# Patient Record
Sex: MALE | Employment: FULL TIME | URBAN - NONMETROPOLITAN AREA
[De-identification: names, ages, dates, MRNs, and addresses within clinical notes are randomized per-mention and may not be internally consistent; named-entity substitution may affect disease eponyms.]

---

## 2017-01-19 ENCOUNTER — TELEPHONE (OUTPATIENT)
Dept: FAMILY MEDICINE CLINIC | Age: 32
End: 2017-01-19

## 2017-01-19 DIAGNOSIS — Z79.4 TYPE 2 DIABETES MELLITUS WITH DIABETIC NEUROPATHY, WITH LONG-TERM CURRENT USE OF INSULIN (HCC): Primary | ICD-10-CM

## 2017-01-19 DIAGNOSIS — R63.4 WEIGHT LOSS: ICD-10-CM

## 2017-01-19 DIAGNOSIS — E11.40 TYPE 2 DIABETES MELLITUS WITH DIABETIC NEUROPATHY, WITH LONG-TERM CURRENT USE OF INSULIN (HCC): Primary | ICD-10-CM

## 2017-01-30 ENCOUNTER — OFFICE VISIT (OUTPATIENT)
Dept: FAMILY MEDICINE CLINIC | Age: 32
End: 2017-01-30

## 2017-01-30 VITALS
BODY MASS INDEX: 22.81 KG/M2 | HEIGHT: 69 IN | DIASTOLIC BLOOD PRESSURE: 60 MMHG | WEIGHT: 154 LBS | OXYGEN SATURATION: 98 % | SYSTOLIC BLOOD PRESSURE: 92 MMHG | HEART RATE: 110 BPM

## 2017-01-30 DIAGNOSIS — R63.4 UNINTENDED WEIGHT LOSS: Primary | ICD-10-CM

## 2017-01-30 DIAGNOSIS — E10.42 DIABETIC POLYNEUROPATHY ASSOCIATED WITH TYPE 1 DIABETES MELLITUS (HCC): ICD-10-CM

## 2017-01-30 DIAGNOSIS — G43.909 MIGRAINE WITHOUT STATUS MIGRAINOSUS, NOT INTRACTABLE, UNSPECIFIED MIGRAINE TYPE: ICD-10-CM

## 2017-01-30 DIAGNOSIS — E10.8 TYPE 1 DIABETES MELLITUS WITH COMPLICATIONS (HCC): ICD-10-CM

## 2017-01-30 PROCEDURE — 99214 OFFICE O/P EST MOD 30 MIN: CPT | Performed by: FAMILY MEDICINE

## 2017-01-30 RX ORDER — PEN NEEDLE, DIABETIC 30 GX5/16"
NEEDLE, DISPOSABLE MISCELLANEOUS
Qty: 100 EACH | Refills: 5 | Status: SHIPPED | OUTPATIENT
Start: 2017-01-30 | End: 2018-09-13 | Stop reason: SDUPTHER

## 2017-01-30 RX ORDER — SUMATRIPTAN 5 MG/1
1 SPRAY NASAL DAILY PRN
Qty: 1 EACH | Refills: 5 | Status: SHIPPED | OUTPATIENT
Start: 2017-01-30 | End: 2017-12-18 | Stop reason: SDUPTHER

## 2017-01-30 RX ORDER — LISINOPRIL 2.5 MG/1
2.5 TABLET ORAL DAILY
Qty: 30 TABLET | Refills: 11 | Status: SHIPPED | OUTPATIENT
Start: 2017-01-30 | End: 2017-05-02 | Stop reason: DRUGHIGH

## 2017-01-30 RX ORDER — PREGABALIN 75 MG/1
75 CAPSULE ORAL 2 TIMES DAILY
Qty: 60 CAPSULE | Refills: 5 | Status: SHIPPED | OUTPATIENT
Start: 2017-01-30 | End: 2017-05-02 | Stop reason: ALTCHOICE

## 2017-01-30 ASSESSMENT — ENCOUNTER SYMPTOMS
NAUSEA: 1
SHORTNESS OF BREATH: 0
ABDOMINAL DISTENTION: 1
COUGH: 0
VOMITING: 1
DIARRHEA: 0
WHEEZING: 0

## 2017-02-15 ENCOUNTER — TELEPHONE (OUTPATIENT)
Dept: FAMILY MEDICINE CLINIC | Age: 32
End: 2017-02-15

## 2017-02-15 DIAGNOSIS — E11.49 OTHER DIABETIC NEUROLOGICAL COMPLICATION ASSOCIATED WITH TYPE 2 DIABETES MELLITUS (HCC): Primary | ICD-10-CM

## 2017-02-15 DIAGNOSIS — Z79.4 ENCOUNTER FOR LONG-TERM (CURRENT) USE OF INSULIN (HCC): ICD-10-CM

## 2017-02-15 DIAGNOSIS — E78.2 MIXED HYPERLIPIDEMIA: ICD-10-CM

## 2017-02-15 DIAGNOSIS — E10.42 DIABETIC POLYNEUROPATHY ASSOCIATED WITH TYPE 1 DIABETES MELLITUS (HCC): ICD-10-CM

## 2017-02-16 PROBLEM — E78.2 MIXED HYPERLIPIDEMIA: Status: ACTIVE | Noted: 2017-02-16

## 2017-02-16 RX ORDER — SIMVASTATIN 10 MG
10 TABLET ORAL NIGHTLY
Qty: 30 TABLET | Refills: 5 | Status: SHIPPED | OUTPATIENT
Start: 2017-02-16 | End: 2018-12-18 | Stop reason: DRUGHIGH

## 2017-02-22 RX ORDER — GABAPENTIN 100 MG/1
100 CAPSULE ORAL 3 TIMES DAILY
Qty: 90 CAPSULE | Refills: 0 | Status: SHIPPED | OUTPATIENT
Start: 2017-02-22 | End: 2017-05-02 | Stop reason: DRUGHIGH

## 2017-04-06 ENCOUNTER — HOSPITAL ENCOUNTER (EMERGENCY)
Age: 32
Discharge: HOME | End: 2017-04-06
Attending: EMERGENCY MEDICINE
Payer: MEDICARE

## 2017-04-06 VITALS
SYSTOLIC BLOOD PRESSURE: 113 MMHG | DIASTOLIC BLOOD PRESSURE: 85 MMHG | RESPIRATION RATE: 18 BRPM | BODY MASS INDEX: 22.9 KG/M2 | OXYGEN SATURATION: 99 % | HEIGHT: 70 IN | HEART RATE: 96 BPM | TEMPERATURE: 99.1 F | WEIGHT: 160 LBS

## 2017-04-06 DIAGNOSIS — G43.009 MIGRAINE WITHOUT AURA AND WITHOUT STATUS MIGRAINOSUS, NOT INTRACTABLE: Primary | ICD-10-CM

## 2017-04-06 DIAGNOSIS — L03.811 CELLULITIS OF HEAD EXCEPT FACE: ICD-10-CM

## 2017-04-06 PROCEDURE — 6360000002 HC RX W HCPCS: Performed by: EMERGENCY MEDICINE

## 2017-04-06 PROCEDURE — 99283 EMERGENCY DEPT VISIT LOW MDM: CPT

## 2017-04-06 PROCEDURE — 96372 THER/PROPH/DIAG INJ SC/IM: CPT

## 2017-04-06 RX ORDER — KETOROLAC TROMETHAMINE 30 MG/ML
60 INJECTION, SOLUTION INTRAMUSCULAR; INTRAVENOUS ONCE
Status: COMPLETED | OUTPATIENT
Start: 2017-04-06 | End: 2017-04-06

## 2017-04-06 RX ORDER — DIPHENHYDRAMINE HYDROCHLORIDE 50 MG/ML
50 INJECTION INTRAMUSCULAR; INTRAVENOUS ONCE
Status: COMPLETED | OUTPATIENT
Start: 2017-04-06 | End: 2017-04-06

## 2017-04-06 RX ORDER — SULFAMETHOXAZOLE AND TRIMETHOPRIM 800; 160 MG/1; MG/1
1 TABLET ORAL 2 TIMES DAILY
Qty: 20 TABLET | Refills: 0 | Status: SHIPPED | OUTPATIENT
Start: 2017-04-06 | End: 2017-04-16

## 2017-04-06 RX ADMIN — KETOROLAC TROMETHAMINE 60 MG: 30 INJECTION, SOLUTION INTRAMUSCULAR at 11:25

## 2017-04-06 RX ADMIN — DIPHENHYDRAMINE HYDROCHLORIDE 50 MG: 50 INJECTION, SOLUTION INTRAMUSCULAR; INTRAVENOUS at 11:28

## 2017-04-06 ASSESSMENT — PAIN DESCRIPTION - PAIN TYPE: TYPE: ACUTE PAIN

## 2017-04-06 ASSESSMENT — PAIN SCALES - WONG BAKER: WONGBAKER_NUMERICALRESPONSE: 6

## 2017-04-06 ASSESSMENT — PAIN DESCRIPTION - FREQUENCY: FREQUENCY: CONTINUOUS

## 2017-04-06 ASSESSMENT — PAIN SCALES - GENERAL
PAINLEVEL_OUTOF10: 5
PAINLEVEL_OUTOF10: 7
PAINLEVEL_OUTOF10: 7

## 2017-04-06 ASSESSMENT — PAIN DESCRIPTION - ONSET: ONSET: GRADUAL

## 2017-04-06 ASSESSMENT — PAIN DESCRIPTION - DESCRIPTORS: DESCRIPTORS: THROBBING;SHARP

## 2017-04-06 ASSESSMENT — PAIN DESCRIPTION - LOCATION: LOCATION: HEAD

## 2017-04-06 ASSESSMENT — PAIN DESCRIPTION - ORIENTATION: ORIENTATION: LEFT

## 2017-04-28 ENCOUNTER — TELEPHONE (OUTPATIENT)
Dept: FAMILY MEDICINE CLINIC | Age: 32
End: 2017-04-28

## 2017-04-28 LAB — HBA1C MFR BLD: 12.2 %

## 2017-05-02 ENCOUNTER — TELEPHONE (OUTPATIENT)
Dept: FAMILY MEDICINE CLINIC | Age: 32
End: 2017-05-02

## 2017-05-02 RX ORDER — LISINOPRIL 5 MG/1
5 TABLET ORAL DAILY
COMMUNITY
End: 2018-12-18 | Stop reason: DRUGHIGH

## 2017-05-02 RX ORDER — GABAPENTIN 300 MG/1
1 CAPSULE ORAL DAILY
Refills: 0 | COMMUNITY
Start: 2017-05-01 | End: 2017-05-03 | Stop reason: SDUPTHER

## 2017-05-03 ENCOUNTER — OFFICE VISIT (OUTPATIENT)
Dept: FAMILY MEDICINE CLINIC | Age: 32
End: 2017-05-03
Payer: MEDICARE

## 2017-05-03 VITALS
OXYGEN SATURATION: 98 % | SYSTOLIC BLOOD PRESSURE: 104 MMHG | HEART RATE: 97 BPM | DIASTOLIC BLOOD PRESSURE: 62 MMHG | WEIGHT: 163 LBS | BODY MASS INDEX: 24.14 KG/M2 | HEIGHT: 69 IN

## 2017-05-03 DIAGNOSIS — E10.42 TYPE 1 DIABETES MELLITUS WITH DIABETIC POLYNEUROPATHY (HCC): Primary | ICD-10-CM

## 2017-05-03 DIAGNOSIS — Z13.31 SCREENING FOR DEPRESSION: ICD-10-CM

## 2017-05-03 DIAGNOSIS — N50.819 TESTICULAR PAIN, UNSPECIFIED: ICD-10-CM

## 2017-05-03 PROCEDURE — G0444 DEPRESSION SCREEN ANNUAL: HCPCS | Performed by: FAMILY MEDICINE

## 2017-05-03 PROCEDURE — 99495 TRANSJ CARE MGMT MOD F2F 14D: CPT | Performed by: FAMILY MEDICINE

## 2017-05-03 RX ORDER — GABAPENTIN 300 MG/1
300 CAPSULE ORAL 3 TIMES DAILY
Qty: 90 CAPSULE | Refills: 0 | Status: SHIPPED | OUTPATIENT
Start: 2017-05-03 | End: 2017-06-13 | Stop reason: SDUPTHER

## 2017-05-03 RX ORDER — GLUCOSAMINE HCL/CHONDROITIN SU 500-400 MG
CAPSULE ORAL
Qty: 100 STRIP | Refills: 11 | Status: SHIPPED | OUTPATIENT
Start: 2017-05-03 | End: 2017-12-18 | Stop reason: SDUPTHER

## 2017-05-03 ASSESSMENT — PATIENT HEALTH QUESTIONNAIRE - PHQ9
1. LITTLE INTEREST OR PLEASURE IN DOING THINGS: 0
2. FEELING DOWN, DEPRESSED OR HOPELESS: 0
SUM OF ALL RESPONSES TO PHQ QUESTIONS 1-9: 0
SUM OF ALL RESPONSES TO PHQ9 QUESTIONS 1 & 2: 0

## 2017-05-03 ASSESSMENT — ENCOUNTER SYMPTOMS
NAUSEA: 0
VOMITING: 0

## 2017-05-04 RX ORDER — GLUCOSAMINE HCL/CHONDROITIN SU 500-400 MG
CAPSULE ORAL
Qty: 100 STRIP | Refills: 11 | Status: SHIPPED | OUTPATIENT
Start: 2017-05-04 | End: 2018-09-13 | Stop reason: SDUPTHER

## 2017-05-04 RX ORDER — LANCETS 30 GAUGE
EACH MISCELLANEOUS
Qty: 100 EACH | Refills: 11 | Status: SHIPPED | OUTPATIENT
Start: 2017-05-04 | End: 2020-04-02 | Stop reason: SDUPTHER

## 2017-05-11 ENCOUNTER — TELEPHONE (OUTPATIENT)
Dept: FAMILY MEDICINE CLINIC | Age: 32
End: 2017-05-11

## 2017-05-11 RX ORDER — GABAPENTIN 100 MG/1
100 CAPSULE ORAL 3 TIMES DAILY
Qty: 65 CAPSULE | Refills: 0 | Status: SHIPPED | OUTPATIENT
Start: 2017-05-11 | End: 2017-06-13 | Stop reason: SDUPTHER

## 2017-05-18 ENCOUNTER — TELEPHONE (OUTPATIENT)
Dept: FAMILY MEDICINE CLINIC | Age: 32
End: 2017-05-18

## 2017-06-13 DIAGNOSIS — E10.42 TYPE 1 DIABETES MELLITUS WITH DIABETIC POLYNEUROPATHY (HCC): ICD-10-CM

## 2017-06-13 RX ORDER — GABAPENTIN 100 MG/1
100 CAPSULE ORAL 3 TIMES DAILY
Qty: 90 CAPSULE | Refills: 2 | Status: SHIPPED | OUTPATIENT
Start: 2017-06-13 | End: 2017-09-25 | Stop reason: SDUPTHER

## 2017-06-13 RX ORDER — GABAPENTIN 300 MG/1
300 CAPSULE ORAL 3 TIMES DAILY
Qty: 90 CAPSULE | Refills: 2 | Status: SHIPPED | OUTPATIENT
Start: 2017-06-13 | End: 2017-09-25 | Stop reason: SDUPTHER

## 2017-07-12 ENCOUNTER — OFFICE VISIT (OUTPATIENT)
Dept: FAMILY MEDICINE CLINIC | Age: 32
End: 2017-07-12
Payer: MEDICARE

## 2017-07-12 VITALS
HEIGHT: 69 IN | BODY MASS INDEX: 24.27 KG/M2 | HEART RATE: 96 BPM | SYSTOLIC BLOOD PRESSURE: 126 MMHG | RESPIRATION RATE: 14 BRPM | DIASTOLIC BLOOD PRESSURE: 84 MMHG | WEIGHT: 163.9 LBS

## 2017-07-12 DIAGNOSIS — E10.40 TYPE 1 DIABETES MELLITUS WITH DIABETIC NEUROPATHY (HCC): Primary | ICD-10-CM

## 2017-07-12 DIAGNOSIS — E78.2 MIXED HYPERLIPIDEMIA: ICD-10-CM

## 2017-07-12 DIAGNOSIS — N50.819 TESTICULAR PAIN: ICD-10-CM

## 2017-07-12 DIAGNOSIS — Z91.030 ALLERGY TO BEE STING: ICD-10-CM

## 2017-07-12 DIAGNOSIS — R06.02 SHORTNESS OF BREATH: ICD-10-CM

## 2017-07-12 PROCEDURE — 4004F PT TOBACCO SCREEN RCVD TLK: CPT | Performed by: FAMILY MEDICINE

## 2017-07-12 PROCEDURE — 3046F HEMOGLOBIN A1C LEVEL >9.0%: CPT | Performed by: FAMILY MEDICINE

## 2017-07-12 PROCEDURE — G8420 CALC BMI NORM PARAMETERS: HCPCS | Performed by: FAMILY MEDICINE

## 2017-07-12 PROCEDURE — G8427 DOCREV CUR MEDS BY ELIG CLIN: HCPCS | Performed by: FAMILY MEDICINE

## 2017-07-12 PROCEDURE — 99214 OFFICE O/P EST MOD 30 MIN: CPT | Performed by: FAMILY MEDICINE

## 2017-07-12 RX ORDER — ALBUTEROL SULFATE 90 UG/1
1-2 AEROSOL, METERED RESPIRATORY (INHALATION) EVERY 6 HOURS PRN
Qty: 1 INHALER | Refills: 0 | Status: SHIPPED | OUTPATIENT
Start: 2017-07-12 | End: 2017-07-26 | Stop reason: ALTCHOICE

## 2017-07-12 RX ORDER — DULOXETIN HYDROCHLORIDE 30 MG/1
30 CAPSULE, DELAYED RELEASE ORAL DAILY
Qty: 30 CAPSULE | Refills: 0 | Status: SHIPPED | OUTPATIENT
Start: 2017-07-12 | End: 2017-12-18 | Stop reason: SDUPTHER

## 2017-07-12 RX ORDER — EPINEPHRINE 0.3 MG/.3ML
INJECTION SUBCUTANEOUS
Qty: 1 EACH | Refills: 0 | Status: SHIPPED | OUTPATIENT
Start: 2017-07-12 | End: 2020-04-02 | Stop reason: SDUPTHER

## 2017-07-12 ASSESSMENT — ENCOUNTER SYMPTOMS
SHORTNESS OF BREATH: 1
CHEST TIGHTNESS: 1

## 2017-07-18 ENCOUNTER — HOSPITAL ENCOUNTER (INPATIENT)
Age: 32
LOS: 2 days | Discharge: HOME | DRG: 639 | End: 2017-07-20
Attending: FAMILY MEDICINE | Admitting: FAMILY MEDICINE
Payer: MEDICARE

## 2017-07-18 DIAGNOSIS — E10.42 TYPE 1 DIABETES MELLITUS WITH DIABETIC POLYNEUROPATHY (HCC): ICD-10-CM

## 2017-07-18 DIAGNOSIS — E10.40 TYPE 1 DIABETES MELLITUS WITH DIABETIC NEUROPATHY (HCC): ICD-10-CM

## 2017-07-18 PROBLEM — E11.10 DKA (DIABETIC KETOACIDOSES): Status: ACTIVE | Noted: 2017-07-18

## 2017-07-18 LAB — GLUCOSE BLD-MCNC: 169 MG/DL (ref 75–110)

## 2017-07-18 PROCEDURE — 81001 URINALYSIS AUTO W/SCOPE: CPT

## 2017-07-18 PROCEDURE — 2060000000 HC ICU INTERMEDIATE R&B

## 2017-07-18 PROCEDURE — 2580000003 HC RX 258: Performed by: NURSE PRACTITIONER

## 2017-07-18 PROCEDURE — 6370000000 HC RX 637 (ALT 250 FOR IP): Performed by: NURSE PRACTITIONER

## 2017-07-18 PROCEDURE — 82947 ASSAY GLUCOSE BLOOD QUANT: CPT

## 2017-07-18 RX ORDER — SIMVASTATIN 10 MG
10 TABLET ORAL NIGHTLY
Status: DISCONTINUED | OUTPATIENT
Start: 2017-07-18 | End: 2017-07-20 | Stop reason: HOSPADM

## 2017-07-18 RX ORDER — SODIUM CHLORIDE 0.9 % (FLUSH) 0.9 %
10 SYRINGE (ML) INJECTION PRN
Status: DISCONTINUED | OUTPATIENT
Start: 2017-07-18 | End: 2017-07-20 | Stop reason: HOSPADM

## 2017-07-18 RX ORDER — SODIUM CHLORIDE 9 MG/ML
INJECTION, SOLUTION INTRAVENOUS CONTINUOUS
Status: DISCONTINUED | OUTPATIENT
Start: 2017-07-18 | End: 2017-07-20 | Stop reason: HOSPADM

## 2017-07-18 RX ORDER — DULOXETIN HYDROCHLORIDE 30 MG/1
30 CAPSULE, DELAYED RELEASE ORAL DAILY
Status: DISCONTINUED | OUTPATIENT
Start: 2017-07-19 | End: 2017-07-20 | Stop reason: HOSPADM

## 2017-07-18 RX ORDER — POTASSIUM CHLORIDE 7.45 MG/ML
10 INJECTION INTRAVENOUS PRN
Status: DISCONTINUED | OUTPATIENT
Start: 2017-07-18 | End: 2017-07-20

## 2017-07-18 RX ORDER — HYDROCODONE BITARTRATE AND ACETAMINOPHEN 5; 325 MG/1; MG/1
2 TABLET ORAL EVERY 4 HOURS PRN
Status: DISCONTINUED | OUTPATIENT
Start: 2017-07-18 | End: 2017-07-20 | Stop reason: HOSPADM

## 2017-07-18 RX ORDER — GABAPENTIN 100 MG/1
100 CAPSULE ORAL 3 TIMES DAILY
Status: DISCONTINUED | OUTPATIENT
Start: 2017-07-19 | End: 2017-07-18

## 2017-07-18 RX ORDER — ALBUTEROL SULFATE 90 UG/1
2 AEROSOL, METERED RESPIRATORY (INHALATION) EVERY 4 HOURS PRN
Status: DISCONTINUED | OUTPATIENT
Start: 2017-07-18 | End: 2017-07-20 | Stop reason: HOSPADM

## 2017-07-18 RX ORDER — GABAPENTIN 100 MG/1
100 CAPSULE ORAL 3 TIMES DAILY
Status: DISCONTINUED | OUTPATIENT
Start: 2017-07-18 | End: 2017-07-18

## 2017-07-18 RX ORDER — DEXTROSE AND SODIUM CHLORIDE 5; .45 G/100ML; G/100ML
INJECTION, SOLUTION INTRAVENOUS CONTINUOUS PRN
Status: DISCONTINUED | OUTPATIENT
Start: 2017-07-18 | End: 2017-07-20 | Stop reason: HOSPADM

## 2017-07-18 RX ORDER — SODIUM CHLORIDE 0.9 % (FLUSH) 0.9 %
10 SYRINGE (ML) INJECTION EVERY 12 HOURS SCHEDULED
Status: DISCONTINUED | OUTPATIENT
Start: 2017-07-18 | End: 2017-07-20 | Stop reason: HOSPADM

## 2017-07-18 RX ORDER — MAGNESIUM SULFATE 1 G/100ML
1 INJECTION INTRAVENOUS PRN
Status: DISCONTINUED | OUTPATIENT
Start: 2017-07-18 | End: 2017-07-20 | Stop reason: HOSPADM

## 2017-07-18 RX ORDER — ONDANSETRON 2 MG/ML
4 INJECTION INTRAMUSCULAR; INTRAVENOUS EVERY 6 HOURS PRN
Status: DISCONTINUED | OUTPATIENT
Start: 2017-07-18 | End: 2017-07-20 | Stop reason: HOSPADM

## 2017-07-18 RX ORDER — ACETAMINOPHEN 325 MG/1
650 TABLET ORAL EVERY 4 HOURS PRN
Status: DISCONTINUED | OUTPATIENT
Start: 2017-07-18 | End: 2017-07-20 | Stop reason: HOSPADM

## 2017-07-18 RX ORDER — GABAPENTIN 300 MG/1
300 CAPSULE ORAL 3 TIMES DAILY
Status: DISCONTINUED | OUTPATIENT
Start: 2017-07-18 | End: 2017-07-18

## 2017-07-18 RX ORDER — DEXTROSE MONOHYDRATE 25 G/50ML
12.5 INJECTION, SOLUTION INTRAVENOUS PRN
Status: DISCONTINUED | OUTPATIENT
Start: 2017-07-18 | End: 2017-07-20 | Stop reason: HOSPADM

## 2017-07-18 RX ORDER — LISINOPRIL 5 MG/1
5 TABLET ORAL DAILY
Status: DISCONTINUED | OUTPATIENT
Start: 2017-07-19 | End: 2017-07-20 | Stop reason: HOSPADM

## 2017-07-18 RX ORDER — HYDROCODONE BITARTRATE AND ACETAMINOPHEN 5; 325 MG/1; MG/1
1 TABLET ORAL EVERY 4 HOURS PRN
Status: DISCONTINUED | OUTPATIENT
Start: 2017-07-18 | End: 2017-07-20 | Stop reason: HOSPADM

## 2017-07-18 RX ADMIN — GABAPENTIN 400 MG: 300 CAPSULE ORAL at 23:50

## 2017-07-18 RX ADMIN — Medication 3.27 UNITS/HR: at 23:29

## 2017-07-18 RX ADMIN — HYDROCODONE BITARTRATE AND ACETAMINOPHEN 2 TABLET: 5; 325 TABLET ORAL at 23:50

## 2017-07-18 RX ADMIN — DEXTROSE AND SODIUM CHLORIDE: 5; 450 INJECTION, SOLUTION INTRAVENOUS at 23:28

## 2017-07-18 ASSESSMENT — PAIN DESCRIPTION - PROGRESSION: CLINICAL_PROGRESSION: GRADUALLY WORSENING

## 2017-07-18 ASSESSMENT — PAIN DESCRIPTION - ONSET: ONSET: ON-GOING

## 2017-07-18 ASSESSMENT — PAIN DESCRIPTION - DESCRIPTORS: DESCRIPTORS: THROBBING

## 2017-07-18 ASSESSMENT — PAIN DESCRIPTION - ORIENTATION: ORIENTATION: OTHER (COMMENT)

## 2017-07-18 ASSESSMENT — PAIN DESCRIPTION - LOCATION: LOCATION: BACK

## 2017-07-18 ASSESSMENT — PAIN SCALES - GENERAL
PAINLEVEL_OUTOF10: 8
PAINLEVEL_OUTOF10: 6

## 2017-07-18 ASSESSMENT — PAIN DESCRIPTION - FREQUENCY: FREQUENCY: CONTINUOUS

## 2017-07-18 ASSESSMENT — PAIN DESCRIPTION - PAIN TYPE: TYPE: ACUTE PAIN

## 2017-07-19 ENCOUNTER — APPOINTMENT (OUTPATIENT)
Dept: GENERAL RADIOLOGY | Age: 32
DRG: 639 | End: 2017-07-19
Attending: FAMILY MEDICINE
Payer: MEDICARE

## 2017-07-19 PROBLEM — E08.10 DIABETIC KETOACIDOSIS WITHOUT COMA ASSOCIATED WITH DIABETES MELLITUS DUE TO UNDERLYING CONDITION (HCC): Status: ACTIVE | Noted: 2017-07-18

## 2017-07-19 LAB
-: NORMAL
ABSOLUTE EOS #: 0 K/UL (ref 0–0.4)
ABSOLUTE LYMPH #: 1.4 K/UL (ref 1–4.8)
ABSOLUTE MONO #: 0.3 K/UL (ref 0.1–1.2)
AMORPHOUS: NORMAL
ANION GAP SERPL CALCULATED.3IONS-SCNC: 11 MMOL/L (ref 9–17)
ANION GAP SERPL CALCULATED.3IONS-SCNC: 12 MMOL/L (ref 9–17)
ANION GAP SERPL CALCULATED.3IONS-SCNC: 13 MMOL/L (ref 9–17)
ANION GAP SERPL CALCULATED.3IONS-SCNC: 17 MMOL/L (ref 9–17)
BACTERIA: NORMAL
BASOPHILS # BLD: 1 %
BASOPHILS ABSOLUTE: 0 K/UL (ref 0–0.2)
BILIRUBIN URINE: ABNORMAL
BUN BLDV-MCNC: 10 MG/DL (ref 6–20)
BUN BLDV-MCNC: 12 MG/DL (ref 6–20)
BUN BLDV-MCNC: 14 MG/DL (ref 6–20)
BUN BLDV-MCNC: 15 MG/DL (ref 6–20)
BUN BLDV-MCNC: 9 MG/DL (ref 6–20)
BUN BLDV-MCNC: 9 MG/DL (ref 6–20)
BUN/CREAT BLD: 16 (ref 9–20)
BUN/CREAT BLD: 17 (ref 9–20)
BUN/CREAT BLD: 19 (ref 9–20)
BUN/CREAT BLD: 21 (ref 9–20)
BUN/CREAT BLD: 24 (ref 9–20)
BUN/CREAT BLD: 28 (ref 9–20)
CALCIUM SERPL-MCNC: 8.7 MG/DL (ref 8.6–10.4)
CALCIUM SERPL-MCNC: 8.8 MG/DL (ref 8.6–10.4)
CALCIUM SERPL-MCNC: 9.1 MG/DL (ref 8.6–10.4)
CALCIUM SERPL-MCNC: 9.2 MG/DL (ref 8.6–10.4)
CASTS UA: NORMAL /LPF (ref 0–2)
CHLORIDE BLD-SCNC: 100 MMOL/L (ref 98–107)
CHLORIDE BLD-SCNC: 98 MMOL/L (ref 98–107)
CHLORIDE BLD-SCNC: 99 MMOL/L (ref 98–107)
CO2: 21 MMOL/L (ref 20–31)
CO2: 23 MMOL/L (ref 20–31)
CO2: 24 MMOL/L (ref 20–31)
CO2: 24 MMOL/L (ref 20–31)
CO2: 25 MMOL/L (ref 20–31)
CO2: 26 MMOL/L (ref 20–31)
COLOR: ABNORMAL
COMMENT UA: ABNORMAL
CREAT SERPL-MCNC: 0.5 MG/DL (ref 0.7–1.2)
CREAT SERPL-MCNC: 0.53 MG/DL (ref 0.7–1.2)
CREAT SERPL-MCNC: 0.54 MG/DL (ref 0.7–1.2)
CREAT SERPL-MCNC: 0.56 MG/DL (ref 0.7–1.2)
CREAT SERPL-MCNC: 0.58 MG/DL (ref 0.7–1.2)
CREAT SERPL-MCNC: 0.63 MG/DL (ref 0.7–1.2)
CRYSTALS, UA: NORMAL /HPF
DIFFERENTIAL TYPE: ABNORMAL
EOSINOPHILS RELATIVE PERCENT: 0 %
EPITHELIAL CELLS UA: NORMAL /HPF (ref 0–5)
FIO2: ABNORMAL
GFR AFRICAN AMERICAN: >60 ML/MIN
GFR NON-AFRICAN AMERICAN: >60 ML/MIN
GFR SERPL CREATININE-BSD FRML MDRD: ABNORMAL ML/MIN/{1.73_M2}
GLUCOSE BLD-MCNC: 126 MG/DL (ref 75–110)
GLUCOSE BLD-MCNC: 132 MG/DL (ref 75–110)
GLUCOSE BLD-MCNC: 141 MG/DL (ref 75–110)
GLUCOSE BLD-MCNC: 141 MG/DL (ref 75–110)
GLUCOSE BLD-MCNC: 151 MG/DL (ref 70–99)
GLUCOSE BLD-MCNC: 152 MG/DL (ref 75–110)
GLUCOSE BLD-MCNC: 155 MG/DL (ref 75–110)
GLUCOSE BLD-MCNC: 159 MG/DL (ref 70–99)
GLUCOSE BLD-MCNC: 161 MG/DL (ref 75–110)
GLUCOSE BLD-MCNC: 164 MG/DL (ref 75–110)
GLUCOSE BLD-MCNC: 165 MG/DL (ref 75–110)
GLUCOSE BLD-MCNC: 172 MG/DL (ref 70–99)
GLUCOSE BLD-MCNC: 174 MG/DL (ref 70–99)
GLUCOSE BLD-MCNC: 174 MG/DL (ref 75–110)
GLUCOSE BLD-MCNC: 174 MG/DL (ref 75–110)
GLUCOSE BLD-MCNC: 177 MG/DL (ref 75–110)
GLUCOSE BLD-MCNC: 178 MG/DL (ref 70–99)
GLUCOSE BLD-MCNC: 180 MG/DL (ref 70–99)
GLUCOSE BLD-MCNC: 185 MG/DL (ref 75–110)
GLUCOSE BLD-MCNC: 189 MG/DL (ref 75–110)
GLUCOSE BLD-MCNC: 191 MG/DL (ref 75–110)
GLUCOSE BLD-MCNC: 200 MG/DL (ref 75–110)
GLUCOSE BLD-MCNC: 222 MG/DL (ref 75–110)
GLUCOSE BLD-MCNC: 250 MG/DL (ref 75–110)
GLUCOSE BLD-MCNC: 256 MG/DL (ref 75–110)
GLUCOSE BLD-MCNC: 268 MG/DL (ref 75–110)
GLUCOSE BLD-MCNC: 287 MG/DL (ref 75–110)
GLUCOSE BLD-MCNC: 90 MG/DL (ref 75–110)
GLUCOSE URINE: ABNORMAL
HCT VFR BLD CALC: 41.8 % (ref 41–53)
HEMOGLOBIN: 14.7 G/DL (ref 13.5–17.5)
KETONES, URINE: ABNORMAL
LEUKOCYTE ESTERASE, URINE: NEGATIVE
LYMPHOCYTES # BLD: 46 %
MAGNESIUM: 1.6 MG/DL (ref 1.6–2.6)
MAGNESIUM: 1.7 MG/DL (ref 1.6–2.6)
MAGNESIUM: 1.7 MG/DL (ref 1.6–2.6)
MAGNESIUM: 1.8 MG/DL (ref 1.6–2.6)
MAGNESIUM: 1.9 MG/DL (ref 1.6–2.6)
MAGNESIUM: 2.1 MG/DL (ref 1.6–2.6)
MCH RBC QN AUTO: 33.5 PG (ref 26–34)
MCHC RBC AUTO-ENTMCNC: 35.1 G/DL (ref 31–37)
MCV RBC AUTO: 95.4 FL (ref 80–100)
MONOCYTES # BLD: 11 %
MUCUS: NORMAL
NEGATIVE BASE EXCESS, ART: 9 (ref 0–2)
NITRITE, URINE: NEGATIVE
O2 DEVICE/FLOW/%: ABNORMAL
OTHER OBSERVATIONS UA: NORMAL
PATIENT TEMP: 37
PDW BLD-RTO: 12 % (ref 11–14.5)
PH UA: 5.5 (ref 5–6)
PHOSPHORUS: 3.2 MG/DL (ref 2.5–4.5)
PHOSPHORUS: 4.2 MG/DL (ref 2.5–4.5)
PLATELET # BLD: 133 K/UL (ref 140–450)
PLATELET ESTIMATE: ABNORMAL
PMV BLD AUTO: 8.1 FL (ref 6–12)
POC HCO3: 17.7 MMOL/L (ref 22–27)
POC O2 SATURATION: 97 %
POC PCO2 TEMP: ABNORMAL MM HG
POC PCO2: 38 MM HG (ref 32–45)
POC PH TEMP: ABNORMAL
POC PH: 7.28 (ref 7.35–7.45)
POC PO2 TEMP: ABNORMAL MM HG
POC PO2: 102 MM HG (ref 75–95)
POSITIVE BASE EXCESS, ART: ABNORMAL (ref 0–2)
POTASSIUM SERPL-SCNC: 3.4 MMOL/L (ref 3.7–5.3)
POTASSIUM SERPL-SCNC: 3.6 MMOL/L (ref 3.7–5.3)
POTASSIUM SERPL-SCNC: 3.6 MMOL/L (ref 3.7–5.3)
POTASSIUM SERPL-SCNC: 3.8 MMOL/L (ref 3.7–5.3)
POTASSIUM SERPL-SCNC: 3.8 MMOL/L (ref 3.7–5.3)
POTASSIUM SERPL-SCNC: 4.1 MMOL/L (ref 3.7–5.3)
PROTEIN UA: NEGATIVE
RBC # BLD: 4.38 M/UL (ref 4.5–5.9)
RBC # BLD: ABNORMAL 10*6/UL
RBC UA: NORMAL /HPF (ref 0–4)
RENAL EPITHELIAL, UA: NORMAL /HPF
SEG NEUTROPHILS: 42 %
SEGMENTED NEUTROPHILS ABSOLUTE COUNT: 1.3 K/UL (ref 1.8–7.7)
SODIUM BLD-SCNC: 136 MMOL/L (ref 135–144)
SODIUM BLD-SCNC: 137 MMOL/L (ref 135–144)
SPECIFIC GRAVITY UA: 1.02 (ref 1.01–1.02)
TCO2 (CALC), ART: 19 MMOL/L (ref 23–28)
TRICHOMONAS: NORMAL
TURBIDITY: ABNORMAL
URINE HGB: NEGATIVE
UROBILINOGEN, URINE: NORMAL
WBC # BLD: 3 K/UL (ref 3.5–11)
WBC # BLD: ABNORMAL 10*3/UL
WBC UA: NORMAL /HPF (ref 0–4)
YEAST: NORMAL

## 2017-07-19 PROCEDURE — 6370000000 HC RX 637 (ALT 250 FOR IP)

## 2017-07-19 PROCEDURE — 36415 COLL VENOUS BLD VENIPUNCTURE: CPT

## 2017-07-19 PROCEDURE — 6370000000 HC RX 637 (ALT 250 FOR IP): Performed by: INTERNAL MEDICINE

## 2017-07-19 PROCEDURE — 6360000002 HC RX W HCPCS: Performed by: NURSE PRACTITIONER

## 2017-07-19 PROCEDURE — 85025 COMPLETE CBC W/AUTO DIFF WBC: CPT

## 2017-07-19 PROCEDURE — 84100 ASSAY OF PHOSPHORUS: CPT

## 2017-07-19 PROCEDURE — 2580000003 HC RX 258: Performed by: NURSE PRACTITIONER

## 2017-07-19 PROCEDURE — 82947 ASSAY GLUCOSE BLOOD QUANT: CPT

## 2017-07-19 PROCEDURE — 80048 BASIC METABOLIC PNL TOTAL CA: CPT

## 2017-07-19 PROCEDURE — 83735 ASSAY OF MAGNESIUM: CPT

## 2017-07-19 PROCEDURE — 2060000000 HC ICU INTERMEDIATE R&B

## 2017-07-19 PROCEDURE — 6370000000 HC RX 637 (ALT 250 FOR IP): Performed by: NURSE PRACTITIONER

## 2017-07-19 PROCEDURE — 99232 SBSQ HOSP IP/OBS MODERATE 35: CPT | Performed by: INTERNAL MEDICINE

## 2017-07-19 PROCEDURE — 82803 BLOOD GASES ANY COMBINATION: CPT

## 2017-07-19 RX ORDER — POTASSIUM CHLORIDE 20 MEQ/1
30 TABLET, EXTENDED RELEASE ORAL ONCE
Status: COMPLETED | OUTPATIENT
Start: 2017-07-19 | End: 2017-07-19

## 2017-07-19 RX ORDER — DEXTROSE MONOHYDRATE 25 G/50ML
12.5 INJECTION, SOLUTION INTRAVENOUS PRN
Status: DISCONTINUED | OUTPATIENT
Start: 2017-07-19 | End: 2017-07-20 | Stop reason: HOSPADM

## 2017-07-19 RX ORDER — POTASSIUM CHLORIDE 750 MG/1
TABLET, EXTENDED RELEASE ORAL
Status: DISCONTINUED
Start: 2017-07-19 | End: 2017-07-19 | Stop reason: WASHOUT

## 2017-07-19 RX ORDER — SIMVASTATIN 10 MG
TABLET ORAL
Status: COMPLETED
Start: 2017-07-19 | End: 2017-07-19

## 2017-07-19 RX ORDER — DEXTROSE MONOHYDRATE 50 MG/ML
100 INJECTION, SOLUTION INTRAVENOUS PRN
Status: DISCONTINUED | OUTPATIENT
Start: 2017-07-19 | End: 2017-07-20 | Stop reason: HOSPADM

## 2017-07-19 RX ORDER — INSULIN GLARGINE 100 [IU]/ML
30 INJECTION, SOLUTION SUBCUTANEOUS DAILY
Status: DISCONTINUED | OUTPATIENT
Start: 2017-07-19 | End: 2017-07-20 | Stop reason: HOSPADM

## 2017-07-19 RX ORDER — NICOTINE POLACRILEX 4 MG
15 LOZENGE BUCCAL PRN
Status: DISCONTINUED | OUTPATIENT
Start: 2017-07-19 | End: 2017-07-20 | Stop reason: HOSPADM

## 2017-07-19 RX ADMIN — INSULIN LISPRO 25 UNITS: 100 INJECTION, SOLUTION INTRAVENOUS; SUBCUTANEOUS at 12:25

## 2017-07-19 RX ADMIN — DEXTROSE AND SODIUM CHLORIDE: 5; 450 INJECTION, SOLUTION INTRAVENOUS at 19:17

## 2017-07-19 RX ADMIN — GABAPENTIN 400 MG: 300 CAPSULE ORAL at 09:39

## 2017-07-19 RX ADMIN — INSULIN GLARGINE 30 UNITS: 100 INJECTION, SOLUTION SUBCUTANEOUS at 12:25

## 2017-07-19 RX ADMIN — SIMVASTATIN 10 MG: 10 TABLET, FILM COATED ORAL at 00:08

## 2017-07-19 RX ADMIN — DEXTROSE AND SODIUM CHLORIDE: 5; 450 INJECTION, SOLUTION INTRAVENOUS at 05:12

## 2017-07-19 RX ADMIN — GABAPENTIN 400 MG: 300 CAPSULE ORAL at 16:13

## 2017-07-19 RX ADMIN — MAGNESIUM SULFATE HEPTAHYDRATE 1 G: 1 INJECTION, SOLUTION INTRAVENOUS at 05:09

## 2017-07-19 RX ADMIN — HYDROMORPHONE HYDROCHLORIDE 0.5 MG: 1 INJECTION, SOLUTION INTRAMUSCULAR; INTRAVENOUS; SUBCUTANEOUS at 19:32

## 2017-07-19 RX ADMIN — HYDROCODONE BITARTRATE AND ACETAMINOPHEN 2 TABLET: 5; 325 TABLET ORAL at 11:09

## 2017-07-19 RX ADMIN — ENOXAPARIN SODIUM 40 MG: 40 INJECTION SUBCUTANEOUS at 09:38

## 2017-07-19 RX ADMIN — POTASSIUM CHLORIDE 30 MEQ: 20 TABLET, EXTENDED RELEASE ORAL at 05:08

## 2017-07-19 RX ADMIN — HYDROCODONE BITARTRATE AND ACETAMINOPHEN 2 TABLET: 5; 325 TABLET ORAL at 17:43

## 2017-07-19 RX ADMIN — LISINOPRIL 5 MG: 5 TABLET ORAL at 09:39

## 2017-07-19 RX ADMIN — DEXTROSE AND SODIUM CHLORIDE: 5; 450 INJECTION, SOLUTION INTRAVENOUS at 12:28

## 2017-07-19 RX ADMIN — POTASSIUM CHLORIDE 30 MEQ: 20 TABLET, EXTENDED RELEASE ORAL at 02:09

## 2017-07-19 RX ADMIN — DULOXETINE 30 MG: 30 CAPSULE, DELAYED RELEASE ORAL at 09:39

## 2017-07-19 RX ADMIN — INSULIN LISPRO 25 UNITS: 100 INJECTION, SOLUTION INTRAVENOUS; SUBCUTANEOUS at 17:24

## 2017-07-19 RX ADMIN — SIMVASTATIN 10 MG: 10 TABLET, FILM COATED ORAL at 21:07

## 2017-07-19 RX ADMIN — MAGNESIUM SULFATE HEPTAHYDRATE 1 G: 1 INJECTION, SOLUTION INTRAVENOUS at 06:06

## 2017-07-19 RX ADMIN — GABAPENTIN 400 MG: 300 CAPSULE ORAL at 21:07

## 2017-07-19 RX ADMIN — Medication 0.03 UNITS/KG/HR: at 17:37

## 2017-07-19 RX ADMIN — HYDROCODONE BITARTRATE AND ACETAMINOPHEN 2 TABLET: 5; 325 TABLET ORAL at 05:12

## 2017-07-19 ASSESSMENT — PAIN DESCRIPTION - DESCRIPTORS
DESCRIPTORS: ACHING

## 2017-07-19 ASSESSMENT — PAIN SCALES - GENERAL
PAINLEVEL_OUTOF10: 7
PAINLEVEL_OUTOF10: 3
PAINLEVEL_OUTOF10: 8
PAINLEVEL_OUTOF10: 3
PAINLEVEL_OUTOF10: 0
PAINLEVEL_OUTOF10: 0
PAINLEVEL_OUTOF10: 8
PAINLEVEL_OUTOF10: 2
PAINLEVEL_OUTOF10: 0
PAINLEVEL_OUTOF10: 8
PAINLEVEL_OUTOF10: 0
PAINLEVEL_OUTOF10: 0

## 2017-07-19 ASSESSMENT — PAIN DESCRIPTION - PROGRESSION
CLINICAL_PROGRESSION: GRADUALLY WORSENING
CLINICAL_PROGRESSION: GRADUALLY WORSENING
CLINICAL_PROGRESSION: RAPIDLY WORSENING
CLINICAL_PROGRESSION: GRADUALLY WORSENING
CLINICAL_PROGRESSION: GRADUALLY IMPROVING
CLINICAL_PROGRESSION: GRADUALLY WORSENING

## 2017-07-19 ASSESSMENT — PAIN DESCRIPTION - PAIN TYPE
TYPE: ACUTE PAIN
TYPE: CHRONIC PAIN
TYPE: CHRONIC PAIN

## 2017-07-19 ASSESSMENT — PAIN DESCRIPTION - ORIENTATION
ORIENTATION: LOWER
ORIENTATION: LOWER

## 2017-07-19 ASSESSMENT — PAIN DESCRIPTION - LOCATION
LOCATION: HEAD
LOCATION: HEAD
LOCATION: BACK
LOCATION: HEAD
LOCATION: BACK

## 2017-07-19 ASSESSMENT — PAIN DESCRIPTION - FREQUENCY
FREQUENCY: CONTINUOUS
FREQUENCY: CONTINUOUS
FREQUENCY: INTERMITTENT
FREQUENCY: INTERMITTENT

## 2017-07-19 ASSESSMENT — PAIN DESCRIPTION - ONSET
ONSET: GRADUAL
ONSET: ON-GOING
ONSET: PROGRESSIVE
ONSET: GRADUAL

## 2017-07-20 VITALS
HEART RATE: 104 BPM | HEIGHT: 68 IN | RESPIRATION RATE: 16 BRPM | WEIGHT: 161.2 LBS | OXYGEN SATURATION: 99 % | BODY MASS INDEX: 24.43 KG/M2 | TEMPERATURE: 98.1 F | SYSTOLIC BLOOD PRESSURE: 115 MMHG | DIASTOLIC BLOOD PRESSURE: 71 MMHG

## 2017-07-20 LAB
ABSOLUTE EOS #: 0 K/UL (ref 0–0.4)
ABSOLUTE LYMPH #: 1.3 K/UL (ref 1–4.8)
ABSOLUTE MONO #: 0.3 K/UL (ref 0.1–1.2)
ANION GAP SERPL CALCULATED.3IONS-SCNC: 11 MMOL/L (ref 9–17)
ANION GAP SERPL CALCULATED.3IONS-SCNC: 12 MMOL/L (ref 9–17)
ANION GAP SERPL CALCULATED.3IONS-SCNC: 13 MMOL/L (ref 9–17)
BASOPHILS # BLD: 0 %
BASOPHILS ABSOLUTE: 0 K/UL (ref 0–0.2)
BUN BLDV-MCNC: 5 MG/DL (ref 6–20)
BUN BLDV-MCNC: 6 MG/DL (ref 6–20)
BUN BLDV-MCNC: 8 MG/DL (ref 6–20)
BUN/CREAT BLD: 11 (ref 9–20)
BUN/CREAT BLD: 14 (ref 9–20)
BUN/CREAT BLD: 16 (ref 9–20)
CALCIUM SERPL-MCNC: 8.5 MG/DL (ref 8.6–10.4)
CALCIUM SERPL-MCNC: 8.5 MG/DL (ref 8.6–10.4)
CALCIUM SERPL-MCNC: 8.7 MG/DL (ref 8.6–10.4)
CHLORIDE BLD-SCNC: 100 MMOL/L (ref 98–107)
CHLORIDE BLD-SCNC: 100 MMOL/L (ref 98–107)
CHLORIDE BLD-SCNC: 99 MMOL/L (ref 98–107)
CO2: 24 MMOL/L (ref 20–31)
CO2: 25 MMOL/L (ref 20–31)
CO2: 25 MMOL/L (ref 20–31)
CREAT SERPL-MCNC: 0.44 MG/DL (ref 0.7–1.2)
CREAT SERPL-MCNC: 0.44 MG/DL (ref 0.7–1.2)
CREAT SERPL-MCNC: 0.51 MG/DL (ref 0.7–1.2)
DIFFERENTIAL TYPE: ABNORMAL
EOSINOPHILS RELATIVE PERCENT: 0 %
GFR AFRICAN AMERICAN: >60 ML/MIN
GFR NON-AFRICAN AMERICAN: >60 ML/MIN
GFR SERPL CREATININE-BSD FRML MDRD: ABNORMAL ML/MIN/{1.73_M2}
GLUCOSE BLD-MCNC: 112 MG/DL (ref 75–110)
GLUCOSE BLD-MCNC: 113 MG/DL (ref 70–99)
GLUCOSE BLD-MCNC: 113 MG/DL (ref 75–110)
GLUCOSE BLD-MCNC: 114 MG/DL (ref 75–110)
GLUCOSE BLD-MCNC: 129 MG/DL (ref 75–110)
GLUCOSE BLD-MCNC: 134 MG/DL (ref 75–110)
GLUCOSE BLD-MCNC: 160 MG/DL (ref 75–110)
GLUCOSE BLD-MCNC: 180 MG/DL (ref 75–110)
GLUCOSE BLD-MCNC: 180 MG/DL (ref 75–110)
GLUCOSE BLD-MCNC: 187 MG/DL (ref 70–99)
GLUCOSE BLD-MCNC: 194 MG/DL (ref 75–110)
GLUCOSE BLD-MCNC: 202 MG/DL (ref 75–110)
GLUCOSE BLD-MCNC: 215 MG/DL (ref 75–110)
GLUCOSE BLD-MCNC: 217 MG/DL (ref 70–99)
GLUCOSE BLD-MCNC: 244 MG/DL (ref 75–110)
GLUCOSE BLD-MCNC: 318 MG/DL (ref 75–110)
HCT VFR BLD CALC: 38.7 % (ref 41–53)
HEMOGLOBIN: 13.6 G/DL (ref 13.5–17.5)
LYMPHOCYTES # BLD: 46 %
MAGNESIUM: 1.6 MG/DL (ref 1.6–2.6)
MAGNESIUM: 1.6 MG/DL (ref 1.6–2.6)
MAGNESIUM: 2.1 MG/DL (ref 1.6–2.6)
MCH RBC QN AUTO: 32.9 PG (ref 26–34)
MCHC RBC AUTO-ENTMCNC: 35 G/DL (ref 31–37)
MCV RBC AUTO: 94.1 FL (ref 80–100)
MONOCYTES # BLD: 11 %
PDW BLD-RTO: 11.8 % (ref 11–14.5)
PLATELET # BLD: 126 K/UL (ref 140–450)
PLATELET ESTIMATE: ABNORMAL
PMV BLD AUTO: 8.2 FL (ref 6–12)
POTASSIUM SERPL-SCNC: 3.5 MMOL/L (ref 3.7–5.3)
RBC # BLD: 4.11 M/UL (ref 4.5–5.9)
RBC # BLD: ABNORMAL 10*6/UL
SEG NEUTROPHILS: 43 %
SEGMENTED NEUTROPHILS ABSOLUTE COUNT: 1.2 K/UL (ref 1.8–7.7)
SODIUM BLD-SCNC: 135 MMOL/L (ref 135–144)
SODIUM BLD-SCNC: 137 MMOL/L (ref 135–144)
SODIUM BLD-SCNC: 137 MMOL/L (ref 135–144)
WBC # BLD: 2.9 K/UL (ref 3.5–11)
WBC # BLD: ABNORMAL 10*3/UL

## 2017-07-20 PROCEDURE — 2580000003 HC RX 258: Performed by: NURSE PRACTITIONER

## 2017-07-20 PROCEDURE — 85025 COMPLETE CBC W/AUTO DIFF WBC: CPT

## 2017-07-20 PROCEDURE — 83735 ASSAY OF MAGNESIUM: CPT

## 2017-07-20 PROCEDURE — 6370000000 HC RX 637 (ALT 250 FOR IP): Performed by: INTERNAL MEDICINE

## 2017-07-20 PROCEDURE — 80048 BASIC METABOLIC PNL TOTAL CA: CPT

## 2017-07-20 PROCEDURE — 6370000000 HC RX 637 (ALT 250 FOR IP): Performed by: NURSE PRACTITIONER

## 2017-07-20 PROCEDURE — 82947 ASSAY GLUCOSE BLOOD QUANT: CPT

## 2017-07-20 PROCEDURE — 99238 HOSP IP/OBS DSCHRG MGMT 30/<: CPT | Performed by: INTERNAL MEDICINE

## 2017-07-20 PROCEDURE — 6360000002 HC RX W HCPCS: Performed by: NURSE PRACTITIONER

## 2017-07-20 PROCEDURE — 36415 COLL VENOUS BLD VENIPUNCTURE: CPT

## 2017-07-20 RX ADMIN — DEXTROSE AND SODIUM CHLORIDE: 5; 450 INJECTION, SOLUTION INTRAVENOUS at 01:20

## 2017-07-20 RX ADMIN — HYDROMORPHONE HYDROCHLORIDE 0.5 MG: 1 INJECTION, SOLUTION INTRAMUSCULAR; INTRAVENOUS; SUBCUTANEOUS at 03:10

## 2017-07-20 RX ADMIN — INSULIN LISPRO 25 UNITS: 100 INJECTION, SOLUTION INTRAVENOUS; SUBCUTANEOUS at 11:51

## 2017-07-20 RX ADMIN — LISINOPRIL 5 MG: 5 TABLET ORAL at 08:26

## 2017-07-20 RX ADMIN — ENOXAPARIN SODIUM 40 MG: 40 INJECTION SUBCUTANEOUS at 08:26

## 2017-07-20 RX ADMIN — GABAPENTIN 400 MG: 300 CAPSULE ORAL at 08:26

## 2017-07-20 RX ADMIN — INSULIN GLARGINE 30 UNITS: 100 INJECTION, SOLUTION SUBCUTANEOUS at 08:25

## 2017-07-20 RX ADMIN — DEXTROSE AND SODIUM CHLORIDE: 5; 450 INJECTION, SOLUTION INTRAVENOUS at 08:34

## 2017-07-20 RX ADMIN — DULOXETINE 30 MG: 30 CAPSULE, DELAYED RELEASE ORAL at 08:26

## 2017-07-20 RX ADMIN — INSULIN LISPRO 25 UNITS: 100 INJECTION, SOLUTION INTRAVENOUS; SUBCUTANEOUS at 08:25

## 2017-07-20 RX ADMIN — MAGNESIUM SULFATE HEPTAHYDRATE 1 G: 1 INJECTION, SOLUTION INTRAVENOUS at 06:03

## 2017-07-20 RX ADMIN — HYDROCODONE BITARTRATE AND ACETAMINOPHEN 2 TABLET: 5; 325 TABLET ORAL at 01:19

## 2017-07-20 RX ADMIN — MAGNESIUM SULFATE HEPTAHYDRATE 1 G: 1 INJECTION, SOLUTION INTRAVENOUS at 07:01

## 2017-07-20 RX ADMIN — POTASSIUM CHLORIDE 30 MEQ: 20 TABLET, EXTENDED RELEASE ORAL at 12:40

## 2017-07-20 ASSESSMENT — PAIN DESCRIPTION - DESCRIPTORS
DESCRIPTORS: ACHING
DESCRIPTORS: ACHING

## 2017-07-20 ASSESSMENT — PAIN DESCRIPTION - LOCATION
LOCATION: HEAD
LOCATION: HEAD

## 2017-07-20 ASSESSMENT — PAIN SCALES - GENERAL
PAINLEVEL_OUTOF10: 0
PAINLEVEL_OUTOF10: 0
PAINLEVEL_OUTOF10: 2
PAINLEVEL_OUTOF10: 8
PAINLEVEL_OUTOF10: 8
PAINLEVEL_OUTOF10: 0
PAINLEVEL_OUTOF10: 0

## 2017-07-20 ASSESSMENT — PAIN DESCRIPTION - PAIN TYPE
TYPE: ACUTE PAIN
TYPE: ACUTE PAIN

## 2017-07-21 ENCOUNTER — TELEPHONE (OUTPATIENT)
Dept: FAMILY MEDICINE CLINIC | Age: 32
End: 2017-07-21

## 2017-07-26 ENCOUNTER — OFFICE VISIT (OUTPATIENT)
Dept: FAMILY MEDICINE CLINIC | Age: 32
End: 2017-07-26
Payer: MEDICARE

## 2017-07-26 VITALS
SYSTOLIC BLOOD PRESSURE: 136 MMHG | OXYGEN SATURATION: 98 % | WEIGHT: 167 LBS | HEART RATE: 97 BPM | BODY MASS INDEX: 25.39 KG/M2 | DIASTOLIC BLOOD PRESSURE: 80 MMHG

## 2017-07-26 DIAGNOSIS — E83.42 HYPOMAGNESEMIA: ICD-10-CM

## 2017-07-26 DIAGNOSIS — E87.6 HYPOKALEMIA: ICD-10-CM

## 2017-07-26 DIAGNOSIS — E08.10 DIABETIC KETOACIDOSIS WITHOUT COMA ASSOCIATED WITH DIABETES MELLITUS DUE TO UNDERLYING CONDITION (HCC): Primary | ICD-10-CM

## 2017-07-26 DIAGNOSIS — E10.42 TYPE 1 DIABETES MELLITUS WITH DIABETIC POLYNEUROPATHY (HCC): ICD-10-CM

## 2017-07-26 PROCEDURE — 99495 TRANSJ CARE MGMT MOD F2F 14D: CPT | Performed by: FAMILY MEDICINE

## 2017-07-26 RX ORDER — ALBUTEROL SULFATE 90 UG/1
1-2 AEROSOL, METERED RESPIRATORY (INHALATION) EVERY 6 HOURS PRN
Qty: 1 INHALER | Refills: 5 | Status: SHIPPED | OUTPATIENT
Start: 2017-07-26 | End: 2020-03-27 | Stop reason: ALTCHOICE

## 2017-07-26 ASSESSMENT — ENCOUNTER SYMPTOMS: BACK PAIN: 1

## 2017-09-25 DIAGNOSIS — E10.42 TYPE 1 DIABETES MELLITUS WITH DIABETIC POLYNEUROPATHY (HCC): ICD-10-CM

## 2017-09-25 RX ORDER — GABAPENTIN 300 MG/1
CAPSULE ORAL
Qty: 90 CAPSULE | Refills: 2 | Status: SHIPPED | OUTPATIENT
Start: 2017-09-27 | End: 2018-01-10 | Stop reason: SDUPTHER

## 2017-09-25 RX ORDER — GABAPENTIN 100 MG/1
CAPSULE ORAL
Qty: 90 CAPSULE | Refills: 2 | Status: SHIPPED | OUTPATIENT
Start: 2017-09-27 | End: 2018-01-10 | Stop reason: SDUPTHER

## 2017-11-08 ENCOUNTER — OFFICE VISIT (OUTPATIENT)
Dept: FAMILY MEDICINE CLINIC | Age: 32
End: 2017-11-08
Payer: MEDICARE

## 2017-11-08 VITALS
DIASTOLIC BLOOD PRESSURE: 88 MMHG | SYSTOLIC BLOOD PRESSURE: 130 MMHG | HEART RATE: 130 BPM | BODY MASS INDEX: 24.45 KG/M2 | WEIGHT: 160.8 LBS

## 2017-11-08 DIAGNOSIS — L02.11 NECK ABSCESS: Primary | ICD-10-CM

## 2017-11-08 PROCEDURE — G8484 FLU IMMUNIZE NO ADMIN: HCPCS | Performed by: NURSE PRACTITIONER

## 2017-11-08 PROCEDURE — 99213 OFFICE O/P EST LOW 20 MIN: CPT | Performed by: NURSE PRACTITIONER

## 2017-11-08 PROCEDURE — G8427 DOCREV CUR MEDS BY ELIG CLIN: HCPCS | Performed by: NURSE PRACTITIONER

## 2017-11-08 PROCEDURE — 4004F PT TOBACCO SCREEN RCVD TLK: CPT | Performed by: NURSE PRACTITIONER

## 2017-11-08 PROCEDURE — G8420 CALC BMI NORM PARAMETERS: HCPCS | Performed by: NURSE PRACTITIONER

## 2017-11-08 ASSESSMENT — ENCOUNTER SYMPTOMS
WHEEZING: 0
COUGH: 0
SHORTNESS OF BREATH: 0

## 2017-11-08 NOTE — PROGRESS NOTES
5 MM MISC Use with insulin pens 4x's daily as directed 100 each 5    SUMAtriptan (IMITREX) 5 MG/ACT nasal spray 1 spray by Nasal route daily as needed for Migraine May repeat x 1 dose in 2 hours, if headache persists. 1 each 5     No current facility-administered medications for this visit. Objective:   Physical Exam   Constitutional: He is oriented to person, place, and time. He appears well-developed and well-nourished. No distress. HENT:   Head: Normocephalic and atraumatic. Eyes: Pupils are equal, round, and reactive to light. Neck: Neck supple. Cardiovascular: Normal rate, regular rhythm and normal heart sounds. Pulmonary/Chest: Effort normal and breath sounds normal. No respiratory distress. He has no wheezes. He has no rales. Neurological: He is alert and oriented to person, place, and time. Nursing note and vitals reviewed. Assessment:      1. Neck abscess             Plan:      Discussed with patient that I would be happy to open the abscess today in the office however patient states that the last time it was open it was very painful for several days.    Will refer patient to wound care for evaluation due to patient's medical history and the fact there has been no improvement on 9 days of bactrim  Pt to return PRN

## 2017-11-09 ENCOUNTER — OFFICE VISIT (OUTPATIENT)
Dept: WOUND CARE | Age: 32
End: 2017-11-09
Payer: MEDICARE

## 2017-11-09 ENCOUNTER — HOSPITAL ENCOUNTER (OUTPATIENT)
Age: 32
Setting detail: SPECIMEN
Discharge: HOME | End: 2017-11-09
Payer: MEDICARE

## 2017-11-09 VITALS
DIASTOLIC BLOOD PRESSURE: 50 MMHG | TEMPERATURE: 97.4 F | SYSTOLIC BLOOD PRESSURE: 92 MMHG | BODY MASS INDEX: 22.62 KG/M2 | HEART RATE: 92 BPM | WEIGHT: 158 LBS | HEIGHT: 70 IN

## 2017-11-09 DIAGNOSIS — L02.11 ABSCESS OF NECK: ICD-10-CM

## 2017-11-09 DIAGNOSIS — L02.11 ABSCESS OF NECK: Primary | ICD-10-CM

## 2017-11-09 PROCEDURE — 87186 SC STD MICRODIL/AGAR DIL: CPT

## 2017-11-09 PROCEDURE — 86403 PARTICLE AGGLUT ANTBDY SCRN: CPT

## 2017-11-09 PROCEDURE — 87205 SMEAR GRAM STAIN: CPT

## 2017-11-09 PROCEDURE — 87075 CULTR BACTERIA EXCEPT BLOOD: CPT

## 2017-11-09 PROCEDURE — 10061 I&D ABSCESS COMP/MULTIPLE: CPT | Performed by: SURGERY

## 2017-11-09 PROCEDURE — 87070 CULTURE OTHR SPECIMN AEROBIC: CPT

## 2017-11-09 RX ORDER — SULFAMETHOXAZOLE AND TRIMETHOPRIM 800; 160 MG/1; MG/1
1 TABLET ORAL 2 TIMES DAILY
COMMUNITY
Start: 2017-10-31 | End: 2017-12-18 | Stop reason: ALTCHOICE

## 2017-11-09 RX ORDER — SULFAMETHOXAZOLE AND TRIMETHOPRIM 800; 160 MG/1; MG/1
1 TABLET ORAL 2 TIMES DAILY
Qty: 10 TABLET | Refills: 0 | Status: SHIPPED | OUTPATIENT
Start: 2017-11-09 | End: 2017-11-14

## 2017-11-09 RX ORDER — HYDROCODONE BITARTRATE AND ACETAMINOPHEN 5; 325 MG/1; MG/1
1 TABLET ORAL EVERY 6 HOURS PRN
Qty: 30 TABLET | Refills: 0 | Status: SHIPPED | OUTPATIENT
Start: 2017-11-09 | End: 2017-12-18 | Stop reason: ALTCHOICE

## 2017-11-09 RX ORDER — NAPROXEN 500 MG/1
500 TABLET ORAL 2 TIMES DAILY WITH MEALS
COMMUNITY
End: 2017-12-18 | Stop reason: ALTCHOICE

## 2017-11-09 NOTE — LETTER
[facility]  [place]  Phone: [phone]  Fax: [phone]    Katja Bravo MD        November 9, 2017     Patient: Gilberto Benitez   YOB: 1985   Date of Visit: 11/9/2017       To Whom It May Concern: It is my medical opinion that Gilberto Benitez be excused from work today. He may return to work on 11/10/17. .    If you have any questions or concerns, please don't hesitate to call.     Sincerely,        Katja Bravo MD

## 2017-11-09 NOTE — PATIENT INSTRUCTIONS
Incision and drainage of neck abcess today. Your wound was packed with iodoform ribbon gauze today, and covered with a dry dressing. Remove packing tomorrow. Repack open wound lightly using iodoform ribbon and a cotton tip applicator (Qtip). Cover with dry dressing or bandaid-type dressing. Change dressing daily. OK to shower with dressing off, rinse well and pat dry. Follow up in 1 week with Dr. Adriana Hutchins. Call clinic for any questions or concerns. SIGNS OF INFECTION  - Redness, swelling, skin hot  - Wound bed turns black or stringy yellow  - Foul odor  - Increased drainage or pus  - Increased pain  - Fever greater than 100F    CALL YOUR DOCTOR OR SEEK MEDICAL ATTENTION IF SIGNS OF INFECTION.   DO NOT WAIT UNTIL YOUR NEXT APPOINTMENT    Call the Wound Care Nurse with any other questions or concerns- [phone]

## 2017-11-09 NOTE — PROGRESS NOTES
Josie Chen is a 28 y.o. male          CC:    . Other (abscess posterior neck 2 1/2 weeks  I and D on 10/31/17 at [facility])      HISTORY OF PRESENT ILLNESS:    Patient is a 54-year-old male who presents one week after going to the ER for an abscess in the posterior neck. It was drained at [address] He was placed on Bactrim about one week ago. Today, he presents and its larger and more swollen and still very painful. Review of Systems:    General:  Fever: Negative  Weight Change:Negative  Night Sweats: Negative    Eye:  Blurry Vision:Negative  Double Vision: Negative    Ent:  Headaches: Positive  Sore throat: Negative    Allergy/Immunology:  Hives: Negative  Latex allergy: Negative    Hematology/Lymphatic:  Bleeding Problems: Negative  Blood Clots: Negative  Swollen Lymph Nodes: Negative    Lungs:  Cough: Negative  SOB: Negative  Wheezing:Negative    Cardiovascular:  Chest Pain: Negative  Palpitations:Negative    GI:   Decreased Appetite: Negative  Heartburn: Negative  Dysphagia: Negative  Nausea/Vomiting: Negative  Abdominal Pain: Negative  Change in Bowels:Negative  Constipation: Negative  Diarrhea: Negative  Rectal Bleeding: Negative    :   Dysuria: Negative  Increase Urinary Frequency/Urgency: Negative    Neuro:  Seizures: Negative  Confusion: Negative        PAST MEDICAL HISTORY:        Family History   Problem Relation Age of Onset    Diabetes Mother      type 1    Heart Disease Mother     Kidney Disease Mother     High Cholesterol Mother     High Blood Pressure Mother     High Blood Pressure Father     Diabetes Father      type 2     Social History     Social History    Marital status:      Spouse name: N/A    Number of children: N/A    Years of education: N/A     Occupational History    Not on file.      Social History Main Topics    Smoking status: Current Some Day Smoker     Packs/day: 0.20     Types: Cigarettes    Smokeless tobacco: Never Used      Comment: ECLAMB RRT 7/19/17  Alcohol use 10.8 oz/week     18 Cans of beer per week      Comment: has 18 beers in a weekend    Drug use:      Types: Marijuana    Sexual activity: Yes     Other Topics Concern    Not on file     Social History Narrative    ** Merged History Encounter **          Past Surgical History:   Procedure Laterality Date    APPENDECTOMY       Past Medical History:   Diagnosis Date    Asthma     Diabetes mellitus ([facility]     type 1    Diabetic ketoacidosis associated with type 1 diabetes mellitus ([facility] 04/28/2017    Head injury     Hyperlipidemia     Hypertension     Migraine      Current Outpatient Prescriptions on File Prior to Visit   Medication Sig Dispense Refill    gabapentin (NEURONTIN) 300 MG capsule take 1 capsule by mouth three times a day 90 capsule 2    gabapentin (NEURONTIN) 100 MG capsule take 1 capsule by mouth three times a day with 300 milligram for TOTAL DOSE OF 400MG three times a day 90 capsule 2    albuterol sulfate HFA (VENTOLIN HFA) 108 (90 Base) MCG/ACT inhaler Inhale 1-2 puffs into the lungs every 6 hours as needed for Wheezing or Shortness of Breath 1 Inhaler 5    insulin aspart (NOVOLOG FLEXPEN) 100 UNIT/ML injection pen Inject 27 Units into the skin 3 times daily (before meals) 8 Pen 5    insulin detemir (LEVEMIR FLEXTOUCH) 100 UNIT/ML injection pen Inject 30 Units into the skin daily At noon 5 Pen 3    EPINEPHrine (EPIPEN 2-KARY) 0.3 MG/0.3ML SOAJ injection Use as directed for allergic reaction 1 each 0    Glucose Blood (BLOOD GLUCOSE TEST STRIPS) STRP Indications: Diabetes Patient is testing 4 times daily due to fluctuating blood sugars and sliding scale use. 100 strip 11    Blood Glucose Monitoring Suppl JARED 1 kit by Does not apply route daily Indications: Diabetes, Type 1 1 Device 0    Lancets MISC Indications: Diabetes Patient is testing 4 times daily due to fluctuating blood sugars and sliding scale use.  100 each 11    Glucose Blood (BLOOD GLUCOSE TEST STRIPS) STRP Use to check blood glucose 4x's daily. Pt has OneTouch glucometer - please dispense strips for this meter. 100 strip 11    lisinopril (PRINIVIL;ZESTRIL) 5 MG tablet Take 5 mg by mouth daily      simvastatin (ZOCOR) 10 MG tablet Take 1 tablet by mouth nightly 30 tablet 5    Insulin Pen Needle (PEN NEEDLES 3/16\") 31G X 5 MM MISC Use with insulin pens 4x's daily as directed 100 each 5    SUMAtriptan (IMITREX) 5 MG/ACT nasal spray 1 spray by Nasal route daily as needed for Migraine May repeat x 1 dose in 2 hours, if headache persists. 1 each 5    DULoxetine (CYMBALTA) 30 MG extended release capsule Take 1 capsule by mouth daily 30 capsule 0     No current facility-administered medications on file prior to visit. Allergies as of 11/09/2017 - Review Complete 11/09/2017   Allergen Reaction Noted    Bee venom Hives 07/12/2017    Lipitor [atorvastatin] Hives 09/04/2014     PHYSICAL EXAM:    Blood pressure (!) 92/50, pulse 92, temperature 97.4 °F (36.3 °C), temperature source Tympanic, height 5' 10\" (1.778 m), weight 158 lb (71.7 kg). Gen:  A and O x 3, NAD, well nourished  Eyes:  Sclera non icterus, PERRL  Lungs:  CTA, symmetrical  Chest:  RRR, no murmurs  Abd:  Soft, NT, ND, no HSM, no bruits    Procedure:  Patient was prepped and draped in the usual sterile fashion. 1% lidocaine with epinephrine was used for local anesthetic. We then used a 15 blade scalpel and made a 1 cm x 0.5 cm incision and drainage. We got out about 10 mL of seropurulent drainage. We cultured it. We irrigated it with normal saline. We then packed the wound with iodoform gauze. ASSESS MENT:    Abscess posterior neck      PLAN:    1.  I and D today  2. Pack with iodoform guaze and change once a day  3. May shower  4.   RTC in 1 week  5.  5 more days of bactrim  6.  norco for pain

## 2017-11-14 LAB
CULTURE: ABNORMAL
DIRECT EXAM: ABNORMAL
DIRECT EXAM: ABNORMAL
Lab: ABNORMAL
ORGANISM: ABNORMAL
SPECIMEN DESCRIPTION: ABNORMAL
STATUS: ABNORMAL

## 2017-11-16 ENCOUNTER — OFFICE VISIT (OUTPATIENT)
Dept: WOUND CARE | Age: 32
End: 2017-11-16

## 2017-11-16 VITALS
DIASTOLIC BLOOD PRESSURE: 80 MMHG | SYSTOLIC BLOOD PRESSURE: 120 MMHG | HEART RATE: 76 BPM | TEMPERATURE: 98.6 F | BODY MASS INDEX: 23.19 KG/M2 | WEIGHT: 162 LBS | HEIGHT: 70 IN

## 2017-11-16 DIAGNOSIS — L02.11 ABSCESS OF NECK: Primary | ICD-10-CM

## 2017-11-16 PROCEDURE — 4004F PT TOBACCO SCREEN RCVD TLK: CPT | Performed by: SURGERY

## 2017-11-16 PROCEDURE — G8484 FLU IMMUNIZE NO ADMIN: HCPCS | Performed by: SURGERY

## 2017-11-16 PROCEDURE — 99024 POSTOP FOLLOW-UP VISIT: CPT | Performed by: SURGERY

## 2017-11-16 PROCEDURE — G8420 CALC BMI NORM PARAMETERS: HCPCS | Performed by: SURGERY

## 2017-11-16 PROCEDURE — G8427 DOCREV CUR MEDS BY ELIG CLIN: HCPCS | Performed by: SURGERY

## 2017-11-16 NOTE — PATIENT INSTRUCTIONS
Continue iodoform ribbon packing to neck wound daily. Cover with dry absorbent dressing, change daily and as needed for soiling or dislodged. OK to remove dressing prior to shower, rinse well & pat dry, using clean towel each shower. When no longer able to pack wound, may discontinue iodoform dressing, but continue using dry absorbent dressing to keep wound covered. May discontinue dressing when wound no longer draining and appears healed. Follow up with Dr. Melina King in 2-3 weeks. Call clinic with any questions or concerns. SIGNS OF INFECTION  - Redness, swelling, skin hot  - Wound bed turns black or stringy yellow  - Foul odor  - Increased drainage or pus  - Increased pain  - Fever greater than 100F    CALL YOUR DOCTOR OR SEEK MEDICAL ATTENTION IF SIGNS OF INFECTION.   DO NOT WAIT UNTIL YOUR NEXT APPOINTMENT    Call the Wound Care Nurse with any other questions or concerns- [phone]

## 2017-11-17 ENCOUNTER — TELEPHONE (OUTPATIENT)
Dept: PRIMARY CARE CLINIC | Age: 32
End: 2017-11-17

## 2017-11-17 NOTE — LETTER
[facility]  [address] [address]  Phone: [phone]  Fax: 119 Sharron Garcia, DO        November 17, 2017     Padmini Signs  [facility] [facility] [address]      Dear Ligia Levin: This letter is a reminder that your Cholesterol test is due. Please call our office to schedule an appointment. If you've already underwent or scheduled this procedure, please disregard this notice.     Sincerely,        Sapphire Francis DO

## 2017-12-07 ENCOUNTER — TELEPHONE (OUTPATIENT)
Dept: WOUND CARE | Age: 32
End: 2017-12-07

## 2017-12-18 ENCOUNTER — OFFICE VISIT (OUTPATIENT)
Dept: FAMILY MEDICINE CLINIC | Age: 32
End: 2017-12-18
Payer: MEDICARE

## 2017-12-18 ENCOUNTER — HOSPITAL ENCOUNTER (OUTPATIENT)
Dept: LAB | Age: 32
Setting detail: SPECIMEN
Discharge: HOME | End: 2017-12-18
Payer: MEDICARE

## 2017-12-18 VITALS
BODY MASS INDEX: 22.81 KG/M2 | WEIGHT: 159 LBS | DIASTOLIC BLOOD PRESSURE: 80 MMHG | OXYGEN SATURATION: 98 % | SYSTOLIC BLOOD PRESSURE: 130 MMHG | HEART RATE: 117 BPM

## 2017-12-18 DIAGNOSIS — G43.909 MIGRAINE WITHOUT STATUS MIGRAINOSUS, NOT INTRACTABLE, UNSPECIFIED MIGRAINE TYPE: ICD-10-CM

## 2017-12-18 DIAGNOSIS — E87.6 HYPOKALEMIA: ICD-10-CM

## 2017-12-18 DIAGNOSIS — E10.42 TYPE 1 DIABETES MELLITUS WITH DIABETIC POLYNEUROPATHY (HCC): ICD-10-CM

## 2017-12-18 DIAGNOSIS — E83.42 HYPOMAGNESEMIA: ICD-10-CM

## 2017-12-18 DIAGNOSIS — Z23 NEED FOR IMMUNIZATION AGAINST INFLUENZA: ICD-10-CM

## 2017-12-18 DIAGNOSIS — E10.40 TYPE 1 DIABETES MELLITUS WITH DIABETIC NEUROPATHY (HCC): Primary | ICD-10-CM

## 2017-12-18 LAB
ANION GAP SERPL CALCULATED.3IONS-SCNC: 18 MMOL/L (ref 9–17)
BUN BLDV-MCNC: 18 MG/DL (ref 6–20)
BUN/CREAT BLD: 31 (ref 9–20)
CALCIUM SERPL-MCNC: 10.1 MG/DL (ref 8.6–10.4)
CHLORIDE BLD-SCNC: 99 MMOL/L (ref 98–107)
CO2: 23 MMOL/L (ref 20–31)
CREAT SERPL-MCNC: 0.58 MG/DL (ref 0.7–1.2)
CREATININE URINE: 46.4 MG/DL (ref 39–259)
ESTIMATED AVERAGE GLUCOSE: 306 MG/DL
GFR AFRICAN AMERICAN: >60 ML/MIN
GFR NON-AFRICAN AMERICAN: >60 ML/MIN
GFR SERPL CREATININE-BSD FRML MDRD: ABNORMAL ML/MIN/{1.73_M2}
GFR SERPL CREATININE-BSD FRML MDRD: ABNORMAL ML/MIN/{1.73_M2}
GLUCOSE BLD-MCNC: 441 MG/DL (ref 70–99)
HBA1C MFR BLD: 12.3 % (ref 4.8–5.9)
MAGNESIUM: 1.8 MG/DL (ref 1.6–2.6)
MICROALBUMIN/CREAT 24H UR: 18 MG/L
MICROALBUMIN/CREAT UR-RTO: 39 MCG/MG CREAT
POTASSIUM SERPL-SCNC: 4 MMOL/L (ref 3.7–5.3)
SODIUM BLD-SCNC: 140 MMOL/L (ref 135–144)

## 2017-12-18 PROCEDURE — G8427 DOCREV CUR MEDS BY ELIG CLIN: HCPCS | Performed by: FAMILY MEDICINE

## 2017-12-18 PROCEDURE — 3046F HEMOGLOBIN A1C LEVEL >9.0%: CPT | Performed by: FAMILY MEDICINE

## 2017-12-18 PROCEDURE — 82570 ASSAY OF URINE CREATININE: CPT

## 2017-12-18 PROCEDURE — 99214 OFFICE O/P EST MOD 30 MIN: CPT | Performed by: FAMILY MEDICINE

## 2017-12-18 PROCEDURE — G8420 CALC BMI NORM PARAMETERS: HCPCS | Performed by: FAMILY MEDICINE

## 2017-12-18 PROCEDURE — 80048 BASIC METABOLIC PNL TOTAL CA: CPT

## 2017-12-18 PROCEDURE — 82043 UR ALBUMIN QUANTITATIVE: CPT

## 2017-12-18 PROCEDURE — 83036 HEMOGLOBIN GLYCOSYLATED A1C: CPT

## 2017-12-18 PROCEDURE — 4004F PT TOBACCO SCREEN RCVD TLK: CPT | Performed by: FAMILY MEDICINE

## 2017-12-18 PROCEDURE — 36415 COLL VENOUS BLD VENIPUNCTURE: CPT

## 2017-12-18 PROCEDURE — G8484 FLU IMMUNIZE NO ADMIN: HCPCS | Performed by: FAMILY MEDICINE

## 2017-12-18 PROCEDURE — 83735 ASSAY OF MAGNESIUM: CPT

## 2017-12-18 RX ORDER — DULOXETIN HYDROCHLORIDE 30 MG/1
30 CAPSULE, DELAYED RELEASE ORAL DAILY
Qty: 30 CAPSULE | Refills: 2 | Status: SHIPPED | OUTPATIENT
Start: 2017-12-18 | End: 2018-04-08 | Stop reason: SDUPTHER

## 2017-12-18 RX ORDER — SUMATRIPTAN 5 MG/1
1 SPRAY NASAL DAILY PRN
Qty: 1 EACH | Refills: 5 | Status: SHIPPED | OUTPATIENT
Start: 2017-12-18 | End: 2020-03-27 | Stop reason: ALTCHOICE

## 2017-12-18 NOTE — PROGRESS NOTES
Blood Pressure Father     Diabetes Father      type 2     Social History   Substance Use Topics    Smoking status: Current Some Day Smoker     Packs/day: 0.20     Types: Cigarettes    Smokeless tobacco: Never Used      Comment: ECLAMB RRT 7/19/17    Alcohol use 10.8 oz/week     18 Cans of beer per week      Comment: has 18 beers in a weekend      Current Outpatient Prescriptions   Medication Sig Dispense Refill    DULoxetine (CYMBALTA) 30 MG extended release capsule Take 1 capsule by mouth daily 30 capsule 2    SUMAtriptan (IMITREX) 5 MG/ACT nasal spray 1 spray by Nasal route daily as needed for Migraine May repeat x 1 dose in 2 hours, if headache persists. 1 each 5    gabapentin (NEURONTIN) 300 MG capsule take 1 capsule by mouth three times a day 90 capsule 2    gabapentin (NEURONTIN) 100 MG capsule take 1 capsule by mouth three times a day with 300 milligram for TOTAL DOSE OF 400MG three times a day 90 capsule 2    albuterol sulfate HFA (VENTOLIN HFA) 108 (90 Base) MCG/ACT inhaler Inhale 1-2 puffs into the lungs every 6 hours as needed for Wheezing or Shortness of Breath 1 Inhaler 5    insulin aspart (NOVOLOG FLEXPEN) 100 UNIT/ML injection pen Inject 27 Units into the skin 3 times daily (before meals) 8 Pen 5    insulin detemir (LEVEMIR FLEXTOUCH) 100 UNIT/ML injection pen Inject 30 Units into the skin daily At noon 5 Pen 3    EPINEPHrine (EPIPEN 2-KARY) 0.3 MG/0.3ML SOAJ injection Use as directed for allergic reaction 1 each 0    Glucose Blood (BLOOD GLUCOSE TEST STRIPS) STRP Indications: Diabetes Patient is testing 4 times daily due to fluctuating blood sugars and sliding scale use. 100 strip 11    Blood Glucose Monitoring Suppl JARED 1 kit by Does not apply route daily Indications: Diabetes, Type 1 1 Device 0    Lancets MISC Indications: Diabetes Patient is testing 4 times daily due to fluctuating blood sugars and sliding scale use.  100 each 11    lisinopril (PRINIVIL;ZESTRIL) 5 MG tablet Take 5 mg by mouth daily      simvastatin (ZOCOR) 10 MG tablet Take 1 tablet by mouth nightly 30 tablet 5    Insulin Pen Needle (PEN NEEDLES 3/16\") 31G X 5 MM MISC Use with insulin pens 4x's daily as directed 100 each 5     No current facility-administered medications for this visit. Allergies   Allergen Reactions    Bee Venom Hives     With swelling    Lipitor [Atorvastatin] Hives       Health Maintenance   Topic Date Due    Pneumococcal med risk (1 of 1 - PPSV23) 03/03/2004    Flu vaccine (1) 09/01/2017    Lipid screen  02/10/2018    Diabetic hemoglobin A1C test  03/18/2018    Diabetic foot exam  07/12/2018    Diabetic retinal exam  11/06/2018    Diabetic microalbuminuria test  12/18/2018    DTaP/Tdap/Td vaccine (6 - Td) 10/21/2025    HIV screen  Completed       Subjective:      Review of Systems   Constitutional: Negative for unexpected weight change. Respiratory: Negative for shortness of breath. Neurological: Positive for numbness (b/l feet with significant pain). Objective:     Vitals:    12/18/17 1621   BP: 130/80   Site: Right Arm   Position: Sitting   Cuff Size: Large Adult   Pulse: 117   SpO2: 98%   Weight: 159 lb (72.1 kg)     Physical Exam   Constitutional: He is oriented to person, place, and time. He appears well-developed and well-nourished. No distress. HENT:   Head: Normocephalic and atraumatic. Right Ear: External ear normal.   Left Ear: External ear normal.   Eyes: Conjunctivae are normal.   Cardiovascular: Normal rate, regular rhythm and normal heart sounds. Pulmonary/Chest: Effort normal and breath sounds normal. No respiratory distress. Abdominal: Soft. Bowel sounds are normal. He exhibits no distension. There is no tenderness. Musculoskeletal: He exhibits no edema. Neurological: He is alert and oriented to person, place, and time. Skin: Skin is warm and dry. Psychiatric: He has a normal mood and affect. Assessment:      1.  Type 1 diabetes mellitus

## 2017-12-24 ASSESSMENT — ENCOUNTER SYMPTOMS: SHORTNESS OF BREATH: 0

## 2018-01-10 DIAGNOSIS — E10.42 TYPE 1 DIABETES MELLITUS WITH DIABETIC POLYNEUROPATHY (HCC): ICD-10-CM

## 2018-01-10 RX ORDER — GABAPENTIN 100 MG/1
CAPSULE ORAL
Qty: 90 CAPSULE | Refills: 2 | Status: SHIPPED | OUTPATIENT
Start: 2018-01-10 | End: 2018-05-08 | Stop reason: SDUPTHER

## 2018-01-10 RX ORDER — GABAPENTIN 300 MG/1
CAPSULE ORAL
Qty: 90 CAPSULE | Refills: 2 | Status: SHIPPED | OUTPATIENT
Start: 2018-01-10 | End: 2018-05-08 | Stop reason: SDUPTHER

## 2018-01-11 NOTE — TELEPHONE ENCOUNTER
Controlled Substances Monitoring:     Attestation: The Prescription Monitoring Report for this patient was reviewed today.  (Sri Anglin, DO)  Documentation: No signs of potential drug abuse or diversion identified. (Sri Anglin, DO)

## 2018-04-08 DIAGNOSIS — E10.40 TYPE 1 DIABETES MELLITUS WITH DIABETIC NEUROPATHY (HCC): ICD-10-CM

## 2018-04-09 RX ORDER — DULOXETIN HYDROCHLORIDE 30 MG/1
CAPSULE, DELAYED RELEASE ORAL
Qty: 30 CAPSULE | Refills: 5 | Status: SHIPPED | OUTPATIENT
Start: 2018-04-09 | End: 2018-10-23 | Stop reason: SDUPTHER

## 2018-05-08 DIAGNOSIS — E10.42 TYPE 1 DIABETES MELLITUS WITH DIABETIC POLYNEUROPATHY (HCC): ICD-10-CM

## 2018-05-09 RX ORDER — GABAPENTIN 100 MG/1
CAPSULE ORAL
Qty: 90 CAPSULE | Refills: 0 | Status: SHIPPED | OUTPATIENT
Start: 2018-05-09 | End: 2018-06-17 | Stop reason: SDUPTHER

## 2018-05-09 RX ORDER — GABAPENTIN 300 MG/1
CAPSULE ORAL
Qty: 90 CAPSULE | Refills: 0 | Status: SHIPPED | OUTPATIENT
Start: 2018-05-09 | End: 2018-06-17 | Stop reason: SDUPTHER

## 2018-05-22 ENCOUNTER — OFFICE VISIT (OUTPATIENT)
Dept: FAMILY MEDICINE CLINIC | Age: 33
End: 2018-05-22
Payer: COMMERCIAL

## 2018-05-22 VITALS
HEIGHT: 70 IN | OXYGEN SATURATION: 99 % | SYSTOLIC BLOOD PRESSURE: 130 MMHG | DIASTOLIC BLOOD PRESSURE: 72 MMHG | BODY MASS INDEX: 21.62 KG/M2 | HEART RATE: 112 BPM | WEIGHT: 151 LBS

## 2018-05-22 DIAGNOSIS — E10.42 DIABETIC POLYNEUROPATHY ASSOCIATED WITH TYPE 1 DIABETES MELLITUS (HCC): Primary | ICD-10-CM

## 2018-05-22 DIAGNOSIS — E10.40 TYPE 1 DIABETES MELLITUS WITH DIABETIC NEUROPATHY (HCC): ICD-10-CM

## 2018-05-22 PROCEDURE — G8427 DOCREV CUR MEDS BY ELIG CLIN: HCPCS | Performed by: NURSE PRACTITIONER

## 2018-05-22 PROCEDURE — 99214 OFFICE O/P EST MOD 30 MIN: CPT | Performed by: NURSE PRACTITIONER

## 2018-05-22 PROCEDURE — G8420 CALC BMI NORM PARAMETERS: HCPCS | Performed by: NURSE PRACTITIONER

## 2018-05-22 PROCEDURE — 4004F PT TOBACCO SCREEN RCVD TLK: CPT | Performed by: NURSE PRACTITIONER

## 2018-05-22 PROCEDURE — 2022F DILAT RTA XM EVC RTNOPTHY: CPT | Performed by: NURSE PRACTITIONER

## 2018-05-22 PROCEDURE — 3046F HEMOGLOBIN A1C LEVEL >9.0%: CPT | Performed by: NURSE PRACTITIONER

## 2018-05-22 ASSESSMENT — ENCOUNTER SYMPTOMS
TROUBLE SWALLOWING: 0
VOMITING: 0
SHORTNESS OF BREATH: 0
ALLERGIC/IMMUNOLOGIC NEGATIVE: 1
SINUS PRESSURE: 0
NAUSEA: 0
DIARRHEA: 0
CONSTIPATION: 0
COUGH: 0
ABDOMINAL PAIN: 0
RESPIRATORY NEGATIVE: 1
CHEST TIGHTNESS: 0
EYES NEGATIVE: 1

## 2018-06-17 DIAGNOSIS — E10.42 TYPE 1 DIABETES MELLITUS WITH DIABETIC POLYNEUROPATHY (HCC): ICD-10-CM

## 2018-06-19 RX ORDER — GABAPENTIN 100 MG/1
CAPSULE ORAL
Qty: 90 CAPSULE | Refills: 0 | Status: SHIPPED | OUTPATIENT
Start: 2018-06-19 | End: 2018-08-09 | Stop reason: SDUPTHER

## 2018-06-19 RX ORDER — GABAPENTIN 300 MG/1
CAPSULE ORAL
Qty: 90 CAPSULE | Refills: 0 | Status: SHIPPED | OUTPATIENT
Start: 2018-06-19 | End: 2018-08-09 | Stop reason: SDUPTHER

## 2018-08-08 ENCOUNTER — TELEPHONE (OUTPATIENT)
Dept: FAMILY MEDICINE CLINIC | Age: 33
End: 2018-08-08

## 2018-08-09 DIAGNOSIS — E10.42 TYPE 1 DIABETES MELLITUS WITH DIABETIC POLYNEUROPATHY (HCC): ICD-10-CM

## 2018-08-10 DIAGNOSIS — E10.42 TYPE 1 DIABETES MELLITUS WITH DIABETIC POLYNEUROPATHY (HCC): ICD-10-CM

## 2018-08-10 RX ORDER — GABAPENTIN 300 MG/1
CAPSULE ORAL
Qty: 90 CAPSULE | Refills: 2 | Status: SHIPPED | OUTPATIENT
Start: 2018-08-10 | End: 2018-12-06 | Stop reason: SDUPTHER

## 2018-08-10 RX ORDER — GABAPENTIN 100 MG/1
CAPSULE ORAL
Qty: 90 CAPSULE | Refills: 2 | Status: SHIPPED | OUTPATIENT
Start: 2018-08-10 | End: 2018-12-06 | Stop reason: SDUPTHER

## 2018-08-10 RX ORDER — GABAPENTIN 300 MG/1
CAPSULE ORAL
Qty: 90 CAPSULE | Refills: 0 | OUTPATIENT
Start: 2018-08-10

## 2018-08-30 DIAGNOSIS — E08.10 DIABETIC KETOACIDOSIS WITHOUT COMA ASSOCIATED WITH DIABETES MELLITUS DUE TO UNDERLYING CONDITION (HCC): Primary | ICD-10-CM

## 2018-09-13 DIAGNOSIS — E10.42 TYPE 1 DIABETES MELLITUS WITH DIABETIC POLYNEUROPATHY (HCC): ICD-10-CM

## 2018-09-13 RX ORDER — PEN NEEDLE, DIABETIC 30 GX5/16"
NEEDLE, DISPOSABLE MISCELLANEOUS
Qty: 100 EACH | Refills: 11 | Status: SHIPPED | OUTPATIENT
Start: 2018-09-13 | End: 2019-05-10

## 2018-09-13 RX ORDER — GLUCOSAMINE HCL/CHONDROITIN SU 500-400 MG
CAPSULE ORAL
Qty: 100 STRIP | Refills: 11 | Status: SHIPPED | OUTPATIENT
Start: 2018-09-13 | End: 2020-04-02 | Stop reason: SDUPTHER

## 2018-09-13 NOTE — TELEPHONE ENCOUNTER
Called and spoke with the patient's wife. Asked how he has been taking his medication since he should have been out 01/2018. [facility] reported that he has not been because he could not afford them. She stated they found a box in their outside fridge and she has since starting making him take them. Asked if she knew how many units of each he was using. She stated she did not. That he takes the long acting once a day and uses the other one with a sliding scale.

## 2018-09-14 NOTE — TELEPHONE ENCOUNTER
Can switch to Humalog Kwikpen, but will need to know exactly how pt is taking this before I send in new Rx. Also, pt had been referred to Dr. Micheal Sanchez in 7/2017 - is he seeing him?

## 2018-09-28 DIAGNOSIS — E10.42 TYPE 1 DIABETES MELLITUS WITH DIABETIC POLYNEUROPATHY (HCC): Primary | ICD-10-CM

## 2018-09-28 NOTE — TELEPHONE ENCOUNTER
Order form for DM test strips completed & faxed back. Raymundo Short pended. Are the units comparable?

## 2018-10-02 ENCOUNTER — TELEPHONE (OUTPATIENT)
Dept: PRIMARY CARE CLINIC | Age: 33
End: 2018-10-02

## 2018-10-23 DIAGNOSIS — E10.40 TYPE 1 DIABETES MELLITUS WITH DIABETIC NEUROPATHY (HCC): ICD-10-CM

## 2018-10-23 RX ORDER — DULOXETIN HYDROCHLORIDE 30 MG/1
CAPSULE, DELAYED RELEASE ORAL
Qty: 30 CAPSULE | Refills: 1 | Status: SHIPPED | OUTPATIENT
Start: 2018-10-23 | End: 2019-02-11 | Stop reason: SDUPTHER

## 2018-10-31 ENCOUNTER — TELEPHONE (OUTPATIENT)
Dept: FAMILY MEDICINE CLINIC | Age: 33
End: 2018-10-31

## 2018-10-31 NOTE — TELEPHONE ENCOUNTER
Called and left message informing the patient that he is overdue for his Hga1c and to call the office back to discuss.

## 2018-12-06 DIAGNOSIS — E78.2 MIXED HYPERLIPIDEMIA: ICD-10-CM

## 2018-12-06 DIAGNOSIS — E10.42 TYPE 1 DIABETES MELLITUS WITH DIABETIC POLYNEUROPATHY (HCC): Primary | ICD-10-CM

## 2018-12-10 RX ORDER — GABAPENTIN 300 MG/1
CAPSULE ORAL
Qty: 90 CAPSULE | Refills: 0 | Status: SHIPPED | OUTPATIENT
Start: 2018-12-11 | End: 2019-01-17 | Stop reason: SDUPTHER

## 2018-12-10 RX ORDER — GABAPENTIN 100 MG/1
CAPSULE ORAL
Qty: 90 CAPSULE | Refills: 0 | Status: SHIPPED | OUTPATIENT
Start: 2018-12-11 | End: 2019-01-17 | Stop reason: SDUPTHER

## 2018-12-14 ENCOUNTER — HOSPITAL ENCOUNTER (OUTPATIENT)
Dept: LAB | Age: 33
Discharge: HOME | End: 2018-12-14
Payer: COMMERCIAL

## 2018-12-14 DIAGNOSIS — E10.42 TYPE 1 DIABETES MELLITUS WITH DIABETIC POLYNEUROPATHY (HCC): ICD-10-CM

## 2018-12-14 DIAGNOSIS — E78.2 MIXED HYPERLIPIDEMIA: ICD-10-CM

## 2018-12-14 LAB
ALBUMIN SERPL-MCNC: 4.8 G/DL (ref 3.5–5.2)
ALBUMIN/GLOBULIN RATIO: 1.8 (ref 1–2.5)
ALP BLD-CCNC: 120 U/L (ref 40–129)
ALT SERPL-CCNC: 17 U/L (ref 5–41)
ANION GAP SERPL CALCULATED.3IONS-SCNC: 14 MMOL/L (ref 9–17)
AST SERPL-CCNC: 13 U/L
BILIRUB SERPL-MCNC: 0.26 MG/DL (ref 0.3–1.2)
BUN BLDV-MCNC: 17 MG/DL (ref 6–20)
BUN/CREAT BLD: 29 (ref 9–20)
CALCIUM SERPL-MCNC: 9.9 MG/DL (ref 8.6–10.4)
CHLORIDE BLD-SCNC: 100 MMOL/L (ref 98–107)
CHOLESTEROL/HDL RATIO: 3.3
CHOLESTEROL: 235 MG/DL
CO2: 26 MMOL/L (ref 20–31)
CREAT SERPL-MCNC: 0.59 MG/DL (ref 0.7–1.2)
CREATININE URINE: 45 MG/DL (ref 39–259)
ESTIMATED AVERAGE GLUCOSE: ABNORMAL MG/DL
GFR AFRICAN AMERICAN: >60 ML/MIN
GFR NON-AFRICAN AMERICAN: >60 ML/MIN
GFR SERPL CREATININE-BSD FRML MDRD: ABNORMAL ML/MIN/{1.73_M2}
GFR SERPL CREATININE-BSD FRML MDRD: ABNORMAL ML/MIN/{1.73_M2}
GLUCOSE BLD-MCNC: 234 MG/DL (ref 70–99)
HBA1C MFR BLD: >14 % (ref 4.8–5.9)
HDLC SERPL-MCNC: 71 MG/DL
LDL CHOLESTEROL: 132 MG/DL (ref 0–130)
MICROALBUMIN/CREAT 24H UR: <12 MG/L
MICROALBUMIN/CREAT UR-RTO: NORMAL MCG/MG CREAT
POTASSIUM SERPL-SCNC: 3.6 MMOL/L (ref 3.7–5.3)
SODIUM BLD-SCNC: 140 MMOL/L (ref 135–144)
TOTAL PROTEIN: 7.4 G/DL (ref 6.4–8.3)
TRIGL SERPL-MCNC: 159 MG/DL
VLDLC SERPL CALC-MCNC: ABNORMAL MG/DL (ref 1–30)

## 2018-12-14 PROCEDURE — 82043 UR ALBUMIN QUANTITATIVE: CPT

## 2018-12-14 PROCEDURE — 80053 COMPREHEN METABOLIC PANEL: CPT

## 2018-12-14 PROCEDURE — 36415 COLL VENOUS BLD VENIPUNCTURE: CPT

## 2018-12-14 PROCEDURE — 80061 LIPID PANEL: CPT

## 2018-12-14 PROCEDURE — 82570 ASSAY OF URINE CREATININE: CPT

## 2018-12-14 PROCEDURE — 83036 HEMOGLOBIN GLYCOSYLATED A1C: CPT

## 2018-12-18 ENCOUNTER — OFFICE VISIT (OUTPATIENT)
Dept: FAMILY MEDICINE CLINIC | Age: 33
End: 2018-12-18
Payer: COMMERCIAL

## 2018-12-18 VITALS
HEART RATE: 107 BPM | BODY MASS INDEX: 22.33 KG/M2 | HEIGHT: 70 IN | OXYGEN SATURATION: 97 % | DIASTOLIC BLOOD PRESSURE: 70 MMHG | WEIGHT: 156 LBS | SYSTOLIC BLOOD PRESSURE: 114 MMHG

## 2018-12-18 DIAGNOSIS — H61.23 BILATERAL IMPACTED CERUMEN: ICD-10-CM

## 2018-12-18 DIAGNOSIS — E78.2 MIXED HYPERLIPIDEMIA: ICD-10-CM

## 2018-12-18 DIAGNOSIS — E10.40 TYPE 1 DIABETES MELLITUS WITH DIABETIC NEUROPATHY (HCC): Primary | ICD-10-CM

## 2018-12-18 PROBLEM — E08.10 DIABETIC KETOACIDOSIS WITHOUT COMA ASSOCIATED WITH DIABETES MELLITUS DUE TO UNDERLYING CONDITION (HCC): Status: RESOLVED | Noted: 2017-07-18 | Resolved: 2018-12-18

## 2018-12-18 PROCEDURE — G8427 DOCREV CUR MEDS BY ELIG CLIN: HCPCS | Performed by: FAMILY MEDICINE

## 2018-12-18 PROCEDURE — 4004F PT TOBACCO SCREEN RCVD TLK: CPT | Performed by: FAMILY MEDICINE

## 2018-12-18 PROCEDURE — 99214 OFFICE O/P EST MOD 30 MIN: CPT | Performed by: FAMILY MEDICINE

## 2018-12-18 PROCEDURE — 2022F DILAT RTA XM EVC RTNOPTHY: CPT | Performed by: FAMILY MEDICINE

## 2018-12-18 PROCEDURE — G8484 FLU IMMUNIZE NO ADMIN: HCPCS | Performed by: FAMILY MEDICINE

## 2018-12-18 PROCEDURE — 3046F HEMOGLOBIN A1C LEVEL >9.0%: CPT | Performed by: FAMILY MEDICINE

## 2018-12-18 PROCEDURE — G8420 CALC BMI NORM PARAMETERS: HCPCS | Performed by: FAMILY MEDICINE

## 2018-12-18 RX ORDER — LISINOPRIL 2.5 MG/1
2.5 TABLET ORAL DAILY
Qty: 30 TABLET | Refills: 11 | Status: SHIPPED | OUTPATIENT
Start: 2018-12-18 | End: 2020-12-30 | Stop reason: DRUGHIGH

## 2018-12-18 RX ORDER — SIMVASTATIN 20 MG
20 TABLET ORAL EVERY EVENING
Qty: 30 TABLET | Refills: 5 | Status: SHIPPED | OUTPATIENT
Start: 2018-12-18 | End: 2020-04-02 | Stop reason: SDUPTHER

## 2018-12-18 ASSESSMENT — ENCOUNTER SYMPTOMS
ABDOMINAL DISTENTION: 1
DIARRHEA: 0
NAUSEA: 0
ABDOMINAL PAIN: 1
SHORTNESS OF BREATH: 0
CONSTIPATION: 0
VOMITING: 0

## 2018-12-18 ASSESSMENT — PATIENT HEALTH QUESTIONNAIRE - PHQ9
2. FEELING DOWN, DEPRESSED OR HOPELESS: 0
SUM OF ALL RESPONSES TO PHQ QUESTIONS 1-9: 0
SUM OF ALL RESPONSES TO PHQ QUESTIONS 1-9: 0
1. LITTLE INTEREST OR PLEASURE IN DOING THINGS: 0
SUM OF ALL RESPONSES TO PHQ9 QUESTIONS 1 & 2: 0

## 2019-01-04 ENCOUNTER — TELEPHONE (OUTPATIENT)
Dept: FAMILY MEDICINE CLINIC | Age: 34
End: 2019-01-04

## 2019-01-17 DIAGNOSIS — E10.42 TYPE 1 DIABETES MELLITUS WITH DIABETIC POLYNEUROPATHY (HCC): ICD-10-CM

## 2019-01-19 RX ORDER — GABAPENTIN 300 MG/1
CAPSULE ORAL
Qty: 150 CAPSULE | Refills: 0 | Status: SHIPPED | OUTPATIENT
Start: 2019-01-19 | End: 2019-05-10

## 2019-01-19 RX ORDER — GABAPENTIN 100 MG/1
CAPSULE ORAL
Qty: 30 CAPSULE | Refills: 0 | Status: SHIPPED | OUTPATIENT
Start: 2019-01-19 | End: 2019-04-29 | Stop reason: ALTCHOICE

## 2019-01-21 ENCOUNTER — OFFICE VISIT (OUTPATIENT)
Dept: PRIMARY CARE CLINIC | Age: 34
End: 2019-01-21
Payer: COMMERCIAL

## 2019-01-21 ENCOUNTER — HOSPITAL ENCOUNTER (OUTPATIENT)
Age: 34
Setting detail: SPECIMEN
Discharge: HOME | End: 2019-01-21
Payer: COMMERCIAL

## 2019-01-21 VITALS
TEMPERATURE: 97.9 F | SYSTOLIC BLOOD PRESSURE: 102 MMHG | DIASTOLIC BLOOD PRESSURE: 70 MMHG | RESPIRATION RATE: 16 BRPM | BODY MASS INDEX: 23.31 KG/M2 | HEART RATE: 104 BPM | WEIGHT: 157.4 LBS | OXYGEN SATURATION: 99 % | HEIGHT: 69 IN

## 2019-01-21 DIAGNOSIS — R11.0 NAUSEA: Primary | ICD-10-CM

## 2019-01-21 DIAGNOSIS — R10.9 FLANK PAIN: ICD-10-CM

## 2019-01-21 DIAGNOSIS — R10.13 EPIGASTRIC PAIN: ICD-10-CM

## 2019-01-21 DIAGNOSIS — H00.012 HORDEOLUM OF RIGHT LOWER EYELID, UNSPECIFIED HORDEOLUM TYPE: ICD-10-CM

## 2019-01-21 LAB
-: ABNORMAL
AMORPHOUS: ABNORMAL
BACTERIA: ABNORMAL
BILIRUBIN URINE: NEGATIVE
CASTS UA: ABNORMAL /LPF (ref 0–2)
COLOR: ABNORMAL
COMMENT UA: ABNORMAL
CRYSTALS, UA: ABNORMAL /HPF
EPITHELIAL CELLS UA: ABNORMAL /HPF (ref 0–5)
GLUCOSE URINE: ABNORMAL
KETONES, URINE: ABNORMAL
LEUKOCYTE ESTERASE, URINE: NEGATIVE
MUCUS: ABNORMAL
NITRITE, URINE: NEGATIVE
OTHER OBSERVATIONS UA: ABNORMAL
PH UA: 6 (ref 5–6)
PROTEIN UA: NEGATIVE
RBC UA: ABNORMAL /HPF (ref 0–4)
RENAL EPITHELIAL, UA: ABNORMAL /HPF
SPECIFIC GRAVITY UA: 1.02 (ref 1.01–1.02)
TRICHOMONAS: ABNORMAL
TURBIDITY: ABNORMAL
URINE HGB: NEGATIVE
UROBILINOGEN, URINE: NORMAL
WBC UA: ABNORMAL /HPF (ref 0–4)
YEAST: ABNORMAL

## 2019-01-21 PROCEDURE — 4004F PT TOBACCO SCREEN RCVD TLK: CPT | Performed by: NURSE PRACTITIONER

## 2019-01-21 PROCEDURE — 81001 URINALYSIS AUTO W/SCOPE: CPT

## 2019-01-21 PROCEDURE — G8484 FLU IMMUNIZE NO ADMIN: HCPCS | Performed by: NURSE PRACTITIONER

## 2019-01-21 PROCEDURE — 99214 OFFICE O/P EST MOD 30 MIN: CPT | Performed by: NURSE PRACTITIONER

## 2019-01-21 PROCEDURE — G8420 CALC BMI NORM PARAMETERS: HCPCS | Performed by: NURSE PRACTITIONER

## 2019-01-21 PROCEDURE — G8427 DOCREV CUR MEDS BY ELIG CLIN: HCPCS | Performed by: NURSE PRACTITIONER

## 2019-01-21 RX ORDER — ONDANSETRON 4 MG/1
4 TABLET, ORALLY DISINTEGRATING ORAL EVERY 8 HOURS PRN
Qty: 20 TABLET | Refills: 0 | Status: SHIPPED | OUTPATIENT
Start: 2019-01-21 | End: 2019-04-29 | Stop reason: ALTCHOICE

## 2019-01-21 ASSESSMENT — ENCOUNTER SYMPTOMS
DIARRHEA: 0
NAUSEA: 1
BLOOD IN STOOL: 0
BOWEL INCONTINENCE: 0
ABDOMINAL PAIN: 1
EYE REDNESS: 0
EYE ITCHING: 0
SINUS PRESSURE: 0
RHINORRHEA: 1
BLURRED VISION: 0
RESPIRATORY NEGATIVE: 1
DOUBLE VISION: 0
EYE DISCHARGE: 0
VOMITING: 0
SORE THROAT: 0
PHOTOPHOBIA: 1
FOREIGN BODY SENSATION: 0

## 2019-02-01 ENCOUNTER — HOSPITAL ENCOUNTER (OUTPATIENT)
Dept: LAB | Age: 34
Discharge: HOME | End: 2019-02-01
Payer: COMMERCIAL

## 2019-02-01 LAB
C-PEPTIDE: 1 NG/ML (ref 1.1–4.4)
COMMENT: ABNORMAL
FOLLICLE STIMULATING HORMONE: 3.7 U/L (ref 1.5–12.4)
GLUCOSE BLD-MCNC: 323 MG/DL (ref 70–99)
HCT VFR BLD CALC: 45.4 % (ref 41–53)
HEMOGLOBIN: 15.5 G/DL (ref 13.5–17.5)
LH: 4.9 U/L (ref 1.7–8.6)
MCH RBC QN AUTO: 34 PG (ref 26–34)
MCHC RBC AUTO-ENTMCNC: 34 G/DL (ref 31–37)
MCV RBC AUTO: 99.9 FL (ref 80–100)
NRBC AUTOMATED: NORMAL PER 100 WBC
PDW BLD-RTO: 12.2 % (ref 11–14.5)
PLATELET # BLD: 275 K/UL (ref 140–450)
PMV BLD AUTO: 8.2 FL (ref 6–12)
POTASSIUM SERPL-SCNC: 4 MMOL/L (ref 3.7–5.3)
PROLACTIN: 3.2 UG/L (ref 4.04–15.2)
PROSTATE SPECIFIC ANTIGEN: 0.67 UG/L
RBC # BLD: 4.55 M/UL (ref 4.5–5.9)
TESTOSTERONE TOTAL: 454 NG/DL (ref 220–1000)
WBC # BLD: 6.8 K/UL (ref 3.5–11)

## 2019-02-01 PROCEDURE — 83001 ASSAY OF GONADOTROPIN (FSH): CPT

## 2019-02-01 PROCEDURE — 84153 ASSAY OF PSA TOTAL: CPT

## 2019-02-01 PROCEDURE — 84403 ASSAY OF TOTAL TESTOSTERONE: CPT

## 2019-02-01 PROCEDURE — 85027 COMPLETE CBC AUTOMATED: CPT

## 2019-02-01 PROCEDURE — 83002 ASSAY OF GONADOTROPIN (LH): CPT

## 2019-02-01 PROCEDURE — 84146 ASSAY OF PROLACTIN: CPT

## 2019-02-01 PROCEDURE — 84681 ASSAY OF C-PEPTIDE: CPT

## 2019-02-01 PROCEDURE — 84244 ASSAY OF RENIN: CPT

## 2019-02-01 PROCEDURE — 84132 ASSAY OF SERUM POTASSIUM: CPT

## 2019-02-01 PROCEDURE — 82088 ASSAY OF ALDOSTERONE: CPT

## 2019-02-01 PROCEDURE — 82947 ASSAY GLUCOSE BLOOD QUANT: CPT

## 2019-02-01 PROCEDURE — 86341 ISLET CELL ANTIBODY: CPT

## 2019-02-01 PROCEDURE — 36415 COLL VENOUS BLD VENIPUNCTURE: CPT

## 2019-02-04 LAB
ALDOSTERONE COMMENT: NORMAL
ALDOSTERONE: 9.3 NG/DL
ISLET CELL ANTIBODY: NORMAL

## 2019-02-05 LAB
RENIN ACTIVITY: 16.8 NG/ML/HR
RENIN COMMENT: NORMAL

## 2019-02-06 LAB — GLUTAMIC ACID DECARB AB: <5 IU/ML (ref 0–5)

## 2019-02-11 DIAGNOSIS — E10.40 TYPE 1 DIABETES MELLITUS WITH DIABETIC NEUROPATHY (HCC): ICD-10-CM

## 2019-02-12 RX ORDER — DULOXETIN HYDROCHLORIDE 30 MG/1
CAPSULE, DELAYED RELEASE ORAL
Qty: 30 CAPSULE | Refills: 5 | Status: SHIPPED | OUTPATIENT
Start: 2019-02-12 | End: 2019-04-29 | Stop reason: DRUGHIGH

## 2019-04-11 ENCOUNTER — OFFICE VISIT (OUTPATIENT)
Dept: FAMILY MEDICINE CLINIC | Age: 34
End: 2019-04-11
Payer: COMMERCIAL

## 2019-04-11 VITALS
HEART RATE: 110 BPM | WEIGHT: 180 LBS | OXYGEN SATURATION: 99 % | DIASTOLIC BLOOD PRESSURE: 84 MMHG | SYSTOLIC BLOOD PRESSURE: 130 MMHG | BODY MASS INDEX: 25.77 KG/M2 | HEIGHT: 70 IN

## 2019-04-11 DIAGNOSIS — E10.40 TYPE 1 DIABETES MELLITUS WITH DIABETIC NEUROPATHY (HCC): Primary | ICD-10-CM

## 2019-04-11 PROCEDURE — 3046F HEMOGLOBIN A1C LEVEL >9.0%: CPT | Performed by: NURSE PRACTITIONER

## 2019-04-11 PROCEDURE — 99213 OFFICE O/P EST LOW 20 MIN: CPT | Performed by: NURSE PRACTITIONER

## 2019-04-11 PROCEDURE — G8419 CALC BMI OUT NRM PARAM NOF/U: HCPCS | Performed by: NURSE PRACTITIONER

## 2019-04-11 PROCEDURE — G8427 DOCREV CUR MEDS BY ELIG CLIN: HCPCS | Performed by: NURSE PRACTITIONER

## 2019-04-11 PROCEDURE — 4004F PT TOBACCO SCREEN RCVD TLK: CPT | Performed by: NURSE PRACTITIONER

## 2019-04-11 PROCEDURE — 2022F DILAT RTA XM EVC RTNOPTHY: CPT | Performed by: NURSE PRACTITIONER

## 2019-04-11 ASSESSMENT — PATIENT HEALTH QUESTIONNAIRE - PHQ9
SUM OF ALL RESPONSES TO PHQ QUESTIONS 1-9: 2
SUM OF ALL RESPONSES TO PHQ QUESTIONS 1-9: 2
2. FEELING DOWN, DEPRESSED OR HOPELESS: 1
1. LITTLE INTEREST OR PLEASURE IN DOING THINGS: 1
SUM OF ALL RESPONSES TO PHQ9 QUESTIONS 1 & 2: 2

## 2019-04-11 ASSESSMENT — ENCOUNTER SYMPTOMS
SHORTNESS OF BREATH: 0
COUGH: 0
WHEEZING: 0

## 2019-04-11 NOTE — LETTER
[facility] [address] [place] [address]  Phone: [phone]  Fax: [phone]    BEATRIS Soto CNP        April 11, 2019     Patient: Salvador Trinh   YOB: 1985   Date of Visit: 4/11/2019       To Whom it May Concern: Salvador Trinh was seen in my clinic on 4/11/2019. He may return to work on 4/11/2019 with no restrictions. If you have any questions or concerns, please don't hesitate to call.     Sincerely,         BEATRIS Soto CNP

## 2019-04-11 NOTE — PROGRESS NOTES
2019     Angelo Parada (:  1985) is a 29 y.o. male, here for evaluation of the following medical concerns:    Pt presents to the clinic to discuss his peripheral neuropathy. He is a type 1 diabetic who is uncontrolled with a last hemoglobin A1C of >14 in 2018. Pt is seeing Dr. Andreea Rivera and is scheduled to see him on . He has an insulin pump now and feels that his blood sugars are in better control. He states that since having the pump he has not had any blood sugars over 200. Pt is complaining of intense foot pain and burning that is keeping him up at night. He was previously on 400mg TID of Neurontin. When he went Dr. Andreea Rivera they increased his Neurontin to 800mg TID. He thinks that helped for a few weeks but now the pain seems to be worsening. He is also cymbalta for pain. He went to the ER in [place] last week and got \"a shot for pain. \" Pt does not know what kind of shot he got but states that it didn't last.  He needs a note stating that he can return to work with no restrictions.        Past Medical History:   Diagnosis Date    Asthma     Diabetes mellitus ([facility]     type 1    Diabetic ketoacidosis associated with type 1 diabetes mellitus ([facility] [facility] 2017    Head injury     Hyperlipidemia     Hypertension     Migraine        Past Surgical History:   Procedure Laterality Date    APPENDECTOMY         Social History     Socioeconomic History    Marital status:      Spouse name: None    Number of children: None    Years of education: None    Highest education level: None   Occupational History    None   Social Needs    Financial resource strain: None    Food insecurity:     Worry: None     Inability: None    Transportation needs:     Medical: None     Non-medical: None   Tobacco Use    Smoking status: Current Some Day Smoker     Packs/day: 0.20     Types: Cigarettes    Smokeless tobacco: Never Used    Tobacco comment: ECLAMB RRT 17   Substance and Sexual Activity    Alcohol use: Yes     Alcohol/week: 10.8 oz     Types: 18 Cans of beer per week     Comment: has 18 beers in a weekend    Drug use: Yes     Types: Marijuana    Sexual activity: Yes   Lifestyle    Physical activity:     Days per week: None     Minutes per session: None    Stress: None   Relationships    Social connections:     Talks on phone: None     Gets together: None     Attends Catholic service: None     Active member of club or organization: None     Attends meetings of clubs or organizations: None     Relationship status: None    Intimate partner violence:     Fear of current or ex partner: None     Emotionally abused: None     Physically abused: None     Forced sexual activity: None   Other Topics Concern    None   Social History Narrative    ** Merged History Encounter **            Family History   Problem Relation Age of Onset    Diabetes Mother         type 1    Heart Disease Mother     Kidney Disease Mother     High Cholesterol Mother     High Blood Pressure Mother     High Blood Pressure Father     Diabetes Father         type 2       Allergies   Allergen Reactions    Bee Venom Hives     With swelling    Lipitor [Atorvastatin] Hives       Current Outpatient Medications   Medication Sig Dispense Refill    DULoxetine (CYMBALTA) 30 MG extended release capsule take 1 capsule by mouth once daily 30 capsule 5    ondansetron (ZOFRAN-ODT) 4 MG disintegrating tablet Take 1 tablet by mouth every 8 hours as needed for Nausea or Vomiting 20 tablet 0    gabapentin (NEURONTIN) 300 MG capsule Take 2 capsules by mouth two times a day in AM & PM.  Take 1 capsule in afternoon WITH THE 100MG DOSE. Penelope Never 150 capsule 0    gabapentin (NEURONTIN) 100 MG capsule Take 1 capsule by mouth in afternoon WITH  MG CAPSULE. . 30 capsule 0    simvastatin (ZOCOR) 20 MG tablet Take 1 tablet by mouth every evening 30 tablet 5    lisinopril (PRINIVIL;ZESTRIL) 2.5 MG tablet Take 1 tablet by mouth daily 30 tablet 11    insulin lispro (HUMALOG KWIKPEN) 100 UNIT/ML pen Inject 27 Units into the skin 3 times daily (before meals) Adjust and increase dose based on sliding scale. 10 pen 0    insulin detemir (LEVEMIR FLEXTOUCH) 100 UNIT/ML injection pen Inject 30 Units into the skin daily At noon 5 pen 1    Insulin Pen Needle (PEN NEEDLES 3/16\") 31G X 5 MM MISC Use with insulin pens 4x's daily as directed 100 each 11    blood glucose monitor strips Patient is testing 4 times daily due to fluctuating blood sugars and sliding scale use. 100 strip 11    SUMAtriptan (IMITREX) 5 MG/ACT nasal spray 1 spray by Nasal route daily as needed for Migraine May repeat x 1 dose in 2 hours, if headache persists. 1 each 5    albuterol sulfate HFA (VENTOLIN HFA) 108 (90 Base) MCG/ACT inhaler Inhale 1-2 puffs into the lungs every 6 hours as needed for Wheezing or Shortness of Breath 1 Inhaler 5    EPINEPHrine (EPIPEN 2-KARY) 0.3 MG/0.3ML SOAJ injection Use as directed for allergic reaction 1 each 0    Blood Glucose Monitoring Suppl JARED 1 kit by Does not apply route daily Indications: Diabetes, Type 1 1 Device 0    Lancets MISC Indications: Diabetes Patient is testing 4 times daily due to fluctuating blood sugars and sliding scale use. 100 each 11     No current facility-administered medications for this visit. Review of Systems   Constitutional: Positive for activity change. Negative for appetite change and fever. Respiratory: Negative for cough, shortness of breath and wheezing. Cardiovascular: Negative for chest pain and palpitations. Neurological:        Numbness and burning in his feet. Bilateral feet pain       Prior to Visit Medications    Medication Sig Taking?  Authorizing Provider   DULoxetine (CYMBALTA) 30 MG extended release capsule take 1 capsule by mouth once daily  Laquita Martines,    ondansetron (ZOFRAN-ODT) 4 MG disintegrating tablet Take 1 tablet by mouth every 8 hours as needed for Nausea or Vomiting Tomasa Mejia, APRN - CNP   gabapentin (NEURONTIN) 300 MG capsule Take 2 capsules by mouth two times a day in AM & PM.  Take 1 capsule in afternoon WITH THE 100MG DOSE. Penelope Never Cain Combe Black, DO   gabapentin (NEURONTIN) 100 MG capsule Take 1 capsule by mouth in afternoon WITH  MG CAPSULE. Penelope Never Cain Combe Black, DO   simvastatin (ZOCOR) 20 MG tablet Take 1 tablet by mouth every evening  Cain Nicole Martines, DO   lisinopril (PRINIVIL;ZESTRIL) 2.5 MG tablet Take 1 tablet by mouth daily  Cain Comblucho Martines, DO   insulin lispro (HUMALOG KWIKPEN) 100 UNIT/ML pen Inject 27 Units into the skin 3 times daily (before meals) Adjust and increase dose based on sliding scale. Cain Nicole Martines, DO   insulin detemir (LEVEMIR FLEXTOUCH) 100 UNIT/ML injection pen Inject 30 Units into the skin daily At noon  Oneida Guevara DO   Insulin Pen Needle (PEN NEEDLES 3/16\") 31G X 5 MM MISC Use with insulin pens 4x's daily as directed  Oneida Guevara DO   blood glucose monitor strips Patient is testing 4 times daily due to fluctuating blood sugars and sliding scale use. Cain Comblucho Martines, DO   SUMAtriptan (IMITREX) 5 MG/ACT nasal spray 1 spray by Nasal route daily as needed for Migraine May repeat x 1 dose in 2 hours, if headache persists. Cain Comblucho Martines, DO   albuterol sulfate HFA (VENTOLIN HFA) 108 (90 Base) MCG/ACT inhaler Inhale 1-2 puffs into the lungs every 6 hours as needed for Wheezing or Shortness of Breath  Cain Comblucho Martines, DO   EPINEPHrine (EPIPEN 2-KARY) 0.3 MG/0.3ML SOAJ injection Use as directed for allergic reaction  Cain Comblucho Martines DO   Blood Glucose Monitoring Suppl JARED 1 kit by Does not apply route daily Indications: Diabetes, Type 1  Cain Comblucho Martines, DO   Lancets MISC Indications: Diabetes Patient is testing 4 times daily due to fluctuating blood sugars and sliding scale use.   Oneida Guevara DO        Social History     Tobacco Use    Smoking status: Current Some Day Smoker     Packs/day: 0.20     Types: Cigarettes    Smokeless tobacco: Never Used    Tobacco comment: ECLAMB RRT 7/19/17   Substance Use Topics    Alcohol use: Yes     Alcohol/week: 10.8 oz     Types: 18 Cans of beer per week     Comment: has 18 beers in a weekend        Vitals:    04/11/19 1029   BP: 130/84   Site: Right Upper Arm   Position: Sitting   Cuff Size: Large Adult   Pulse: 110   SpO2: 99%   Weight: 180 lb (81.6 kg)   Height: 5' 10\" (1.778 m)     Estimated body mass index is 25.83 kg/m² as calculated from the following:    Height as of this encounter: 5' 10\" (1.778 m). Weight as of this encounter: 180 lb (81.6 kg). Physical Exam   Constitutional: He is oriented to person, place, and time. He appears well-developed and well-nourished. No distress. HENT:   Head: Normocephalic and atraumatic. Cardiovascular: Normal rate, regular rhythm and normal heart sounds. Pulmonary/Chest: Effort normal and breath sounds normal.   Neurological: He is alert and oriented to person, place, and time. Nursing note and vitals reviewed. ASSESSMENT/PLAN:  1. Type 1 diabetes mellitus with diabetic neuropathy [facility])  Discussed with patient that the best way to help the pain is to keep blood sugars under control. He is to continue to follow up with endocrinology. He is to discuss with Dr. Joce Mercado further titration of gabapentin. Offered him a referral to neurology or to pain management pt would like to wait on the referrals   Note provided to return to work with no restrictions. Pt to return as scheduled sooner if needed. No follow-ups on file. An electronic signature was used to authenticate this note.     --BEATRIS Shipman - CNP on 4/11/2019 at 1:56 PM

## 2019-04-19 DIAGNOSIS — N52.9 ERECTILE DYSFUNCTION, UNSPECIFIED ERECTILE DYSFUNCTION TYPE: ICD-10-CM

## 2019-04-19 DIAGNOSIS — G43.909 MIGRAINE WITHOUT STATUS MIGRAINOSUS, NOT INTRACTABLE, UNSPECIFIED MIGRAINE TYPE: ICD-10-CM

## 2019-04-19 DIAGNOSIS — E87.6 HYPOKALEMIA: ICD-10-CM

## 2019-04-19 LAB
AVERAGE GLUCOSE: NORMAL
HBA1C MFR BLD: 9 %

## 2019-04-19 RX ORDER — POTASSIUM CHLORIDE 750 MG/1
10 CAPSULE, EXTENDED RELEASE ORAL 2 TIMES DAILY
COMMUNITY
End: 2020-03-27 | Stop reason: ALTCHOICE

## 2019-04-29 ENCOUNTER — OFFICE VISIT (OUTPATIENT)
Dept: FAMILY MEDICINE CLINIC | Age: 34
End: 2019-04-29
Payer: COMMERCIAL

## 2019-04-29 VITALS
TEMPERATURE: 97.4 F | HEART RATE: 104 BPM | BODY MASS INDEX: 25.05 KG/M2 | WEIGHT: 175 LBS | RESPIRATION RATE: 16 BRPM | HEIGHT: 70 IN | SYSTOLIC BLOOD PRESSURE: 108 MMHG | DIASTOLIC BLOOD PRESSURE: 74 MMHG | OXYGEN SATURATION: 99 %

## 2019-04-29 DIAGNOSIS — E10.42 DIABETIC POLYNEUROPATHY ASSOCIATED WITH TYPE 1 DIABETES MELLITUS (HCC): Primary | ICD-10-CM

## 2019-04-29 DIAGNOSIS — L02.91 ABSCESS: ICD-10-CM

## 2019-04-29 DIAGNOSIS — E10.40 TYPE 1 DIABETES MELLITUS WITH DIABETIC NEUROPATHY (HCC): ICD-10-CM

## 2019-04-29 PROCEDURE — 99213 OFFICE O/P EST LOW 20 MIN: CPT | Performed by: NURSE PRACTITIONER

## 2019-04-29 PROCEDURE — 4004F PT TOBACCO SCREEN RCVD TLK: CPT | Performed by: NURSE PRACTITIONER

## 2019-04-29 PROCEDURE — G8419 CALC BMI OUT NRM PARAM NOF/U: HCPCS | Performed by: NURSE PRACTITIONER

## 2019-04-29 PROCEDURE — G8427 DOCREV CUR MEDS BY ELIG CLIN: HCPCS | Performed by: NURSE PRACTITIONER

## 2019-04-29 PROCEDURE — 2022F DILAT RTA XM EVC RTNOPTHY: CPT | Performed by: NURSE PRACTITIONER

## 2019-04-29 PROCEDURE — 3046F HEMOGLOBIN A1C LEVEL >9.0%: CPT | Performed by: NURSE PRACTITIONER

## 2019-04-29 RX ORDER — DOXYCYCLINE 100 MG/1
100 TABLET ORAL 2 TIMES DAILY
Qty: 20 TABLET | Refills: 0 | Status: SHIPPED | OUTPATIENT
Start: 2019-04-29 | End: 2019-05-09

## 2019-04-29 RX ORDER — DULOXETIN HYDROCHLORIDE 60 MG/1
60 CAPSULE, DELAYED RELEASE ORAL DAILY
Qty: 90 CAPSULE | Refills: 1 | Status: SHIPPED | OUTPATIENT
Start: 2019-04-29 | End: 2019-12-18

## 2019-04-29 ASSESSMENT — ENCOUNTER SYMPTOMS
DIARRHEA: 0
NAUSEA: 1
ABDOMINAL PAIN: 1
CONSTIPATION: 0
BACK PAIN: 1

## 2019-04-29 NOTE — PROGRESS NOTES
release capsule Take 10 mEq by mouth 2 times daily      insulin lispro (HUMALOG KWIKPEN) 100 UNIT/ML pen Inject 27 Units into the skin 3 times daily (before meals) Adjust and increase dose based on sliding scale. 10 pen 0    insulin detemir (LEVEMIR FLEXTOUCH) 100 UNIT/ML injection pen Inject 30 Units into the skin daily At noon 5 pen 1    Lancets MISC Indications: Diabetes Patient is testing 4 times daily due to fluctuating blood sugars and sliding scale use. 100 each 11     No current facility-administered medications for this visit. Review of Systems   Constitutional: Negative. Negative for fatigue. Gastrointestinal: Positive for abdominal pain (right side port site) and nausea. Negative for constipation and diarrhea. Genitourinary: Negative. Musculoskeletal: Positive for back pain and myalgias (bilat legs from thighs down). Skin: Negative. Objective:       /74 (Site: Right Upper Arm, Position: Sitting, Cuff Size: Medium Adult)   Pulse 104   Temp 97.4 °F (36.3 °C) (Tympanic)   Resp 16   Ht 5' 10\" (1.778 m)   Wt 175 lb (79.4 kg)   SpO2 99%   BMI 25.11 kg/m²     Physical Exam   Constitutional: He is oriented to person, place, and time. Vital signs are normal. He appears well-developed and well-nourished. He is active and cooperative. HENT:   Head: Normocephalic and atraumatic. Nose: Nose normal.   Eyes: Pupils are equal, round, and reactive to light. EOM are normal.   Neck: Normal range of motion. Cardiovascular: Normal rate and regular rhythm. Pulmonary/Chest: Effort normal and breath sounds normal.   Abdominal: Soft. Bowel sounds are normal.       Tender firm area noted. No redness or drainage or warmth noted. Musculoskeletal: Normal range of motion. Neurological: He is alert and oriented to person, place, and time. Skin: Skin is warm and dry. Psychiatric: He has a normal mood and affect.  His behavior is normal. Judgment and thought content normal. Nursing note and vitals reviewed. Assessment & Plan:      1. Diabetic polyneuropathy associated with type 1 diabetes mellitus (HCC)-chronic    - DULoxetine (CYMBALTA) 60 MG extended release capsule; Take 1 capsule by mouth daily  Dispense: 90 capsule; Refill: 1    2. Type 1 diabetes mellitus with diabetic neuropathy (HCC)-chronic    - DULoxetine (CYMBALTA) 60 MG extended release capsule; Take 1 capsule by mouth daily  Dispense: 90 capsule; Refill: 1    3. Abscess-new  If the tenderness does not go away may need to come in for further testing.   - doxycycline monohydrate (ADOXA KARY 2/100) 100 MG tablet; Take 1 tablet by mouth 2 times daily for 10 days  Dispense: 20 tablet;  Refill: 0        BEATRIS Ludwig CNP   4/29/2019 2:14 PM

## 2019-04-29 NOTE — PATIENT INSTRUCTIONS
Patient Education        Learning About Benefits From Quitting Smoking  How does quitting smoking make you healthier? If you're thinking about quitting smoking, you may have a few reasons to be smoke-free. Your health may be one of them. · When you quit smoking, you lower your risks for cancer, lung disease, heart attack, stroke, blood vessel disease, and blindness from macular degeneration. · When you're smoke-free, you get sick less often, and you heal faster. You are less likely to get colds, flu, bronchitis, and pneumonia. · As a nonsmoker, you may find that your mood is better and you are less stressed. When and how will you feel healthier? Quitting has real health benefits that start from day 1 of being smoke-free. And the longer you stay smoke-free, the healthier you get and the better you feel. The first hours  · After just 20 minutes, your blood pressure and heart rate go down. That means there's less stress on your heart and blood vessels. · Within 12 hours, the level of carbon monoxide in your blood drops back to normal. That makes room for more oxygen. With more oxygen in your body, you may notice that you have more energy than when you smoked. After 2 weeks  · Your lungs start to work better. · Your risk of heart attack starts to drop. After 1 month  · When your lungs are clear, you cough less and breathe deeper, so it's easier to be active. · Your sense of taste and smell return. That means you can enjoy food more than you have since you started smoking. Over the years  · After 1 year, your risk of heart disease is half what it would be if you kept smoking. · After 5 years, your risk of stroke starts to shrink. Within a few years after that, it's about the same as if you'd never smoked. · After 10 years, your risk of dying from lung cancer is cut by about half. And your risk for many other types of cancer is lower too. How would quitting help others in your life?   When you quit smoking, you improve the health of everyone who now breathes in your smoke. · Their heart, lung, and cancer risks drop, much like yours. · They are sick less. For babies and small children, living smoke-free means they're less likely to have ear infections, pneumonia, and bronchitis. · If you're a woman who is or will be pregnant someday, quitting smoking means a healthier . · Children who are close to you are less likely to become adult smokers. Where can you learn more? Go to https://[employer]. org and sign in to your [employer] account. Enter [phone] in the [facility] box to learn more about \"Learning About Benefits From Quitting Smoking. \"     If you do not have an account, please click on the \"Sign Up Now\" link. Current as of: 2018  Content Version: 11.9  © 8508-8104 [employer]. Care instructions adapted under license by [facility] ([facility]). If you have questions about a medical condition or this instruction, always ask your healthcare professional. [address] any warranty or liability for your use of this information.

## 2019-05-10 ENCOUNTER — TELEPHONE (OUTPATIENT)
Dept: PAIN MANAGEMENT | Age: 34
End: 2019-05-10

## 2019-05-10 ENCOUNTER — OFFICE VISIT (OUTPATIENT)
Dept: PAIN MANAGEMENT | Age: 34
End: 2019-05-10
Payer: COMMERCIAL

## 2019-05-10 VITALS
WEIGHT: 179 LBS | HEIGHT: 70 IN | DIASTOLIC BLOOD PRESSURE: 64 MMHG | RESPIRATION RATE: 14 BRPM | BODY MASS INDEX: 25.62 KG/M2 | SYSTOLIC BLOOD PRESSURE: 110 MMHG

## 2019-05-10 DIAGNOSIS — E10.42 DIABETIC POLYNEUROPATHY ASSOCIATED WITH TYPE 1 DIABETES MELLITUS (HCC): Primary | ICD-10-CM

## 2019-05-10 DIAGNOSIS — E11.40 CHRONIC PAINFUL DIABETIC NEUROPATHY (HCC): ICD-10-CM

## 2019-05-10 PROCEDURE — 4004F PT TOBACCO SCREEN RCVD TLK: CPT | Performed by: NURSE PRACTITIONER

## 2019-05-10 PROCEDURE — 99213 OFFICE O/P EST LOW 20 MIN: CPT | Performed by: NURSE PRACTITIONER

## 2019-05-10 PROCEDURE — 3046F HEMOGLOBIN A1C LEVEL >9.0%: CPT | Performed by: NURSE PRACTITIONER

## 2019-05-10 PROCEDURE — G8428 CUR MEDS NOT DOCUMENT: HCPCS | Performed by: NURSE PRACTITIONER

## 2019-05-10 PROCEDURE — 2022F DILAT RTA XM EVC RTNOPTHY: CPT | Performed by: NURSE PRACTITIONER

## 2019-05-10 PROCEDURE — G8419 CALC BMI OUT NRM PARAM NOF/U: HCPCS | Performed by: NURSE PRACTITIONER

## 2019-05-10 RX ORDER — L-METHYLFOLATE-ALGAE-VIT B12-B6 CAP 3-90.314-2-35 MG 3-90.314-2-35 MG
1 CAP ORAL DAILY
Qty: 30 CAPSULE | Refills: 1 | Status: SHIPPED | OUTPATIENT
Start: 2019-05-10 | End: 2019-10-04

## 2019-05-10 RX ORDER — PREGABALIN 75 MG/1
CAPSULE ORAL
Qty: 60 CAPSULE | Refills: 3 | Status: SHIPPED | OUTPATIENT
Start: 2019-05-10 | End: 2019-06-28 | Stop reason: DRUGHIGH

## 2019-05-10 RX ORDER — LIDOCAINE 50 MG/G
OINTMENT TOPICAL
Qty: 3 TUBE | Refills: 2 | Status: SHIPPED | OUTPATIENT
Start: 2019-05-10 | End: 2019-05-10 | Stop reason: SDUPTHER

## 2019-05-10 RX ORDER — GABAPENTIN 800 MG/1
800 TABLET ORAL 3 TIMES DAILY PRN
Refills: 0 | COMMUNITY
Start: 2019-04-30 | End: 2019-06-28 | Stop reason: DRUGHIGH

## 2019-05-10 RX ORDER — LIDOCAINE 50 MG/G
OINTMENT TOPICAL
Qty: 3 TUBE | Refills: 2 | Status: SHIPPED | OUTPATIENT
Start: 2019-05-10 | End: 2020-12-18

## 2019-05-10 ASSESSMENT — ENCOUNTER SYMPTOMS: SHORTNESS OF BREATH: 0

## 2019-05-10 NOTE — PROGRESS NOTES
Subjective:      Patient ID: Dola Goodpasture is a 29 y.o. male. Chief Complaint   Patient presents with    Peripheral Neuropathy     bilateral    Foot Pain    New Patient     ref by Dr Nancy Bond - endocrinologist     HPI Initial new patient consult, neuropathic pain BLE from diabetic neuropathy. Gabapentin 2400mg per day cymbalta recently increased one week ago 60mg daily. trialed TCA. Motrin/ibuprofen prn. ER visit, Norco with short lasting relief    Pain Assessment  Location of Pain: Foot  Location Modifiers: Left, Right  Severity of Pain: 6  Quality of Pain: (sharp stabbing)  Duration of Pain: Persistent  Frequency of Pain: Constant  Aggravating Factors: Bending, Stretching, Straightening, Exercise, Standing, Squatting, Kneeling, Walking, Stairs  Limiting Behavior: Yes  Relieving Factors: Rest  Result of Injury: No  Work-Related Injury: No  Are there other pain locations you wish to document?: No    Allergies   Allergen Reactions    Bee Venom Hives     With swelling    Lipitor [Atorvastatin] Hives       Outpatient Medications Marked as Taking for the 5/10/19 encounter (Office Visit) with BEATRIS Read - CNP   Medication Sig Dispense Refill    gabapentin (NEURONTIN) 800 MG tablet Take 800 mg by mouth 3 times daily as needed. 0    Insulin Pump - Insulin regular Inject into the skin continuous Running with humalog in the pump - works with calorie count      pregabalin (LYRICA) 75 MG capsule Bid for one week, if tolerates well then increase tid 60 capsule 3    Buprenorphine HCl (BELBUCA) 75 MCG FILM Place 1 Film inside cheek every 12 hours for 30 days. 60 each 0    L-methylfolate-B6-B12 (METANX) 7-03.920-8-65 MG CAPS capsule Take 1 capsule by mouth daily 30 capsule 1    lidocaine (XYLOCAINE) 5 % ointment Apply topically as needed tid.  3 Tube 2    DULoxetine (CYMBALTA) 60 MG extended release capsule Take 1 capsule by mouth daily 90 capsule 1    potassium chloride (MICRO-K) 10 MEQ extended release capsule Take 10 mEq by mouth 2 times daily      simvastatin (ZOCOR) 20 MG tablet Take 1 tablet by mouth every evening 30 tablet 5    blood glucose monitor strips Patient is testing 4 times daily due to fluctuating blood sugars and sliding scale use. 100 strip 11    SUMAtriptan (IMITREX) 5 MG/ACT nasal spray 1 spray by Nasal route daily as needed for Migraine May repeat x 1 dose in 2 hours, if headache persists. 1 each 5    albuterol sulfate HFA (VENTOLIN HFA) 108 (90 Base) MCG/ACT inhaler Inhale 1-2 puffs into the lungs every 6 hours as needed for Wheezing or Shortness of Breath 1 Inhaler 5    EPINEPHrine (EPIPEN 2-KARY) 0.3 MG/0.3ML SOAJ injection Use as directed for allergic reaction 1 each 0    Blood Glucose Monitoring Suppl JARED 1 kit by Does not apply route daily Indications: Diabetes, Type 1 1 Device 0    Lancets MISC Indications: Diabetes Patient is testing 4 times daily due to fluctuating blood sugars and sliding scale use.  100 each 11       Past Medical History:   Diagnosis Date    Asthma     Diabetes mellitus ([facility] [facility]     type 1    Diabetic ketoacidosis associated with type 1 diabetes mellitus ([facility] [facility] 04/28/2017    Head injury     Hyperlipidemia     Hypertension     Hypokalemia     Male erectile dysfunction     Migraine     Migraine     Neuropathy in diabetes [facility])        Past Surgical History:   Procedure Laterality Date    APPENDECTOMY      TYMPANOSTOMY TUBE PLACEMENT         Family History   Problem Relation Age of Onset    Diabetes Mother         type 1    Heart Disease Mother     Kidney Disease Mother     High Cholesterol Mother     High Blood Pressure Mother     High Blood Pressure Father     Diabetes Father         type 2    Heart Disease Father        Social History     Socioeconomic History    Marital status:      Spouse name: None    Number of children: None    Years of education: None    Highest education level: None   Occupational History    None Social Needs    Financial resource strain: None    Food insecurity:     Worry: None     Inability: None    Transportation needs:     Medical: None     Non-medical: None   Tobacco Use    Smoking status: Current Some Day Smoker     Packs/day: 0.20     Types: Cigarettes    Smokeless tobacco: Never Used    Tobacco comment: ECLAMB RRT 7/19/17   Substance and Sexual Activity    Alcohol use: Yes     Alcohol/week: 10.8 oz     Types: 18 Cans of beer per week     Comment: has 18 beers in a weekend    Drug use: Never    Sexual activity: Yes   Lifestyle    Physical activity:     Days per week: None     Minutes per session: None    Stress: None   Relationships    Social connections:     Talks on phone: None     Gets together: None     Attends Pent[employer]al service: None     Active member of club or organization: None     Attends meetings of clubs or organizations: None     Relationship status: None    Intimate partner violence:     Fear of current or ex partner: None     Emotionally abused: None     Physically abused: None     Forced sexual activity: None   Other Topics Concern    None   Social History Narrative    ** Merged History Encounter **          Review of Systems   Constitutional: Negative for activity change and fatigue. Respiratory: Negative for shortness of breath. Cardiovascular: Negative for chest pain. Endocrine:        Temperature changes BLE   Genitourinary: Negative for difficulty urinating. Neurological:        Neuropathic pain   Psychiatric/Behavioral: Positive for sleep disturbance. Objective:   Physical Exam   Constitutional: He is oriented to person, place, and time. Vital signs are normal. He appears well-developed and well-nourished. He is active and cooperative. Non-toxic appearance. He does not have a sickly appearance. He does not appear ill. No distress. He is not intubated. HENT:   Head: Normocephalic and atraumatic.    Pulmonary/Chest: Effort normal. No accessory muscle usage. No apnea, no tachypnea and no bradypnea. He is not intubated. No respiratory distress. Neurological: He is alert and oriented to person, place, and time. No cranial nerve deficit. Skin: Skin is warm, dry and intact. Capillary refill takes less than 2 seconds. He is not diaphoretic. No cyanosis. No pallor. Nails show no clubbing. Psychiatric: He has a normal mood and affect. His speech is normal and behavior is normal. Judgment normal. He is not agitated, not aggressive, not withdrawn, not actively hallucinating and not combative. He does not express impulsivity or inappropriate judgment. He is attentive. Vitals reviewed. Assessment:      1. Diabetic polyneuropathy associated with type 1 diabetes mellitus ([address]    2. Chronic painful diabetic neuropathy (HCC)          Plan:      Start Lyrica 75mg bid, will increase gradually and wean gabapentin. Add lidocaine prn   Metanx daily  Start belbuca 75mcq bid  Follow up one month      Controlled Substances Monitoring:     RX Monitoring 5/10/2019   Attestation The Prescription Monitoring Report for this patient was reviewed today.    Chronic Pain Routine Monitoring No signs of potential drug abuse or diversion identified: otherwise, see note documentation       Yumiko Vázquez, BEATRIS - CNP

## 2019-05-10 NOTE — TELEPHONE ENCOUNTER
Received a order clarification from pharmacy for patient's Lidocaine 5%. They need to know how many times per day ointment can be used per day.   Please send in new script to pharmacy with new directions

## 2019-05-13 NOTE — TELEPHONE ENCOUNTER
Cover  [employer] requesting more information, this has been submitted and we will await a response from insurance company

## 2019-05-13 NOTE — TELEPHONE ENCOUNTER
Pt called ara about PA. Writer informed pt it has been processed and is pending with insurance right now and he will get a call when it is completed.

## 2019-05-14 NOTE — TELEPHONE ENCOUNTER
Kelvin Gonzalez LPN called and notified the patient. The patient will contact the pharmacy to fill the medication.

## 2019-05-15 RX ORDER — LIDOCAINE 50 MG/G
OINTMENT TOPICAL
Qty: 5 TUBE | Refills: 5 | Status: SHIPPED | OUTPATIENT
Start: 2019-05-15 | End: 2019-06-28 | Stop reason: SDUPTHER

## 2019-05-21 NOTE — TELEPHONE ENCOUNTER
Can you please clarify instructions for pharmacy.   They need to know how many times to use each day

## 2019-06-28 ENCOUNTER — OFFICE VISIT (OUTPATIENT)
Dept: PAIN MANAGEMENT | Age: 34
End: 2019-06-28
Payer: COMMERCIAL

## 2019-06-28 VITALS
DIASTOLIC BLOOD PRESSURE: 86 MMHG | HEIGHT: 70 IN | WEIGHT: 161 LBS | HEART RATE: 88 BPM | BODY MASS INDEX: 23.05 KG/M2 | SYSTOLIC BLOOD PRESSURE: 130 MMHG

## 2019-06-28 DIAGNOSIS — E11.40 CHRONIC PAINFUL DIABETIC NEUROPATHY (HCC): ICD-10-CM

## 2019-06-28 DIAGNOSIS — E10.42 DIABETIC POLYNEUROPATHY ASSOCIATED WITH TYPE 1 DIABETES MELLITUS (HCC): Primary | ICD-10-CM

## 2019-06-28 PROCEDURE — 4004F PT TOBACCO SCREEN RCVD TLK: CPT | Performed by: NURSE PRACTITIONER

## 2019-06-28 PROCEDURE — 3046F HEMOGLOBIN A1C LEVEL >9.0%: CPT | Performed by: NURSE PRACTITIONER

## 2019-06-28 PROCEDURE — G8420 CALC BMI NORM PARAMETERS: HCPCS | Performed by: NURSE PRACTITIONER

## 2019-06-28 PROCEDURE — G8427 DOCREV CUR MEDS BY ELIG CLIN: HCPCS | Performed by: NURSE PRACTITIONER

## 2019-06-28 PROCEDURE — 2022F DILAT RTA XM EVC RTNOPTHY: CPT | Performed by: NURSE PRACTITIONER

## 2019-06-28 PROCEDURE — 99214 OFFICE O/P EST MOD 30 MIN: CPT | Performed by: NURSE PRACTITIONER

## 2019-06-28 RX ORDER — GABAPENTIN 300 MG/1
CAPSULE ORAL
Qty: 50 CAPSULE | Refills: 0 | Status: SHIPPED | OUTPATIENT
Start: 2019-06-28 | End: 2019-10-04

## 2019-06-28 RX ORDER — PREGABALIN 150 MG/1
150 CAPSULE ORAL 2 TIMES DAILY
Qty: 60 CAPSULE | Refills: 3 | Status: SHIPPED | OUTPATIENT
Start: 2019-06-28 | End: 2019-11-15

## 2019-06-28 ASSESSMENT — ENCOUNTER SYMPTOMS: SHORTNESS OF BREATH: 0

## 2019-06-28 NOTE — PROGRESS NOTES
Subjective:      Patient ID: Mckenzie Baron is a 29 y.o. male. Chief Complaint   Patient presents with    Leg Pain     neuropathy     Leg Pain      Returns after initial new patient consult. Started on Belbuca and Lyrica for neuropathic pain. He does noted additional pain relief, Belbuca not lasting 12 hours, wears off in 5 hours. No side effects from Lyrica    Pain Assessment  Location of Pain: Leg  Location Modifiers: Left, Right  Severity of Pain: 7  Quality of Pain: Throbbing  Duration of Pain: Persistent  Frequency of Pain: Constant  Aggravating Factors: Standing  Limiting Behavior: Some  Relieving Factors: Rest  Are there other pain locations you wish to document?: No    Allergies   Allergen Reactions    Bee Venom Hives     With swelling    Lipitor [Atorvastatin] Hives       Outpatient Medications Marked as Taking for the 6/28/19 encounter (Office Visit) with BEATRIS Thomas CNP   Medication Sig Dispense Refill    pregabalin (LYRICA) 150 MG capsule Take 1 capsule by mouth 2 times daily for 30 days. 60 capsule 3    Buprenorphine HCl (BELBUCA) 150 MCG FILM Place 1 Film inside cheek every 12 hours for 30 days. 60 each 1    gabapentin (NEURONTIN) 300 MG capsule 4x's daily for 5 days, then 3x's daily for 5 days, twice daily for 5 days, then once daily for 5 days until gone 50 capsule 0    Insulin Pump - Insulin regular Inject into the skin continuous Running with humalog in the pump - works with calorie count      L-methylfolate-B6-B12 (METANX) 6-05.654-8-87 MG CAPS capsule Take 1 capsule by mouth daily 30 capsule 1    lidocaine (XYLOCAINE) 5 % ointment Apply topically as needed tid.  3 Tube 2    DULoxetine (CYMBALTA) 60 MG extended release capsule Take 1 capsule by mouth daily 90 capsule 1    potassium chloride (MICRO-K) 10 MEQ extended release capsule Take 10 mEq by mouth 2 times daily      simvastatin (ZOCOR) 20 MG tablet Take 1 tablet by mouth every evening 30 tablet 5    lisinopril (PRINIVIL;ZESTRIL) 2.5 MG tablet Take 1 tablet by mouth daily 30 tablet 11    blood glucose monitor strips Patient is testing 4 times daily due to fluctuating blood sugars and sliding scale use. 100 strip 11    SUMAtriptan (IMITREX) 5 MG/ACT nasal spray 1 spray by Nasal route daily as needed for Migraine May repeat x 1 dose in 2 hours, if headache persists. 1 each 5    albuterol sulfate HFA (VENTOLIN HFA) 108 (90 Base) MCG/ACT inhaler Inhale 1-2 puffs into the lungs every 6 hours as needed for Wheezing or Shortness of Breath 1 Inhaler 5    EPINEPHrine (EPIPEN 2-KARY) 0.3 MG/0.3ML SOAJ injection Use as directed for allergic reaction 1 each 0    Blood Glucose Monitoring Suppl JARED 1 kit by Does not apply route daily Indications: Diabetes, Type 1 1 Device 0    Lancets MISC Indications: Diabetes Patient is testing 4 times daily due to fluctuating blood sugars and sliding scale use.  100 each 11       Past Medical History:   Diagnosis Date    Asthma     Diabetes mellitus ([facility] [facility]     type 1    Diabetic ketoacidosis associated with type 1 diabetes mellitus ([facility] [address] 04/28/2017    Head injury     Hyperlipidemia     Hypertension     Hypokalemia     Male erectile dysfunction     Migraine     Migraine     Neuropathy in diabetes [facility])        Past Surgical History:   Procedure Laterality Date    APPENDECTOMY      TYMPANOSTOMY TUBE PLACEMENT         Family History   Problem Relation Age of Onset    Diabetes Mother         type 1    Heart Disease Mother     Kidney Disease Mother     High Cholesterol Mother     High Blood Pressure Mother     High Blood Pressure Father     Diabetes Father         type 2    Heart Disease Father        Social History     Socioeconomic History    Marital status:      Spouse name: None    Number of children: None    Years of education: None    Highest education level: None   Occupational History    None   Social Needs    Financial resource strain: None   [place] intubated. No respiratory distress. Neurological: He is alert and oriented to person, place, and time. No cranial nerve deficit. Skin: Skin is warm, dry and intact. Capillary refill takes less than 2 seconds. He is not diaphoretic. No cyanosis. No pallor. Nails show no clubbing. Psychiatric: He has a normal mood and affect. His speech is normal and behavior is normal. Judgment normal. He is not agitated, not aggressive, not withdrawn, not actively hallucinating and not combative. He does not express impulsivity or inappropriate judgment. He is attentive. Vitals reviewed. Assessment:      1. Diabetic polyneuropathy associated with type 1 diabetes mellitus ([facility] [address]    2. Chronic painful diabetic neuropathy ([facility] [address]          Plan:    Wean gabapentin  Increase Lyrica 150mg bid  Increase Belubca 150mcq bid  Follow up one month      Controlled Substance Monitoring:    Acute and Chronic Pain Monitoring:   RX Monitoring 6/28/2019   Attestation -   Periodic Controlled Substance Monitoring No signs of potential drug abuse or diversion identified.            BEATRIS Voss - CNP

## 2019-07-08 RX ORDER — L-METHYLFOLATE-ALGAE-VIT B12-B6 CAP 3-90.314-2-35 MG 3-90.314-2-35 MG
CAP ORAL DAILY
Qty: 60 CAPSULE | OUTPATIENT
Start: 2019-07-08

## 2019-07-08 NOTE — PROGRESS NOTES
The medication has been pended in the patients encounter to be sent to [address] [place] [address].

## 2019-08-30 ENCOUNTER — TELEPHONE (OUTPATIENT)
Dept: PAIN MANAGEMENT | Age: 34
End: 2019-08-30

## 2019-09-05 NOTE — TELEPHONE ENCOUNTER
Writer called [employer] and initiated a PA over the phone because they stated that the covermymeds PA did not go through, it is currently with the nurse reviewers. Representatives name was Akhil Forbes   Phone # [phone]    The patient does not have and upcoming OV and has not had a drug screen or med contract. Writer LM for the patient to call the schedule an appt, when he does DS11 and PW needs to be included in his appt notes.

## 2019-09-06 LAB
AVERAGE GLUCOSE: NORMAL
HBA1C MFR BLD: 13.8 %

## 2019-10-04 ENCOUNTER — OFFICE VISIT (OUTPATIENT)
Dept: PAIN MANAGEMENT | Age: 34
End: 2019-10-04
Payer: COMMERCIAL

## 2019-10-04 ENCOUNTER — HOSPITAL ENCOUNTER (OUTPATIENT)
Dept: GENERAL RADIOLOGY | Age: 34
Discharge: HOME | End: 2019-10-06
Payer: COMMERCIAL

## 2019-10-04 ENCOUNTER — HOSPITAL ENCOUNTER (OUTPATIENT)
Age: 34
Setting detail: SPECIMEN
Discharge: HOME | End: 2019-10-04
Payer: COMMERCIAL

## 2019-10-04 VITALS
SYSTOLIC BLOOD PRESSURE: 110 MMHG | WEIGHT: 160.94 LBS | RESPIRATION RATE: 16 BRPM | BODY MASS INDEX: 23.04 KG/M2 | HEIGHT: 70 IN | HEART RATE: 80 BPM | DIASTOLIC BLOOD PRESSURE: 80 MMHG

## 2019-10-04 DIAGNOSIS — Z02.83 ENCOUNTER FOR DRUG SCREENING: ICD-10-CM

## 2019-10-04 DIAGNOSIS — Z79.891 ENCOUNTER FOR LONG-TERM OPIATE ANALGESIC USE: ICD-10-CM

## 2019-10-04 DIAGNOSIS — G89.29 CHRONIC MIDLINE THORACIC BACK PAIN: ICD-10-CM

## 2019-10-04 DIAGNOSIS — M54.6 CHRONIC MIDLINE THORACIC BACK PAIN: ICD-10-CM

## 2019-10-04 DIAGNOSIS — E11.40 CHRONIC PAINFUL DIABETIC NEUROPATHY (HCC): Primary | ICD-10-CM

## 2019-10-04 PROCEDURE — 72072 X-RAY EXAM THORAC SPINE 3VWS: CPT

## 2019-10-04 PROCEDURE — G8484 FLU IMMUNIZE NO ADMIN: HCPCS | Performed by: NURSE PRACTITIONER

## 2019-10-04 PROCEDURE — G8427 DOCREV CUR MEDS BY ELIG CLIN: HCPCS | Performed by: NURSE PRACTITIONER

## 2019-10-04 PROCEDURE — 80307 DRUG TEST PRSMV CHEM ANLYZR: CPT

## 2019-10-04 PROCEDURE — 2022F DILAT RTA XM EVC RTNOPTHY: CPT | Performed by: NURSE PRACTITIONER

## 2019-10-04 PROCEDURE — G8420 CALC BMI NORM PARAMETERS: HCPCS | Performed by: NURSE PRACTITIONER

## 2019-10-04 PROCEDURE — 99214 OFFICE O/P EST MOD 30 MIN: CPT | Performed by: NURSE PRACTITIONER

## 2019-10-04 PROCEDURE — 3046F HEMOGLOBIN A1C LEVEL >9.0%: CPT | Performed by: NURSE PRACTITIONER

## 2019-10-04 PROCEDURE — 4004F PT TOBACCO SCREEN RCVD TLK: CPT | Performed by: NURSE PRACTITIONER

## 2019-10-04 ASSESSMENT — ENCOUNTER SYMPTOMS: SHORTNESS OF BREATH: 0

## 2019-10-07 LAB

## 2019-11-15 ENCOUNTER — OFFICE VISIT (OUTPATIENT)
Dept: PAIN MANAGEMENT | Age: 34
End: 2019-11-15
Payer: COMMERCIAL

## 2019-11-15 VITALS
HEIGHT: 70 IN | DIASTOLIC BLOOD PRESSURE: 82 MMHG | WEIGHT: 160.94 LBS | RESPIRATION RATE: 16 BRPM | SYSTOLIC BLOOD PRESSURE: 130 MMHG | BODY MASS INDEX: 23.04 KG/M2 | HEART RATE: 68 BPM

## 2019-11-15 DIAGNOSIS — E11.40 CHRONIC PAINFUL DIABETIC NEUROPATHY (HCC): ICD-10-CM

## 2019-11-15 DIAGNOSIS — E10.42 DIABETIC POLYNEUROPATHY ASSOCIATED WITH TYPE 1 DIABETES MELLITUS (HCC): ICD-10-CM

## 2019-11-15 PROCEDURE — 99214 OFFICE O/P EST MOD 30 MIN: CPT | Performed by: NURSE PRACTITIONER

## 2019-11-15 PROCEDURE — G8427 DOCREV CUR MEDS BY ELIG CLIN: HCPCS | Performed by: NURSE PRACTITIONER

## 2019-11-15 PROCEDURE — 2022F DILAT RTA XM EVC RTNOPTHY: CPT | Performed by: NURSE PRACTITIONER

## 2019-11-15 PROCEDURE — 3046F HEMOGLOBIN A1C LEVEL >9.0%: CPT | Performed by: NURSE PRACTITIONER

## 2019-11-15 PROCEDURE — 4004F PT TOBACCO SCREEN RCVD TLK: CPT | Performed by: NURSE PRACTITIONER

## 2019-11-15 PROCEDURE — G8484 FLU IMMUNIZE NO ADMIN: HCPCS | Performed by: NURSE PRACTITIONER

## 2019-11-15 PROCEDURE — G8420 CALC BMI NORM PARAMETERS: HCPCS | Performed by: NURSE PRACTITIONER

## 2019-11-15 RX ORDER — PREGABALIN 150 MG/1
150 CAPSULE ORAL 2 TIMES DAILY
Qty: 60 CAPSULE | Refills: 3 | Status: SHIPPED | OUTPATIENT
Start: 2019-11-15 | End: 2020-03-27 | Stop reason: ALTCHOICE

## 2019-11-15 RX ORDER — L-METHYLFOLATE-ALGAE-VIT B12-B6 CAP 3-90.314-2-35 MG 3-90.314-2-35 MG
2 CAP ORAL DAILY
Qty: 60 CAPSULE | Refills: 5 | Status: SHIPPED | OUTPATIENT
Start: 2019-11-15 | End: 2020-03-27

## 2019-11-15 ASSESSMENT — ENCOUNTER SYMPTOMS: SHORTNESS OF BREATH: 0

## 2019-12-18 ENCOUNTER — OFFICE VISIT (OUTPATIENT)
Dept: PRIMARY CARE CLINIC | Age: 34
End: 2019-12-18
Payer: COMMERCIAL

## 2019-12-18 VITALS
RESPIRATION RATE: 16 BRPM | WEIGHT: 148.8 LBS | DIASTOLIC BLOOD PRESSURE: 74 MMHG | TEMPERATURE: 97.2 F | HEART RATE: 110 BPM | SYSTOLIC BLOOD PRESSURE: 122 MMHG | OXYGEN SATURATION: 99 % | BODY MASS INDEX: 21.35 KG/M2

## 2019-12-18 DIAGNOSIS — L02.214 ABSCESS, GROIN: Primary | ICD-10-CM

## 2019-12-18 PROCEDURE — G8484 FLU IMMUNIZE NO ADMIN: HCPCS | Performed by: NURSE PRACTITIONER

## 2019-12-18 PROCEDURE — 99213 OFFICE O/P EST LOW 20 MIN: CPT | Performed by: NURSE PRACTITIONER

## 2019-12-18 PROCEDURE — G8420 CALC BMI NORM PARAMETERS: HCPCS | Performed by: NURSE PRACTITIONER

## 2019-12-18 PROCEDURE — G8427 DOCREV CUR MEDS BY ELIG CLIN: HCPCS | Performed by: NURSE PRACTITIONER

## 2019-12-18 PROCEDURE — 4004F PT TOBACCO SCREEN RCVD TLK: CPT | Performed by: NURSE PRACTITIONER

## 2019-12-18 RX ORDER — DOXYCYCLINE HYCLATE 100 MG
100 TABLET ORAL 2 TIMES DAILY
Qty: 20 TABLET | Refills: 0 | Status: SHIPPED | OUTPATIENT
Start: 2019-12-18 | End: 2019-12-28

## 2019-12-18 ASSESSMENT — ENCOUNTER SYMPTOMS
RESPIRATORY NEGATIVE: 1
NAUSEA: 0
VOMITING: 0

## 2020-02-19 ENCOUNTER — OFFICE VISIT (OUTPATIENT)
Dept: FAMILY MEDICINE CLINIC | Age: 35
End: 2020-02-19
Payer: COMMERCIAL

## 2020-02-19 VITALS
SYSTOLIC BLOOD PRESSURE: 130 MMHG | WEIGHT: 147 LBS | HEART RATE: 105 BPM | BODY MASS INDEX: 21.09 KG/M2 | OXYGEN SATURATION: 98 % | DIASTOLIC BLOOD PRESSURE: 68 MMHG

## 2020-02-19 PROCEDURE — G8484 FLU IMMUNIZE NO ADMIN: HCPCS | Performed by: FAMILY MEDICINE

## 2020-02-19 PROCEDURE — 99213 OFFICE O/P EST LOW 20 MIN: CPT | Performed by: FAMILY MEDICINE

## 2020-02-19 PROCEDURE — 2022F DILAT RTA XM EVC RTNOPTHY: CPT | Performed by: FAMILY MEDICINE

## 2020-02-19 PROCEDURE — G8427 DOCREV CUR MEDS BY ELIG CLIN: HCPCS | Performed by: FAMILY MEDICINE

## 2020-02-19 PROCEDURE — 4004F PT TOBACCO SCREEN RCVD TLK: CPT | Performed by: FAMILY MEDICINE

## 2020-02-19 PROCEDURE — G8420 CALC BMI NORM PARAMETERS: HCPCS | Performed by: FAMILY MEDICINE

## 2020-02-19 PROCEDURE — 3046F HEMOGLOBIN A1C LEVEL >9.0%: CPT | Performed by: FAMILY MEDICINE

## 2020-02-19 NOTE — PROGRESS NOTES
HPI:  Patient comes in today for   Chief Complaint   Patient presents with    Eye Pain     right eye; was seen at [address]; scratched cornea   Patient here to f/u was seen in the ER 3 days ago for a cornal abrasion right eye is improved ,no drainage ,vision is ok . H/o DM type 1 blood sugars are a little high is transitioning to a new pump.sees endocrinology. HISTORY:  Past Medical History:   Diagnosis Date    Asthma     Diabetes mellitus ([facility] [address]     type 1    Diabetic ketoacidosis associated with type 1 diabetes mellitus ([facility] [address] 04/28/2017    Head injury     Hyperlipidemia     Hypertension     Hypokalemia     Male erectile dysfunction     Migraine     Migraine     Neuropathy in diabetes ([facility] [address]        Past Surgical History:   Procedure Laterality Date    APPENDECTOMY      TYMPANOSTOMY TUBE PLACEMENT          Family History   Problem Relation Age of Onset    Diabetes Mother         type 1    Heart Disease Mother     Kidney Disease Mother     High Cholesterol Mother     High Blood Pressure Mother     High Blood Pressure Father     Diabetes Father         type 2    Heart Disease Father        Social History     Socioeconomic History    Marital status:      Spouse name: Not on file    Number of children: Not on file    Years of education: Not on file    Highest education level: Not on file   Occupational History    Not on file   Social Needs    Financial resource strain: Not on file    Food insecurity:     Worry: Not on file     Inability: Not on file    Transportation needs:     Medical: Not on file     Non-medical: Not on file   Tobacco Use    Smoking status: Current Some Day Smoker     Packs/day: 0.20     Types: Cigarettes    Smokeless tobacco: Never Used    Tobacco comment: [facility] R[facility] 7/19/17   Substance and Sexual Activity    Alcohol use:  Yes     Alcohol/week: 18.0 standard drinks     Types: 18 Cans of beer per week     Comment: has 18 beers in a weekend    Drug use: Never Patient is testing 4 times daily due to fluctuating blood sugars and sliding scale use. 100 each 11    pregabalin (LYRICA) 150 MG capsule Take 1 capsule by mouth 2 times daily for 30 days. 60 capsule 3    L-methylfolate-B6-B12 (METANX) 5-75.262-6-81 MG CAPS capsule Take 2 capsules by mouth daily 60 capsule 5    lisinopril (PRINIVIL;ZESTRIL) 2.5 MG tablet Take 1 tablet by mouth daily 30 tablet 11     No current facility-administered medications for this visit. Allergies   Allergen Reactions    Bee Venom Hives     With swelling    Lipitor [Atorvastatin] Hives       REVIEW OF SYSTEMS:  General: No fevers, chills, change in weight  HEENT: No double vision, blurry vision, runny nose, sore throat, tinnitus  Cardio: No chest pain, palpitations, SIDDIQUI, edema, PND  Pulmonary: No cough, hemoptysis, SOB  GI: No nausea, vomiting, dysphagia, odynophagia, diarrhea, constipation. : No dysuria, hematuria, urgency, incontinence  Musculoskeletal: No muscle or joint aches, no joint swelling  Neuro: No dizziness/lightheadedness, no seizures  Endocrine: No polyuria, polydipsia, polyphagia, no temperature intolerancE      PHYSICAL EXAM:  VS:  /68   Pulse 105   Wt 147 lb (66.7 kg)   SpO2 98%   BMI 21.09 kg/m²   General:  Alert and oriented, NAD  HEENT:  TMs, BECKA, EOMI, Conjunctivae slightly red right eye. Throat currently clear. NECK:  Supple without adenopathy or thyromegaly, no carotid bruits  LUNGS:  CTA all fields  HEART:  RRR without M, R, or G  ABDOMEN:  Soft and nontender without palpable abnormalities  EXTREMITIES:  Without clubbing, cyanosis, or edema, no calf tenderness  SKIN:  warm to touch,normal texture. No active lesions. ASSESSMENT/PLAN:     Diagnosis Orders   1. Abrasion of right cornea, subsequent encounter     2. Type 1 diabetes mellitus with diabetic neuropathy (HCC)         No orders of the defined types were placed in this encounter.     Requested Prescriptions      No prescriptions requested or ordered in this encounter     The right eye was stained with fluorescin,no abrasion seen in cornea. Eye was irrigated. Continue drops for few more days. F/u with endocrinology for diabetes mangament  If still having problems after few days,will need ophthalmology referral.  Off work today. No follow-ups on file.     Electronically signed by Marykay Collet, MD

## 2020-03-27 ENCOUNTER — VIRTUAL VISIT (OUTPATIENT)
Dept: FAMILY MEDICINE CLINIC | Age: 35
End: 2020-03-27
Payer: COMMERCIAL

## 2020-03-27 PROCEDURE — 99214 OFFICE O/P EST MOD 30 MIN: CPT | Performed by: FAMILY MEDICINE

## 2020-03-27 PROCEDURE — 3046F HEMOGLOBIN A1C LEVEL >9.0%: CPT | Performed by: FAMILY MEDICINE

## 2020-03-27 PROCEDURE — 2022F DILAT RTA XM EVC RTNOPTHY: CPT | Performed by: FAMILY MEDICINE

## 2020-03-27 PROCEDURE — G8427 DOCREV CUR MEDS BY ELIG CLIN: HCPCS | Performed by: FAMILY MEDICINE

## 2020-03-27 RX ORDER — PEN NEEDLE, DIABETIC 30 GX3/16"
NEEDLE, DISPOSABLE MISCELLANEOUS
Status: CANCELLED | OUTPATIENT
Start: 2020-03-27

## 2020-03-27 ASSESSMENT — ENCOUNTER SYMPTOMS
COUGH: 0
BLOOD IN STOOL: 0
CONSTIPATION: 0
DIARRHEA: 0
SHORTNESS OF BREATH: 0

## 2020-03-30 RX ORDER — SIMVASTATIN 20 MG
20 TABLET ORAL EVERY EVENING
Qty: 30 TABLET | Refills: 5 | Status: CANCELLED | OUTPATIENT
Start: 2020-03-30

## 2020-03-30 RX ORDER — EPINEPHRINE 0.3 MG/.3ML
INJECTION SUBCUTANEOUS
Qty: 1 EACH | Refills: 0 | Status: CANCELLED | OUTPATIENT
Start: 2020-03-30

## 2020-03-30 RX ORDER — INSULIN LISPRO 100 [IU]/ML
INJECTION, SOLUTION INTRAVENOUS; SUBCUTANEOUS
Refills: 5 | Status: CANCELLED | OUTPATIENT
Start: 2020-03-30

## 2020-04-02 ENCOUNTER — TELEPHONE (OUTPATIENT)
Dept: FAMILY MEDICINE CLINIC | Age: 35
End: 2020-04-02

## 2020-04-02 RX ORDER — GLUCOSAMINE HCL/CHONDROITIN SU 500-400 MG
CAPSULE ORAL
Qty: 400 STRIP | Refills: 3 | Status: SHIPPED | OUTPATIENT
Start: 2020-04-02 | End: 2020-11-03

## 2020-04-02 RX ORDER — SIMVASTATIN 20 MG
20 TABLET ORAL EVERY EVENING
Qty: 30 TABLET | Refills: 5 | Status: SHIPPED | OUTPATIENT
Start: 2020-04-02 | End: 2021-03-02 | Stop reason: SDUPTHER

## 2020-04-02 RX ORDER — LANCETS 30 GAUGE
EACH MISCELLANEOUS
Qty: 100 EACH | Refills: 11 | Status: SHIPPED | OUTPATIENT
Start: 2020-04-02 | End: 2021-05-05 | Stop reason: ALTCHOICE

## 2020-04-02 RX ORDER — BLOOD SUGAR DIAGNOSTIC
STRIP MISCELLANEOUS
Qty: 90 EACH | Refills: 5 | Status: SHIPPED | OUTPATIENT
Start: 2020-04-02 | End: 2022-06-15 | Stop reason: ALTCHOICE

## 2020-04-02 RX ORDER — EPINEPHRINE 0.3 MG/.3ML
INJECTION SUBCUTANEOUS
Qty: 1 EACH | Refills: 0 | Status: SHIPPED | OUTPATIENT
Start: 2020-04-02 | End: 2021-03-02 | Stop reason: SDUPTHER

## 2020-04-02 RX ORDER — GABAPENTIN 100 MG/1
CAPSULE ORAL
Qty: 90 CAPSULE | Refills: 0 | Status: SHIPPED | OUTPATIENT
Start: 2020-04-02 | End: 2020-04-22 | Stop reason: DRUGHIGH

## 2020-04-02 RX ORDER — INSULIN LISPRO 100 [IU]/ML
INJECTION, SOLUTION INTRAVENOUS; SUBCUTANEOUS
Qty: 3 PEN | Refills: 1 | Status: SHIPPED | OUTPATIENT
Start: 2020-04-02 | End: 2020-07-22 | Stop reason: ALTCHOICE

## 2020-04-19 ASSESSMENT — ENCOUNTER SYMPTOMS
COUGH: 0
CONSTIPATION: 0
BLOOD IN STOOL: 0
SHORTNESS OF BREATH: 0
DIARRHEA: 0

## 2020-04-20 NOTE — PROGRESS NOTES
(ZOCOR) 20 MG tablet Take 1 tablet by mouth every evening Yes Gato Martines, DO   EPINEPHrine (EPIPEN 2-KARY) 0.3 MG/0.3ML SOAJ injection Use as directed for allergic reaction Yes Gato Martines, DO   Insulin Pen Needle (PEN NEEDLES) 32G X 5 MM MISC Use with insulin pen TID with meals. Yes Gato Martines, DO   Insulin Pump - Insulin regular Inject into the skin continuous Running with humalog in the pump - works with calorie count Yes Historical Provider, MD   lidocaine (XYLOCAINE) 5 % ointment Apply topically as needed tid. Yes Niurka Marcos APRN - CNP   Blood Glucose Monitoring Suppl JARED 1 kit by Does not apply route daily Indications: Diabetes, Type 1 Yes Gato Martines, DO   Lancets MISC Indications: Diabetes Patient is testing 4 times daily due to fluctuating blood sugars and sliding scale use. Gato Martines, DO   lisinopril (PRINIVIL;ZESTRIL) 2.5 MG tablet Take 1 tablet by mouth daily  Cheri Nagy DO       Social History     Tobacco Use    Smoking status: Current Some Day Smoker     Packs/day: 0.20     Types: Cigarettes    Smokeless tobacco: Never Used    Tobacco comment: [facility] R[facility] 7/19/17   Substance Use Topics    Alcohol use:  Yes     Alcohol/week: 18.0 standard drinks     Types: 18 Cans of beer per week     Comment: has 18 beers in a weekend    Drug use: Never        Allergies   Allergen Reactions    Bee Venom Hives     With swelling    Lipitor [Atorvastatin] Hives   ,   Past Medical History:   Diagnosis Date    Asthma     Diabetes mellitus ([facility] [address]     type 1    Diabetic ketoacidosis associated with type 1 diabetes mellitus ([facility] [address] 04/28/2017    Head injury     Hyperlipidemia     Hypertension     Male erectile dysfunction     Migraine     Neuropathy in diabetes ([facility] [address]    ,   Past Surgical History:   Procedure Laterality Date    APPENDECTOMY      TYMPANOSTOMY TUBE PLACEMENT         PHYSICAL EXAMINATION:  Vital Signs: None were able to be obtained by patient    Constitutional: [x] Appears
-       No follow-ups on file. No orders of the defined types were placed in this encounter. Orders Placed This Encounter   Medications    gabapentin (NEURONTIN) 100 MG capsule     Sig: Take 1 capsule by mouth TID x 1 week, then 2 capsules TID. Dispense:  90 capsule     Refill:  0    insulin lispro, 1 Unit Dial, (HUMALOG KWIKPEN) 100 UNIT/ML SOPN     Sig: Inject 8 units with meals, along with sliding scale as directed. Dispense:  3 pen     Refill:  1    blood glucose monitor strips     Sig: Indications: Diabetes Patient is testing 4 times daily due to fluctuating blood sugars and sliding scale use. Dispense:  400 strip     Refill:  3     Please fill per insurance coverage.  simvastatin (ZOCOR) 20 MG tablet     Sig: Take 1 tablet by mouth every evening     Dispense:  30 tablet     Refill:  5    EPINEPHrine (EPIPEN 2-KARY) 0.3 MG/0.3ML SOAJ injection     Sig: Use as directed for allergic reaction     Dispense:  1 each     Refill:  0    Insulin Pen Needle (PEN NEEDLES) 32G X 5 MM MISC     Sig: Use with insulin pen TID with meals. Dispense:  90 each     Refill:  5       Patient given educational materials - see patient instructions. Discussed use, benefit, and side effects of prescribed medications. All patient questions answered. Pt voiced understanding. Reviewed health maintenance.             Electronically signed by Ysabel Cabezas DO on 4/2/2020 at 3:33 AM

## 2020-05-21 RX ORDER — GABAPENTIN 300 MG/1
CAPSULE ORAL
Qty: 90 CAPSULE | Refills: 0 | Status: SHIPPED | OUTPATIENT
Start: 2020-05-22 | End: 2020-06-22

## 2020-06-22 RX ORDER — GABAPENTIN 300 MG/1
300 CAPSULE ORAL 3 TIMES DAILY
Qty: 90 CAPSULE | Refills: 2 | Status: SHIPPED | OUTPATIENT
Start: 2020-06-22 | End: 2020-10-03

## 2020-07-23 ENCOUNTER — TELEPHONE (OUTPATIENT)
Dept: ADMINISTRATIVE | Age: 35
End: 2020-07-23

## 2020-07-23 NOTE — TELEPHONE ENCOUNTER
Pt has refills on this. Advised to check with pharmacy to get those. If any problems he can call the office.

## 2020-07-23 NOTE — TELEPHONE ENCOUNTER
Patient called and states that he needs test strips for his blood glucose meter sent to [address] [place] [address] in [place]. Please contact patient and advise.  Thank you!!

## 2020-07-29 ENCOUNTER — VIRTUAL VISIT (OUTPATIENT)
Dept: FAMILY MEDICINE CLINIC | Age: 35
End: 2020-07-29
Payer: COMMERCIAL

## 2020-07-29 PROCEDURE — G8427 DOCREV CUR MEDS BY ELIG CLIN: HCPCS | Performed by: FAMILY MEDICINE

## 2020-07-29 PROCEDURE — 99214 OFFICE O/P EST MOD 30 MIN: CPT | Performed by: FAMILY MEDICINE

## 2020-07-29 PROCEDURE — 3046F HEMOGLOBIN A1C LEVEL >9.0%: CPT | Performed by: FAMILY MEDICINE

## 2020-07-29 PROCEDURE — 2022F DILAT RTA XM EVC RTNOPTHY: CPT | Performed by: FAMILY MEDICINE

## 2020-07-29 NOTE — PROGRESS NOTES
(PRINIVIL;ZESTRIL) 2.5 MG tablet Take 1 tablet by mouth daily Yes Yandel Martines DO   Blood Glucose Monitoring Suppl JARED 1 kit by Does not apply route daily Indications: Diabetes, Type 1 Yes Yandel Martines DO   insulin aspart (NOVOLOG) 100 UNIT/ML injection vial Use 200 units with Omnipod pump every 3 days. Yandel Martines DO   EPINEPHrine (EPIPEN 2-KARY) 0.3 MG/0.3ML SOAJ injection Use as directed for allergic reaction  Patient not taking: Reported on 7/22/2020  Yandel Martines DO   Insulin Pen Needle (PEN NEEDLES) 32G X 5 MM MISC Use with insulin pen TID with meals. Patient not taking: Reported on 7/22/2020  Duane Mathews DO       Social History     Tobacco Use    Smoking status: Current Some Day Smoker     Packs/day: 0.20     Types: Cigarettes    Smokeless tobacco: Never Used    Tobacco comment: [facility] 7/19/17   Substance Use Topics    Alcohol use:  Yes     Alcohol/week: 18.0 standard drinks     Types: 18 Cans of beer per week     Comment: has 18 beers in a weekend    Drug use: Never        Allergies   Allergen Reactions    Bee Venom Hives     With swelling    Lipitor [Atorvastatin] Hives   ,   Past Medical History:   Diagnosis Date    Asthma     Diabetes mellitus ([facility] [address]     type 1    Diabetic ketoacidosis associated with type 1 diabetes mellitus ([facility] [address] 04/28/2017    Head injury     Hyperlipidemia     Hypertension     Male erectile dysfunction     Migraine     Neuropathy in diabetes ([facility] [address]    ,   Past Surgical History:   Procedure Laterality Date    APPENDECTOMY      TYMPANOSTOMY TUBE PLACEMENT         PHYSICAL EXAMINATION:  [ INSTRUCTIONS:  \"[x]\" Indicates a positive item  \"[]\" Indicates a negative item  -- DELETE ALL ITEMS NOT EXAMINED]  Vital Signs: (As obtained by patient/caregiver or practitioner observation)    Blood pressure-  Heart rate-    Respiratory rate-    Temperature-  Pulse oximetry-     Constitutional: [] Appears well-developed and well-nourished [] No apparent distress      [] Abnormal-   Mental status  [] Alert and awake  [] Oriented to person/place/time []Able to follow commands      Eyes:  EOM    []  Normal  [] Abnormal-  Sclera  []  Normal  [] Abnormal -         Discharge []  None visible  [] Abnormal -    HENT:   [] Normocephalic, atraumatic. [] Abnormal   [] Mouth/Throat: Mucous membranes are moist.     External Ears [] Normal  [] Abnormal-     Neck: [] No visualized mass     Pulmonary/Chest: [] Respiratory effort normal.  [] No visualized signs of difficulty breathing or respiratory distress        [] Abnormal-      Musculoskeletal:   [] Normal gait with no signs of ataxia         [] Normal range of motion of neck        [] Abnormal-       Neurological:        [] No Facial Asymmetry (Cranial nerve 7 motor function) (limited exam to video visit)          [] No gaze palsy        [] Abnormal-         Skin:        [] No significant exanthematous lesions or discoloration noted on facial skin         [] Abnormal-            Psychiatric:       [] Normal Affect [] No Hallucinations        [] Abnormal-     Other pertinent observable physical exam findings-     ASSESSMENT/PLAN:  1. Type 1 diabetes mellitus with diabetic polyneuropathy (HCC)  - Hemoglobin A1C; Future    2. Diabetic polyneuropathy associated with type 1 diabetes mellitus (HCC)  - DULoxetine (CYMBALTA) 30 MG extended release capsule; Take 1 capsule by mouth daily  Dispense: 30 capsule; Refill: 0      No follow-ups on file. Guerry Schlatter is a 28 y.o. male being evaluated by a Virtual Visit (video visit) encounter to address concerns as mentioned above. A caregiver was present when appropriate. Due to this being a TeleHealth encounter ([facility]), evaluation of the following organ systems was limited: Vitals/Constitutional/EENT/Resp/CV/GI//MS/Neuro/Skin/Heme-Lymph-Imm.   Pursuant to the emergency declaration under the [facility] waiver authority and

## 2020-07-31 ENCOUNTER — TELEPHONE (OUTPATIENT)
Dept: FAMILY MEDICINE CLINIC | Age: 35
End: 2020-07-31

## 2020-07-31 NOTE — TELEPHONE ENCOUNTER
Incoming prior auth from [facility] is not covered. Requesting we change to [place] [facility]. Or complete PA form.

## 2020-07-31 NOTE — TELEPHONE ENCOUNTER
Please call pt and see if he has a preference - I refilled Humalog, as that is what pt has used before and is currently using in his pump.

## 2020-08-08 ENCOUNTER — HOSPITAL ENCOUNTER (OUTPATIENT)
Dept: LAB | Age: 35
Discharge: HOME | End: 2020-08-08
Payer: COMMERCIAL

## 2020-08-08 LAB
ALBUMIN SERPL-MCNC: 4.3 G/DL (ref 3.5–5.2)
ALBUMIN/GLOBULIN RATIO: 1.7 (ref 1–2.5)
ALP BLD-CCNC: 97 U/L (ref 40–129)
ALT SERPL-CCNC: 9 U/L (ref 5–41)
ANION GAP SERPL CALCULATED.3IONS-SCNC: 11 MMOL/L (ref 9–17)
AST SERPL-CCNC: 10 U/L
BILIRUB SERPL-MCNC: 0.41 MG/DL (ref 0.3–1.2)
BUN BLDV-MCNC: 13 MG/DL (ref 6–20)
BUN/CREAT BLD: 23 (ref 9–20)
CALCIUM SERPL-MCNC: 9.6 MG/DL (ref 8.6–10.4)
CHLORIDE BLD-SCNC: 105 MMOL/L (ref 98–107)
CHOLESTEROL/HDL RATIO: 3.1
CHOLESTEROL: 184 MG/DL
CO2: 26 MMOL/L (ref 20–31)
CREAT SERPL-MCNC: 0.56 MG/DL (ref 0.7–1.2)
CREATININE URINE: 155.9 MG/DL (ref 39–259)
GFR AFRICAN AMERICAN: >60 ML/MIN
GFR NON-AFRICAN AMERICAN: >60 ML/MIN
GFR SERPL CREATININE-BSD FRML MDRD: ABNORMAL ML/MIN/{1.73_M2}
GFR SERPL CREATININE-BSD FRML MDRD: ABNORMAL ML/MIN/{1.73_M2}
GLUCOSE BLD-MCNC: 162 MG/DL (ref 70–99)
HDLC SERPL-MCNC: 60 MG/DL
LDL CHOLESTEROL: 110 MG/DL (ref 0–130)
MICROALBUMIN/CREAT 24H UR: 45 MG/L
MICROALBUMIN/CREAT UR-RTO: 29 MCG/MG CREAT
POTASSIUM SERPL-SCNC: 4.1 MMOL/L (ref 3.7–5.3)
SODIUM BLD-SCNC: 142 MMOL/L (ref 135–144)
TOTAL PROTEIN: 6.9 G/DL (ref 6.4–8.3)
TRIGL SERPL-MCNC: 71 MG/DL
VLDLC SERPL CALC-MCNC: NORMAL MG/DL (ref 1–30)

## 2020-08-08 PROCEDURE — 80061 LIPID PANEL: CPT

## 2020-08-08 PROCEDURE — 82043 UR ALBUMIN QUANTITATIVE: CPT

## 2020-08-08 PROCEDURE — 82570 ASSAY OF URINE CREATININE: CPT

## 2020-08-08 PROCEDURE — 80053 COMPREHEN METABOLIC PANEL: CPT

## 2020-08-08 PROCEDURE — 36415 COLL VENOUS BLD VENIPUNCTURE: CPT

## 2020-08-10 RX ORDER — DULOXETIN HYDROCHLORIDE 30 MG/1
30 CAPSULE, DELAYED RELEASE ORAL DAILY
Qty: 30 CAPSULE | Refills: 0 | Status: SHIPPED | OUTPATIENT
Start: 2020-08-10 | End: 2020-11-03

## 2020-08-12 ENCOUNTER — TELEPHONE (OUTPATIENT)
Dept: FAMILY MEDICINE CLINIC | Age: 35
End: 2020-08-12

## 2020-08-12 NOTE — TELEPHONE ENCOUNTER
Pt calling stating he was seen at [facility] ER for neuropathy pain and they could do nothing for him, questions what KB recommends, please advise at above number.

## 2020-08-13 NOTE — TELEPHONE ENCOUNTER
Did he start the Cymbalta 30 mg that we just added to his Gabapentin at his last VV? We added this to see if it would help more with the neuropathic pain.

## 2020-10-03 RX ORDER — GABAPENTIN 300 MG/1
300 CAPSULE ORAL 3 TIMES DAILY
Qty: 90 CAPSULE | Refills: 2 | Status: SHIPPED | OUTPATIENT
Start: 2020-10-03 | End: 2021-03-02 | Stop reason: DRUGHIGH

## 2020-10-06 ENCOUNTER — TELEPHONE (OUTPATIENT)
Dept: FAMILY MEDICINE CLINIC | Age: 35
End: 2020-10-06

## 2020-10-08 ENCOUNTER — TELEPHONE (OUTPATIENT)
Dept: FAMILY MEDICINE CLINIC | Age: 35
End: 2020-10-08

## 2020-11-03 ENCOUNTER — OFFICE VISIT (OUTPATIENT)
Dept: PRIMARY CARE CLINIC | Age: 35
End: 2020-11-03
Payer: COMMERCIAL

## 2020-11-03 VITALS
WEIGHT: 154 LBS | HEART RATE: 92 BPM | TEMPERATURE: 97.5 F | DIASTOLIC BLOOD PRESSURE: 86 MMHG | SYSTOLIC BLOOD PRESSURE: 124 MMHG | BODY MASS INDEX: 22.05 KG/M2 | HEIGHT: 70 IN

## 2020-11-03 PROCEDURE — 99212 OFFICE O/P EST SF 10 MIN: CPT

## 2020-11-03 PROCEDURE — 99213 OFFICE O/P EST LOW 20 MIN: CPT | Performed by: NURSE PRACTITIONER

## 2020-11-03 RX ORDER — CEPHALEXIN 500 MG/1
500 CAPSULE ORAL 4 TIMES DAILY
Qty: 28 CAPSULE | Refills: 0 | Status: SHIPPED | OUTPATIENT
Start: 2020-11-03 | End: 2020-11-10

## 2020-11-03 ASSESSMENT — ENCOUNTER SYMPTOMS
SHORTNESS OF BREATH: 0
BURN: 1

## 2020-11-03 NOTE — PATIENT INSTRUCTIONS
Keep burn clean and dry. Apply thin amount of silvadene and clean dressing, can add gauze to provide padding when wearing shoe at work. Start antibiotic today. Follow up with podiatry. If unable to see podiatry for any reason, please follow up with PCP to monitor burn. Patient Education        Diabetes Foot Health: Care Instructions  Your Care Instructions     When you have diabetes, your feet need extra care and attention. Diabetes can damage the nerve endings and blood vessels in your feet, making you less likely to notice when your feet are injured. Diabetes also limits your body's ability to fight infection and get blood to areas that need it. If you get a minor foot injury, it could become an ulcer or a serious infection. With good foot care, you can prevent most of these problems. Caring for your feet can be quick and easy. Most of the care can be done when you are bathing or getting ready for bed. Follow-up care is a key part of your treatment and safety. Be sure to make and go to all appointments, and call your doctor if you are having problems. It's also a good idea to know your test results and keep a list of the medicines you take. How can you care for yourself at home? · Keep your blood sugar close to normal by watching what and how much you eat, monitoring blood sugar, taking medicines if prescribed, and getting regular exercise. · Do not smoke. Smoking affects blood flow and can make foot problems worse. If you need help quitting, talk to your doctor about stop-smoking programs and medicines. These can increase your chances of quitting for good. · Eat a diet that is low in fats. High fat intake can cause fat to build up in your blood vessels and decrease blood flow. · Inspect your feet daily for blisters, cuts, cracks, or sores. If you cannot see well, use a mirror or have someone help you. · Take care of your feet:  ? Wash your feet every day. Use warm (not hot) water.  Check the water temperature with your wrists or other part of your body, not your feet. ? Dry your feet well. Pat them dry. Do not rub the skin on your feet too hard. Dry well between your toes. If the skin on your feet stays moist, bacteria or a fungus can grow, which can lead to infection. ? Keep your skin soft. Use moisturizing skin cream to keep the skin on your feet soft and prevent calluses and cracks. But do not put the cream between your toes, and stop using any cream that causes a rash. ? Clean underneath your toenails carefully. Do not use a sharp object to clean underneath your toenails. Use the blunt end of a nail file or other rounded tool. ? Trim and file your toenails straight across to prevent ingrown toenails. Use a nail clipper, not scissors. Use an emery board to smooth the edges. · Change socks daily. Socks without seams are best, because seams often rub the feet. You can find socks for people with diabetes from specialty catalogs. · Look inside your shoes every day for things like gravel or torn linings, which could cause blisters or sores. · Buy shoes that fit well:  ? Look for shoes that have plenty of space around the toes. This helps prevent bunions and blisters. ? Try on shoes while wearing the kind of socks you will usually wear with the shoes. ? Avoid plastic shoes. They may rub your feet and cause blisters. Good shoes should be made of materials that are flexible and breathable, such as leather or cloth. ? Break in new shoes slowly by wearing them for no more than an hour a day for several days. Take extra time to check your feet for red areas, blisters, or other problems after you wear new shoes. · Do not go barefoot. Do not wear sandals, and do not wear shoes with very thin soles. Thin soles are easy to puncture. They also do not protect your feet from hot pavement or cold weather. · Have your doctor check your feet during each visit. If you have a foot problem, see your doctor.  Do not try to treat an early foot problem at home. Home remedies or treatments that you can buy without a prescription (such as corn removers) can be harmful. · Always get early treatment for foot problems. A minor irritation can lead to a major problem if not properly cared for early. When should you call for help? Call your doctor now or seek immediate medical care if:    · You have a foot sore, an ulcer or break in the skin that is not healing after 4 days, bleeding corns or calluses, or an ingrown toenail.     · You have blue or black areas, which can mean bruising or blood flow problems.     · You have peeling skin or tiny blisters between your toes or cracking or oozing of the skin.     · You have a fever for more than 24 hours and a foot sore.     · You have new numbness or tingling in your feet that does not go away after you move your feet or change positions.     · You have unexplained or unusual swelling of the foot or ankle. Watch closely for changes in your health, and be sure to contact your doctor if:    · You cannot do proper foot care. Where can you learn more? Go to https://Waps.cnpeDr. TATTOFF.[employer]. org and sign in to your L[employer] Optical[employer]s account. Enter A739 in the Nogacom box to learn more about \"Diabetes Foot Health: Care Instructions. \"     If you do not have an account, please click on the \"Sign Up Now\" link. Current as of: December 20, 2019               Content Version: 12.6  © 1145-0761 [employer]. Care instructions adapted under license by [facility] ([facility]). If you have questions about a medical condition or this instruction, always ask your healthcare professional. [place] [address] any warranty or liability for your use of this information. Patient Education        Rodas: Care Instructions  Your Care Instructions     Rodas--even minor ones--can be very painful.  A minor burn may heal within several days, while a more serious burn may take weeks them as directed. Do not stop taking them just because you feel better. You need to take the full course of antibiotics. For pain and itching  · Take pain medicines exactly as directed. ? If the doctor gave you a prescription medicine for pain, take it as prescribed. ? If you are not taking a prescription pain medicine, ask your doctor if you can take an over-the-counter medicine. · If the burn itches, try not to scratch it. Try an over-the-counter antihistamine such as diphenhydramine (Benadryl) or loratadine (Claritin). Read and follow all instructions on the label. When should you call for help? Call your doctor now or seek immediate medical care if:    · Your pain gets worse.     · You have symptoms of infection, such as:  ? Increased pain, swelling, warmth, or redness near the burn. ? Red streaks leading from the burn. ? Pus draining from the burn. ? A fever. Watch closely for changes in your health, and be sure to contact your doctor if:    · You do not get better as expected. Where can you learn more? Go to https://CICCWORLD.TapFit. org and sign in to your POPRAGEOUS account. Enter H897 in the [facility] box to learn more about \"Burns: Care Instructions. \"     If you do not have an account, please click on the \"Sign Up Now\" link. Current as of: June 26, 2019               Content Version: 12.6  © 4168-6314 [email or web address]. Care instructions adapted under license by [facility] ([facility]). If you have questions about a medical condition or this instruction, always ask your healthcare professional. [place] [address] any warranty or liability for your use of this information.
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
## 2020-11-05 RX ORDER — PERPHENAZINE 16 MG/1
TABLET, FILM COATED ORAL
Qty: 400 STRIP | Refills: 3 | Status: SHIPPED | OUTPATIENT
Start: 2020-11-05 | End: 2021-05-05 | Stop reason: ALTCHOICE

## 2020-11-11 ENCOUNTER — VIRTUAL VISIT (OUTPATIENT)
Dept: FAMILY MEDICINE CLINIC | Age: 35
End: 2020-11-11
Payer: MEDICARE

## 2020-12-10 ENCOUNTER — TELEPHONE (OUTPATIENT)
Dept: FAMILY MEDICINE CLINIC | Age: 35
End: 2020-12-10

## 2020-12-14 NOTE — TELEPHONE ENCOUNTER
[facility] Transitions Initial Follow Up Call    Outreach made within 2 business days of discharge: Yes    Patient: Konrad Arroyo Patient : 1985   MRN: [id number]  Reason for Admission: osteomyelitis right foot  Discharge Date: 2020       Spoke with: Good Rodríguez    Discharge department/facility: [facility]    TCM Interactive Patient Contact:  Was patient able to fill all prescriptions: Yes  Was patient instructed to bring all medications to the follow-up visit: Yes  Is patient taking all medications as directed in the discharge summary? Yes  Does patient understand their discharge instructions: Yes  Does patient have questions or concerns that need addressed prior to 7-14 day follow up office visit: no, getting antibiotics via PICC line. wound looks better- seeing Wound Care at [facility].     Scheduled appointment within 7-14 days    Follow Up  Future Appointments   Date Time Provider Asael Patel   2020 11:10 AM MD FARHAD RobbAM DPP   2021  9:30 AM Titus Wallace MD DVAS DPP       Jonny Guerra RN

## 2020-12-18 ENCOUNTER — OFFICE VISIT (OUTPATIENT)
Dept: FAMILY MEDICINE CLINIC | Age: 35
End: 2020-12-18
Payer: COMMERCIAL

## 2020-12-18 VITALS
HEART RATE: 99 BPM | DIASTOLIC BLOOD PRESSURE: 78 MMHG | HEIGHT: 70 IN | BODY MASS INDEX: 23.34 KG/M2 | SYSTOLIC BLOOD PRESSURE: 122 MMHG | WEIGHT: 163 LBS | OXYGEN SATURATION: 99 %

## 2020-12-18 PROCEDURE — 3046F HEMOGLOBIN A1C LEVEL >9.0%: CPT | Performed by: FAMILY MEDICINE

## 2020-12-18 PROCEDURE — G8420 CALC BMI NORM PARAMETERS: HCPCS | Performed by: FAMILY MEDICINE

## 2020-12-18 PROCEDURE — 2022F DILAT RTA XM EVC RTNOPTHY: CPT | Performed by: FAMILY MEDICINE

## 2020-12-18 PROCEDURE — G8427 DOCREV CUR MEDS BY ELIG CLIN: HCPCS | Performed by: FAMILY MEDICINE

## 2020-12-18 PROCEDURE — 99214 OFFICE O/P EST MOD 30 MIN: CPT | Performed by: FAMILY MEDICINE

## 2020-12-18 PROCEDURE — G8482 FLU IMMUNIZE ORDER/ADMIN: HCPCS | Performed by: FAMILY MEDICINE

## 2020-12-18 PROCEDURE — 4004F PT TOBACCO SCREEN RCVD TLK: CPT | Performed by: FAMILY MEDICINE

## 2020-12-18 PROCEDURE — 90686 IIV4 VACC NO PRSV 0.5 ML IM: CPT | Performed by: FAMILY MEDICINE

## 2020-12-18 RX ORDER — ALUMINUM HYDROXIDE, MAGNESIUM HYDROXIDE, DIMETHICONE 400; 400; 40 MG/5ML; MG/5ML; MG/5ML
1 LIQUID ORAL 2 TIMES DAILY WITH MEALS
COMMUNITY
Start: 2020-12-10 | End: 2021-01-21

## 2020-12-18 RX ORDER — DOXYCYCLINE HYCLATE 100 MG
TABLET ORAL
COMMUNITY
Start: 2020-12-10 | End: 2021-03-02

## 2020-12-18 RX ORDER — AMITRIPTYLINE HYDROCHLORIDE 75 MG/1
TABLET, FILM COATED ORAL
COMMUNITY
Start: 2020-12-10 | End: 2021-01-06

## 2020-12-18 RX ORDER — TRAMADOL HYDROCHLORIDE 50 MG/1
50 TABLET ORAL EVERY 4 HOURS PRN
Qty: 30 TABLET | Refills: 0 | Status: SHIPPED | OUTPATIENT
Start: 2020-12-18 | End: 2021-01-13 | Stop reason: SDUPTHER

## 2020-12-18 RX ORDER — CLINDAMYCIN PHOSPHATE 600 MG/50ML
600 INJECTION INTRAVENOUS EVERY 8 HOURS
COMMUNITY
Start: 2020-12-10 | End: 2021-01-21

## 2020-12-18 NOTE — PROGRESS NOTES
Have you had an allergic reaction to the flu (influenza) shot? no  Are you allergic to eggs or any component of the flu vaccine? no  Do you have a history of Guillain-Canton Syndrome (GBS), which is paralysis after receiving the flu vaccine? no  Are you feeling well today? yes  Flu vaccine given as ordered. Patient tolerated it well. No questions re: VIS information.
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
## 2020-12-30 ENCOUNTER — TELEPHONE (OUTPATIENT)
Dept: FAMILY MEDICINE CLINIC | Age: 35
End: 2020-12-30
Payer: COMMERCIAL

## 2020-12-30 PROCEDURE — G0180 MD CERTIFICATION HHA PATIENT: HCPCS | Performed by: FAMILY MEDICINE

## 2020-12-30 RX ORDER — LISINOPRIL 5 MG/1
5 TABLET ORAL DAILY
COMMUNITY
Start: 2020-12-10 | End: 2021-01-06

## 2020-12-30 NOTE — TELEPHONE ENCOUNTER
[facility] plan of care reviewed and new certification completed for service dates 12-10-20 to 2-7-21. Verified current medications and allergies. Time spent by physician on activities to coordinate services, documenting, medical decision making, and review of reports, treatment plans, and test results is 15 minutes.

## 2021-01-05 DIAGNOSIS — E10.51 TYPE 1 DIABETES MELLITUS WITH PERIPHERAL ANGIOPATHY WITHOUT GANGRENE (HCC): ICD-10-CM

## 2021-01-05 DIAGNOSIS — I10 ESSENTIAL (PRIMARY) HYPERTENSION: ICD-10-CM

## 2021-01-05 DIAGNOSIS — G89.29 OTHER CHRONIC PAIN: Primary | ICD-10-CM

## 2021-01-07 RX ORDER — LISINOPRIL 5 MG/1
TABLET ORAL
Qty: 30 TABLET | Refills: 1 | Status: SHIPPED | OUTPATIENT
Start: 2021-01-07 | End: 2021-03-08

## 2021-01-07 RX ORDER — AMITRIPTYLINE HYDROCHLORIDE 75 MG/1
TABLET, FILM COATED ORAL
Qty: 30 TABLET | Refills: 1 | Status: SHIPPED | OUTPATIENT
Start: 2021-01-07 | End: 2021-03-08

## 2021-01-13 DIAGNOSIS — T25.321D BURN OF RIGHT FOOT, THIRD DEGREE, SUBSEQUENT ENCOUNTER: ICD-10-CM

## 2021-01-13 NOTE — TELEPHONE ENCOUNTER
Meseret Corona called requesting a refill of the below medication which has been pended for you:     Requested Prescriptions     Pending Prescriptions Disp Refills    traMADol (ULTRAM) 50 MG tablet 30 tablet 0     Sig: Take 1 tablet by mouth every 4 hours as needed for Pain for up to 5 days. Intended supply: 5 days. Take lowest dose possible to manage pain       Last Appointment Date: 11/11/2020  Next Appointment Date: 3/2/2021    Allergies   Allergen Reactions    Bee Venom Hives     With swelling    Vancomycin Other (See Comments)     From [facility] care plan - reaction unknown.      Lipitor [Atorvastatin] Hives
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
## 2021-01-15 NOTE — TELEPHONE ENCOUNTER
Patient very upset as he called in on /13/2021 and has not been addressed. Informed he should go to UC if needs something for pain as [facility] provider who takes care of wound vac says need to come from family doctor. Refused UC and wants DR Martines to address message on Tuesday.

## 2021-01-17 RX ORDER — TRAMADOL HYDROCHLORIDE 50 MG/1
50 TABLET ORAL 2 TIMES DAILY PRN
Qty: 20 TABLET | Refills: 0 | Status: SHIPPED | OUTPATIENT
Start: 2021-01-17 | End: 2021-01-27

## 2021-01-18 NOTE — TELEPHONE ENCOUNTER
Please apologize to pt for the delay in refilling his medication. I will refill for now; however, he should start weaning down his use of this, as he can tolerate. This med should be used short-term, and as his wound heals, he should reduce his use, and then start using only OTC analgesics. Pt to f/u with Wound Care/Podiatry as directed; also seeing Vascular Surgery in 3.5 weeks (was supposed to see on 1/14, but pt cancelled). Controlled Substance Monitoring:    Acute and Chronic Pain Monitoring:   RX Monitoring 1/17/2021   Attestation -   Periodic Controlled Substance Monitoring No signs of potential drug abuse or diversion identified.     -    -

## 2021-01-20 LAB
ALBUMIN SERPL-MCNC: 3.8 G/DL
ALP BLD-CCNC: 92 U/L
ALT SERPL-CCNC: 12 U/L
ANION GAP SERPL CALCULATED.3IONS-SCNC: NORMAL MMOL/L
AST SERPL-CCNC: 15 U/L
BASOPHILS ABSOLUTE: ABNORMAL
BASOPHILS RELATIVE PERCENT: ABNORMAL
BILIRUB SERPL-MCNC: 0.3 MG/DL (ref 0.1–1.4)
BUN BLDV-MCNC: 16 MG/DL
CALCIUM SERPL-MCNC: 9.1 MG/DL
CHLORIDE BLD-SCNC: 105 MMOL/L
CO2: 29 MMOL/L
CREAT SERPL-MCNC: 0.4 MG/DL
EOSINOPHILS ABSOLUTE: ABNORMAL
EOSINOPHILS RELATIVE PERCENT: ABNORMAL
GFR CALCULATED: NORMAL
GLUCOSE BLD-MCNC: NORMAL MG/DL
HCT VFR BLD CALC: 37 % (ref 41–53)
HEMOGLOBIN: 13.4 G/DL (ref 13.5–17.5)
LYMPHOCYTES ABSOLUTE: ABNORMAL
LYMPHOCYTES RELATIVE PERCENT: ABNORMAL
MCH RBC QN AUTO: ABNORMAL PG
MCHC RBC AUTO-ENTMCNC: ABNORMAL G/DL
MCV RBC AUTO: ABNORMAL FL
MONOCYTES ABSOLUTE: ABNORMAL
MONOCYTES RELATIVE PERCENT: ABNORMAL
NEUTROPHILS ABSOLUTE: ABNORMAL
NEUTROPHILS RELATIVE PERCENT: ABNORMAL
PLATELET # BLD: 274 K/ΜL
PMV BLD AUTO: ABNORMAL FL
POTASSIUM SERPL-SCNC: 3.8 MMOL/L
RBC # BLD: ABNORMAL 10*6/UL
SEDIMENTATION RATE, ERYTHROCYTE: 10
SODIUM BLD-SCNC: 137 MMOL/L
TOTAL PROTEIN: 6.8
WBC # BLD: 4.6 10^3/ML

## 2021-03-01 ENCOUNTER — TELEPHONE (OUTPATIENT)
Dept: FAMILY MEDICINE CLINIC | Age: 36
End: 2021-03-01

## 2021-03-02 ENCOUNTER — OFFICE VISIT (OUTPATIENT)
Dept: FAMILY MEDICINE CLINIC | Age: 36
End: 2021-03-02
Payer: COMMERCIAL

## 2021-03-02 ENCOUNTER — HOSPITAL ENCOUNTER (OUTPATIENT)
Dept: LAB | Age: 36
Discharge: HOME | End: 2021-03-02
Payer: COMMERCIAL

## 2021-03-02 VITALS
DIASTOLIC BLOOD PRESSURE: 88 MMHG | WEIGHT: 168 LBS | BODY MASS INDEX: 24.05 KG/M2 | OXYGEN SATURATION: 96 % | HEIGHT: 70 IN | HEART RATE: 95 BPM | SYSTOLIC BLOOD PRESSURE: 138 MMHG | TEMPERATURE: 97.4 F

## 2021-03-02 DIAGNOSIS — E11.40 CHRONIC PAINFUL DIABETIC NEUROPATHY (HCC): ICD-10-CM

## 2021-03-02 DIAGNOSIS — N52.1 ERECTILE DYSFUNCTION DUE TO DISEASES CLASSIFIED ELSEWHERE: ICD-10-CM

## 2021-03-02 DIAGNOSIS — Z91.030 ALLERGY TO BEE STING: ICD-10-CM

## 2021-03-02 DIAGNOSIS — E10.42 TYPE 1 DIABETES MELLITUS WITH DIABETIC POLYNEUROPATHY (HCC): ICD-10-CM

## 2021-03-02 DIAGNOSIS — E78.2 MIXED HYPERLIPIDEMIA: ICD-10-CM

## 2021-03-02 DIAGNOSIS — T25.321D BURN OF RIGHT FOOT, THIRD DEGREE, SUBSEQUENT ENCOUNTER: ICD-10-CM

## 2021-03-02 DIAGNOSIS — E10.42 TYPE 1 DIABETES MELLITUS WITH DIABETIC POLYNEUROPATHY (HCC): Primary | ICD-10-CM

## 2021-03-02 PROCEDURE — 83036 HEMOGLOBIN GLYCOSYLATED A1C: CPT

## 2021-03-02 PROCEDURE — 4004F PT TOBACCO SCREEN RCVD TLK: CPT | Performed by: FAMILY MEDICINE

## 2021-03-02 PROCEDURE — 2022F DILAT RTA XM EVC RTNOPTHY: CPT | Performed by: FAMILY MEDICINE

## 2021-03-02 PROCEDURE — G8420 CALC BMI NORM PARAMETERS: HCPCS | Performed by: FAMILY MEDICINE

## 2021-03-02 PROCEDURE — 3046F HEMOGLOBIN A1C LEVEL >9.0%: CPT | Performed by: FAMILY MEDICINE

## 2021-03-02 PROCEDURE — 36415 COLL VENOUS BLD VENIPUNCTURE: CPT

## 2021-03-02 PROCEDURE — 99214 OFFICE O/P EST MOD 30 MIN: CPT | Performed by: FAMILY MEDICINE

## 2021-03-02 PROCEDURE — G8482 FLU IMMUNIZE ORDER/ADMIN: HCPCS | Performed by: FAMILY MEDICINE

## 2021-03-02 PROCEDURE — G8427 DOCREV CUR MEDS BY ELIG CLIN: HCPCS | Performed by: FAMILY MEDICINE

## 2021-03-02 RX ORDER — SIMVASTATIN 20 MG
20 TABLET ORAL EVERY EVENING
Qty: 90 TABLET | Refills: 1 | Status: SHIPPED | OUTPATIENT
Start: 2021-03-02 | End: 2022-06-15 | Stop reason: SDUPTHER

## 2021-03-02 RX ORDER — EPINEPHRINE 0.3 MG/.3ML
INJECTION SUBCUTANEOUS
Qty: 1 EACH | Refills: 1 | Status: SHIPPED | OUTPATIENT
Start: 2021-03-02 | End: 2022-01-11 | Stop reason: SDUPTHER

## 2021-03-02 RX ORDER — GABAPENTIN 400 MG/1
400 CAPSULE ORAL 3 TIMES DAILY
Qty: 120 CAPSULE | Refills: 1 | Status: SHIPPED | OUTPATIENT
Start: 2021-03-02 | End: 2021-05-04

## 2021-03-02 ASSESSMENT — PATIENT HEALTH QUESTIONNAIRE - PHQ9
SUM OF ALL RESPONSES TO PHQ QUESTIONS 1-9: 0
2. FEELING DOWN, DEPRESSED OR HOPELESS: 0

## 2021-03-02 NOTE — LETTER
[facility] department of [facility]  Phone: [phone]  Fax: [address], DO        March 2, 2021     Patient: Maria Elena Richter   YOB: 1985   Date of Visit: 3/2/2021       To Whom it May Concern: Maria Elena Richter was seen in my clinic on 3/2/2021. Please excuse him from work due to appointment. If you have any questions or concerns, please don't hesitate to call.     Sincerely,         Nahum Daley, DO
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
## 2021-03-03 ENCOUNTER — OFFICE VISIT (OUTPATIENT)
Dept: DIABETES SERVICES | Age: 36
End: 2021-03-03
Payer: COMMERCIAL

## 2021-03-03 VITALS
BODY MASS INDEX: 23.62 KG/M2 | WEIGHT: 165 LBS | HEART RATE: 96 BPM | DIASTOLIC BLOOD PRESSURE: 88 MMHG | HEIGHT: 70 IN | SYSTOLIC BLOOD PRESSURE: 132 MMHG | RESPIRATION RATE: 16 BRPM

## 2021-03-03 DIAGNOSIS — Z71.89 DIABETES EDUCATION, ENCOUNTER FOR: ICD-10-CM

## 2021-03-03 DIAGNOSIS — E10.42 DIABETIC POLYNEUROPATHY ASSOCIATED WITH TYPE 1 DIABETES MELLITUS (HCC): Primary | ICD-10-CM

## 2021-03-03 DIAGNOSIS — E78.2 MIXED HYPERLIPIDEMIA: ICD-10-CM

## 2021-03-03 LAB
ESTIMATED AVERAGE GLUCOSE: 312 MG/DL
HBA1C MFR BLD: 12.5 % (ref 4–6)

## 2021-03-03 PROCEDURE — 2022F DILAT RTA XM EVC RTNOPTHY: CPT | Performed by: NURSE PRACTITIONER

## 2021-03-03 PROCEDURE — 3046F HEMOGLOBIN A1C LEVEL >9.0%: CPT | Performed by: NURSE PRACTITIONER

## 2021-03-03 PROCEDURE — G8427 DOCREV CUR MEDS BY ELIG CLIN: HCPCS | Performed by: NURSE PRACTITIONER

## 2021-03-03 PROCEDURE — 99205 OFFICE O/P NEW HI 60 MIN: CPT | Performed by: NURSE PRACTITIONER

## 2021-03-03 PROCEDURE — 4004F PT TOBACCO SCREEN RCVD TLK: CPT | Performed by: NURSE PRACTITIONER

## 2021-03-03 PROCEDURE — G8482 FLU IMMUNIZE ORDER/ADMIN: HCPCS | Performed by: NURSE PRACTITIONER

## 2021-03-03 PROCEDURE — G8420 CALC BMI NORM PARAMETERS: HCPCS | Performed by: NURSE PRACTITIONER

## 2021-03-03 RX ORDER — BLOOD-GLUCOSE TRANSMITTER
EACH MISCELLANEOUS
Qty: 1 EACH | Refills: 3 | Status: SHIPPED | OUTPATIENT
Start: 2021-03-03 | End: 2021-06-30 | Stop reason: SDUPTHER

## 2021-03-03 RX ORDER — BLOOD-GLUCOSE,RECEIVER,CONT
1 EACH MISCELLANEOUS DAILY
Qty: 1 DEVICE | Refills: 0 | Status: SHIPPED | OUTPATIENT
Start: 2021-03-03

## 2021-03-03 RX ORDER — BLOOD-GLUCOSE SENSOR
EACH MISCELLANEOUS
Qty: 3 EACH | Refills: 3 | Status: SHIPPED | OUTPATIENT
Start: 2021-03-03 | End: 2021-11-09

## 2021-03-03 ASSESSMENT — ENCOUNTER SYMPTOMS
DIARRHEA: 0
VISUAL CHANGE: 1
RESPIRATORY NEGATIVE: 1
SHORTNESS OF BREATH: 0
BLURRED VISION: 1
ABDOMINAL PAIN: 0

## 2021-03-03 NOTE — PROGRESS NOTES
[facility] [address]  [address] [facility], [address]  [place] [address] [facility] [address] [address] [phone]        HISTORY:    Cornelio Beaulieu presents today for evaluation and management of:  Chief Complaint   Patient presents with    Diabetes     Type 1. DX in 2007       Diabetes  He presents for his initial diabetic visit. He has type 1 diabetes mellitus. MedicAlert identification noted. Onset time: 2007. His disease course has been worsening. There are no hypoglycemic associated symptoms. Pertinent negatives for hypoglycemia include no confusion, dizziness, headaches, seizures or tremors. Associated symptoms include blurred vision, fatigue, foot paresthesias, foot ulcerations, polydipsia, polyphagia, polyuria, visual change and weight loss. Pertinent negatives for diabetes include no chest pain and no weakness. There are no hypoglycemic complications. Symptoms are stable. Diabetic complications include impotence and peripheral neuropathy. Pertinent negatives for diabetic complications include no CVA, heart disease, nephropathy or retinopathy. Risk factors for coronary artery disease include diabetes mellitus, dyslipidemia, family history, male sex and stress. Current diabetic treatment includes insulin pump. He is compliant with treatment some of the time. His weight is stable. He is following a generally unhealthy diet. Meal planning includes carbohydrate counting. He has not had a previous visit with a dietitian. He rarely participates in exercise. An ACE inhibitor/angiotensin II receptor blocker is being taken. Eye exam is not current. Interval History:    Current Diabetic Medications  novolog for use with omnipod. DKA episodes: Multiple episodes of DKA related to not taking insulin and on diagnosis. 03/03/21   He was diagnosed in 2007, had flu like symptoms and lost vision and went to the ER started on insulin.  Was seen Endo in [place] but does not like the drive has not been seen in over a year. He has had episodes of DKA which he relates to not taking insulin. He also admits to medication non compliance. He will often forget to replace the omnipod dash system. He was previously on medtronic insulin pump. He feels he does well on the pump is in place. He would like cgm therapy. Diet: counting carbs  Exercise: none  BS testin times daily denies hypoglycemia. Issues: denies     -Patient is on an insulin pump and testing blood sugars 4 more times daily and using these blood sugars for frequent insulin adjustments. High cholesterol-  Takes Zocor and denies any adverse effects with its use. Watches diet and exercise. Hypertension-  Takes lisinopril and denies any adverse effects with their use. Watches diet and exercise. Denies any chest pain, dizziness or edema. Obesity- Working on weight loss. Past Medical History:   Diagnosis Date    Asthma     Diabetes mellitus ([facility]     type 1    Diabetic ketoacidosis associated with type 1 diabetes mellitus ([facility] 2017    Head injury     Hyperlipidemia     Hypertension     Male erectile dysfunction     Migraine     Neuropathy in diabetes ([facility]      Family History   Problem Relation Age of Onset    Diabetes Mother         type 1    Heart Disease Mother     Kidney Disease Mother     High Cholesterol Mother     High Blood Pressure Mother     High Blood Pressure Father     Diabetes Father         type 2    Heart Disease Father      Social History     Tobacco Use    Smoking status: Current Some Day Smoker     Packs/day: 0.20     Types: Cigarettes    Smokeless tobacco: Never Used    Tobacco comment: ECLAMB RRT 17   Substance Use Topics    Alcohol use:  Yes     Alcohol/week: 18.0 standard drinks     Types: 18 Cans of beer per week     Comment: has 18 beers in a weekend    Drug use: Never     Allergies   Allergen Reactions    Bee Venom Hives     With swelling  Vancomycin Other (See Comments)     From [facility] plan - reaction unknown.  Lipitor [Atorvastatin] Hives       MEDICATIONS:  Current Outpatient Medications   Medication Sig Dispense Refill    Continuous Blood Gluc  (DEXCOM G6 ) JARED 1 Units by Does not apply route daily 1 Device 0    Continuous Blood Gluc Sensor (DEXCOM G6 SENSOR) MISC Use once sensor every 10 days 3 each 3    Continuous Blood Gluc Transmit (DEXCOM G6 TRANSMITTER) MISC Use one transmitter every 3 months 1 each 3    gabapentin (NEURONTIN) 400 MG capsule Take 1 capsule by mouth 3 times daily for 30 days. Take 1 capsule by mouth TID x 1 week, then increase to 1 cap qam, 1 cap qafternoon, and 2 caps at bedtime. 120 capsule 1    simvastatin (ZOCOR) 20 MG tablet Take 1 tablet by mouth every evening 90 tablet 1    EPINEPHrine (EPIPEN 2-KARY) 0.3 MG/0.3ML SOAJ injection Use as directed for allergic reaction 1 each 1    amitriptyline (ELAVIL) 75 MG tablet take 1 tablet by mouth at bedtime 30 tablet 1    lisinopril (PRINIVIL;ZESTRIL) 5 MG tablet take 1 tablet by mouth once daily 30 tablet 1    insulin aspart (NOVOLOG) 100 UNIT/ML injection vial Use 200 units with Omnipod pump every 3 days. 6 vial 1    Insulin Disposable Pump (OMNIPOD DASH 5 PACK PODS) MISC Inject 1 each as directed every 3 days 200 units of humalog in each pod last 3 days      Insulin Pump - Insulin regular Inject into the skin continuous Running with humalog in the pump - works with calorie count      blood glucose test strips (CONTOUR NEXT TEST) strip Use to test blood glucose 4 times daily and as needed due to fluctuating blood sugars and sliding scale insulin use. 400 strip 3    silver sulfADIAZINE (SILVADENE) 1 % cream Apply topically daily. (Patient not taking: Reported on 3/2/2021) 20 g 0    Lancets MISC Indications: Diabetes Patient is testing 4 times daily due to fluctuating blood sugars and sliding scale use.  100 each 11    Insulin Pen Needle (PEN NEEDLES) 32G X 5 MM MISC Use with insulin pen TID with meals. 90 each 5    Blood Glucose Monitoring Suppl JARED 1 kit by Does not apply route daily Indications: Diabetes, Type 1 1 Device 0     No current facility-administered medications for this visit. Review ofSymptoms:  Review of Systems   Constitutional: Positive for fatigue and weight loss. Negative for unexpected weight change. Eyes: Positive for blurred vision. Negative for visual disturbance. Respiratory: Negative. Negative for shortness of breath. Cardiovascular: Negative for chest pain and leg swelling. Gastrointestinal: Negative for abdominal pain and diarrhea. Endocrine: Positive for polydipsia, polyphagia and polyuria. Genitourinary: Positive for impotence. Musculoskeletal: Negative. Skin: Negative for rash and wound. Neurological: Negative for dizziness, tremors, seizures, weakness and headaches. Psychiatric/Behavioral: Negative. Negative for confusion and decreased concentration. Theremainder of a complete 14-point review of systems is negative. Vital Signs: /88 (Site: Left Upper Arm, Position: Sitting, Cuff Size: Medium Adult)   Pulse 96   Resp 16   Ht 5' 10\" (1.778 m)   Wt 165 lb (74.8 kg)   BMI 23.68 kg/m²      Wt Readings from Last 3 Encounters:   03/03/21 165 lb (74.8 kg)   03/02/21 168 lb (76.2 kg)   12/18/20 163 lb (73.9 kg)     Body mass index is 23.68 kg/m².   LABS:  Hemoglobin A1C   Date Value Ref Range Status   03/02/2021 12.5 (H) 4.0 - 6.0 % Final   12/14/2018 >14.0 (H) 4.8 - 5.9 % Final     Lab Results   Component Value Date    LABMICR 29 (H) 08/08/2020     Lab Results   Component Value Date     01/20/2021    K 3.8 01/20/2021     01/20/2021    CO2 29 01/20/2021    BUN 16 01/20/2021    CREATININE 0.4 01/20/2021    GLUCOSE 162 (H) 08/08/2020    CALCIUM 9.1 01/20/2021    PROT 6.9 08/08/2020    LABALBU 3.8 01/20/2021    BILITOT 0.3 01/20/2021    ALKPHOS 92 01/20/2021    AST 15 01/20/2021    ALT 12 01/20/2021    LABGLOM >60 08/08/2020    GFRAA >60 08/08/2020     Lab Results   Component Value Date    CHOL 184 08/08/2020    CHOL 235 (H) 12/14/2018    CHOL 282 (H) 02/10/2017     Lab Results   Component Value Date    TRIG 71 08/08/2020    TRIG 159 (H) 12/14/2018    TRIG 361 (H) 02/10/2017     Lab Results   Component Value Date    HDL 60 08/08/2020    HDL 71 12/14/2018    HDL 65 02/10/2017     Lab Results   Component Value Date    LDLCHOLESTEROL 110 08/08/2020    LDLCHOLESTEROL 132 (H) 12/14/2018    LDLCHOLESTEROL 145 (H) 02/10/2017     Lab Results   Component Value Date    VLDL NOT REPORTED 08/08/2020    VLDL NOT REPORTED 12/14/2018    VLDL 72 (H) 02/10/2017     Lab Results   Component Value Date    CHOLHDLRATIO 3.1 08/08/2020    CHOLHDLRATIO 3.3 12/14/2018    CHOLHDLRATIO 4.3 02/10/2017           Physical Exam  Constitutional:       Appearance: He is well-developed. Eyes:      Pupils: Pupils are equal, round, and reactive to light. Cardiovascular:      Rate and Rhythm: Normal rate and regular rhythm. Pulmonary:      Effort: Pulmonary effort is normal.      Breath sounds: Normal breath sounds. Skin:     General: Skin is warm and dry. Findings: No lesion (no lipohypertrophy) or rash. Neurological:      Mental Status: He is alert and oriented to person, place, and time. Sensory: No sensory deficit. Psychiatric:         Speech: Speech normal.         Behavior: Behavior normal.         Thought Content: Thought content normal.         Judgment: Judgment normal.           ASSESSMENT/PLAN:     Diagnosis Orders   1. Diabetic polyneuropathy associated with type 1 diabetes mellitus (HCC)  Continuous Blood Gluc  (DEXCOM G6 ) JARED    Continuous Blood Gluc Sensor (DEXCOM G6 SENSOR) MISC    Continuous Blood Gluc Transmit (DEXCOM G6 TRANSMITTER) MISC   2. Diabetes education, encounter for     3.  Mixed hyperlipidemia       No orders of the defined types were placed in this encounter. Orders Placed This Encounter   Medications    Continuous Blood Gluc  (DEXCOM G6 ) JARED     Si Units by Does not apply route daily     Dispense:  1 Device     Refill:  0    Continuous Blood Gluc Sensor (DEXCOM G6 SENSOR) MISC     Sig: Use once sensor every 10 days     Dispense:  3 each     Refill:  3    Continuous Blood Gluc Transmit (DEXCOM G6 TRANSMITTER) MISC     Sig: Use one transmitter every 3 months     Dispense:  1 each     Refill:  3     Requested Prescriptions     Signed Prescriptions Disp Refills    Continuous Blood Gluc  (DEXCOM G6 ) JARED 1 Device 0     Si Units by Does not apply route daily    Continuous Blood Gluc Sensor (DEXCOM G6 SENSOR) MISC 3 each 3     Sig: Use once sensor every 10 days    Continuous Blood Gluc Transmit (DEXCOM G6 TRANSMITTER) MISC 1 each 3     Sig: Use one transmitter every 3 months       1. Diabetic polyneuropathy associated with type 1 diabetes mellitus ([facility] [address]  2. Diabetes education, encounter for  - Unstable  HbA1C goal is less than 7% and blood sugars show no change. - Fasting blood glucose goal is 70-130mg/dl and postprandial blood sugar goal is less than 180 mg/dl. -Diabetic foot exam up-to-date: Yes follows with podiatry  -Diabetic retinal exam up-to-date: No  - Labs reviewed includes: Most recent A1C 12.5%, Microalb/Crt. Ratio 29 mcg/mg creat and GFR >60 mL/min. Repeat labs due in 3 months.    -We discussed in great detail dietary modifications they can make to better improve their blood sugars. --Initial diabetic education completed. Discussed diabetes as a disease and how we can manage it to prevent complications associated with it. Insulin pump settings downloaded and reviewed. Scanned into media tab. -no medication changes at this time will start CGM therapy  -Patient will work on insulin delivery compliance. And not go any days without taking insulin.     Insulin Instructions  Pump Settings insulin aspart 100 UNIT/ML injection vial (NOVOLOG)   Last edited by BEATRIS Coppola CNP on 3/3/2021 at 6:10 PM      Basal Rate   Total Basal Dose: 33.6 units/day   Time units/hr   12:00 AM 1.4      Blood Glucose Target   Time mg/dL   12:00  - 120      Sensitivity Factor   Time mg/dL/unit   12:00 AM 35      Carb Ratio   Time g/unit   12:00 AM 7           Discussed signs and symptoms of hyper/hypoglycemia and how to treat. Encouraged 150 minutes of physical activity per week. Follow a low carbohydrate diet. Encouraged at least 7 hours of sleep. The patient was informed of the goals of diabetes management. This can only be accomplished by watching their diet and exercise levels. We certainly use medicines to help attain these goals. The consequences of not controlling blood sugars were discussed. These include blindness, heart disease, stroke, kidney disease, and possibly need for dialysis. They were told to be careful with their foot care as diabetics often have nerve damage, infections and risk for limb amutations . They also need a dilated eye exam yearly. We discussed the issues of diet, exercise, medication, complication avoidance, reviewed the signs and symptoms of diabetes, hypoglycemic episodes, significance of HbA1C.         3. Mixed hyperlipidemia  stable, lipid panel reviewed, continue current medications. Diet and exercise          Answered all patient questions. Agrees to follow plan of care and to follow up in 1 months, sooner if needed. Call office if unexplained blood sugars less than 70 occur or above 400. Call office or access MyChart with any further questions or concerns. Be sure to bring glucometer/food log at next appointment. Total time spent reviewing chart, labs, counseling patient and documenting on the date of the encounter: 60 minutes.      Electronically signed by BEATRIS Coppola CNP on 3/3/2021 at 6:19 PM      (Please note that portions of this note were completed with a voice-recognition program. Efforts were made to edit the dictation but occasionally words are mis-transcribed.)

## 2021-03-07 DIAGNOSIS — G89.29 OTHER CHRONIC PAIN: ICD-10-CM

## 2021-03-07 DIAGNOSIS — E10.51 TYPE 1 DIABETES MELLITUS WITH PERIPHERAL ANGIOPATHY WITHOUT GANGRENE (HCC): ICD-10-CM

## 2021-03-09 RX ORDER — LISINOPRIL 5 MG/1
TABLET ORAL
Qty: 90 TABLET | Refills: 1 | Status: SHIPPED | OUTPATIENT
Start: 2021-03-09 | End: 2021-09-09

## 2021-03-09 RX ORDER — AMITRIPTYLINE HYDROCHLORIDE 75 MG/1
TABLET, FILM COATED ORAL
Qty: 90 TABLET | Refills: 1 | Status: SHIPPED | OUTPATIENT
Start: 2021-03-09 | End: 2021-06-14 | Stop reason: DRUGHIGH

## 2021-03-15 DIAGNOSIS — E10.42 TYPE 1 DIABETES MELLITUS WITH DIABETIC POLYNEUROPATHY (HCC): Primary | ICD-10-CM

## 2021-03-31 ENCOUNTER — OFFICE VISIT (OUTPATIENT)
Dept: DIABETES SERVICES | Age: 36
End: 2021-03-31
Payer: COMMERCIAL

## 2021-03-31 VITALS
HEIGHT: 70 IN | RESPIRATION RATE: 16 BRPM | WEIGHT: 157 LBS | BODY MASS INDEX: 22.48 KG/M2 | DIASTOLIC BLOOD PRESSURE: 70 MMHG | HEART RATE: 96 BPM | SYSTOLIC BLOOD PRESSURE: 124 MMHG

## 2021-03-31 DIAGNOSIS — E10.42 DIABETIC POLYNEUROPATHY ASSOCIATED WITH TYPE 1 DIABETES MELLITUS (HCC): Primary | ICD-10-CM

## 2021-03-31 DIAGNOSIS — Z71.89 ENCOUNTER FOR GLUCOMETER INSTRUCTION: ICD-10-CM

## 2021-03-31 DIAGNOSIS — Z71.89 DIABETES EDUCATION, ENCOUNTER FOR: ICD-10-CM

## 2021-03-31 PROCEDURE — G8427 DOCREV CUR MEDS BY ELIG CLIN: HCPCS | Performed by: NURSE PRACTITIONER

## 2021-03-31 PROCEDURE — G8482 FLU IMMUNIZE ORDER/ADMIN: HCPCS | Performed by: NURSE PRACTITIONER

## 2021-03-31 PROCEDURE — 99212 OFFICE O/P EST SF 10 MIN: CPT

## 2021-03-31 PROCEDURE — 99214 OFFICE O/P EST MOD 30 MIN: CPT | Performed by: NURSE PRACTITIONER

## 2021-03-31 PROCEDURE — 3046F HEMOGLOBIN A1C LEVEL >9.0%: CPT | Performed by: NURSE PRACTITIONER

## 2021-03-31 PROCEDURE — 4004F PT TOBACCO SCREEN RCVD TLK: CPT | Performed by: NURSE PRACTITIONER

## 2021-03-31 PROCEDURE — 95250 CONT GLUC MNTR PHYS/QHP EQP: CPT | Performed by: NURSE PRACTITIONER

## 2021-03-31 PROCEDURE — G8420 CALC BMI NORM PARAMETERS: HCPCS | Performed by: NURSE PRACTITIONER

## 2021-03-31 PROCEDURE — 2022F DILAT RTA XM EVC RTNOPTHY: CPT | Performed by: NURSE PRACTITIONER

## 2021-03-31 ASSESSMENT — ENCOUNTER SYMPTOMS
DIARRHEA: 0
ABDOMINAL PAIN: 0
SHORTNESS OF BREATH: 0
RESPIRATORY NEGATIVE: 1

## 2021-03-31 NOTE — PROGRESS NOTES
[address], [address]  [facility] [address]  [phone]        HISTORY:    Cassy Acevedo presents today for evaluation and management of:  Chief Complaint   Patient presents with    Diabetes     1 month- Dexcom application       HPI    Patient is here for initial CGM start up. Patient receives supplies from [email or web address]. All supplies are present. Last diabetic management visit on 3/3/21. CGM recommended for frequent insulin adjustments, compliance and simplification of diabetes management. This patient is interested and full capable to properly function a CGM. he is taking 3 or more insulin injections a day and is testing blood sugars 4 or more times per day. No other issues need to be discussed currently. Current Diabetic Medications  novolog for use with omnipod. Past Medical History:   Diagnosis Date    Asthma     Diabetes mellitus ([address]     type 1    Diabetic ketoacidosis associated with type 1 diabetes mellitus ([address] 04/28/2017    Head injury     Hyperlipidemia     Hypertension     Male erectile dysfunction     Migraine     Neuropathy in diabetes ([facility] [address]      Family History   Problem Relation Age of Onset    Diabetes Mother         type 1    Heart Disease Mother     Kidney Disease Mother     High Cholesterol Mother     High Blood Pressure Mother     High Blood Pressure Father     Diabetes Father         type 2    Heart Disease Father      Social History     Tobacco Use    Smoking status: Current Some Day Smoker     Packs/day: 0.20     Types: Cigarettes    Smokeless tobacco: Never Used    Tobacco comment: ECLAMB RRT 7/19/17   Substance Use Topics    Alcohol use:  Yes     Alcohol/week: 18.0 standard drinks     Types: 18 Cans of beer per week     Comment: has 18 beers in a weekend    Drug use: Never     Allergies   Allergen Reactions    Bee Venom Hives     With swelling    Vancomycin Other (See Comments)     From [facility] care plan - reaction unknown.  Lipitor [Atorvastatin] Hives       MEDICATIONS:  Current Outpatient Medications   Medication Sig Dispense Refill    amitriptyline (ELAVIL) 75 MG tablet take 1 tablet by mouth at bedtime 90 tablet 1    lisinopril (PRINIVIL;ZESTRIL) 5 MG tablet take 1 tablet by mouth once daily 90 tablet 1    gabapentin (NEURONTIN) 400 MG capsule Take 1 capsule by mouth 3 times daily for 30 days. Take 1 capsule by mouth TID x 1 week, then increase to 1 cap qam, 1 cap qafternoon, and 2 caps at bedtime. 120 capsule 1    simvastatin (ZOCOR) 20 MG tablet Take 1 tablet by mouth every evening 90 tablet 1    EPINEPHrine (EPIPEN 2-KARY) 0.3 MG/0.3ML SOAJ injection Use as directed for allergic reaction 1 each 1    insulin aspart (NOVOLOG) 100 UNIT/ML injection vial Use 200 units with Omnipod pump every 3 days. 6 vial 1    blood glucose test strips (CONTOUR NEXT TEST) strip Use to test blood glucose 4 times daily and as needed due to fluctuating blood sugars and sliding scale insulin use. 400 strip 3    Insulin Disposable Pump (OMNIPOD DASH 5 PACK PODS) MISC Inject 1 each as directed every 3 days 200 units of humalog in each pod last 3 days      Lancets MISC Indications: Diabetes Patient is testing 4 times daily due to fluctuating blood sugars and sliding scale use. 100 each 11    Insulin Pen Needle (PEN NEEDLES) 32G X 5 MM MISC Use with insulin pen TID with meals.  90 each 5    Insulin Pump - Insulin regular Inject into the skin continuous Running with humalog in the pump - works with calorie count      Blood Glucose Monitoring Suppl JARED 1 kit by Does not apply route daily Indications: Diabetes, Type 1 1 Device 0    Continuous Blood Gluc  (DEXCOM G6 ) JARED 1 Units by Does not apply route daily (Patient not taking: Reported on 3/31/2021) 1 Device 0    Continuous Blood Gluc Sensor (DEXCOM G6 SENSOR) MISC Use once sensor every 10 days (Patient not taking: Reported on 3/31/2021) 3 each 3    Continuous Blood Gluc Transmit (DEXCOM G6 TRANSMITTER) MISC Use one transmitter every 3 months (Patient not taking: Reported on 3/31/2021) 1 each 3     No current facility-administered medications for this visit. Review ofSymptoms:  Review of Systems   Constitutional: Positive for fatigue. Negative for unexpected weight change. Eyes: Negative for visual disturbance. Respiratory: Negative. Negative for shortness of breath. Cardiovascular: Negative for chest pain and leg swelling. Gastrointestinal: Negative for abdominal pain and diarrhea. Endocrine: Negative for polydipsia, polyphagia and polyuria. Genitourinary: Negative. Musculoskeletal: Negative. Skin: Negative for rash and wound. Neurological: Negative for dizziness, tremors, seizures and headaches. Psychiatric/Behavioral: Negative. Negative for confusion and decreased concentration. Theremainder of a complete 14-point review of systems is negative. Vital Signs: /70 (Site: Left Upper Arm, Position: Sitting, Cuff Size: Medium Adult)   Pulse 96   Resp 16   Ht 5' 10\" (1.778 m)   Wt 157 lb (71.2 kg)   BMI 22.53 kg/m²      Wt Readings from Last 3 Encounters:   03/31/21 157 lb (71.2 kg)   03/03/21 165 lb (74.8 kg)   03/02/21 168 lb (76.2 kg)     Body mass index is 22.53 kg/m².   LABS:  Hemoglobin A1C   Date Value Ref Range Status   03/02/2021 12.5 (H) 4.0 - 6.0 % Final   09/06/2019 13.8 % Final     Lab Results   Component Value Date    LABMICR 29 (H) 08/08/2020     Lab Results   Component Value Date     01/20/2021    K 3.8 01/20/2021     01/20/2021    CO2 29 01/20/2021    BUN 16 01/20/2021    CREATININE 0.4 01/20/2021    GLUCOSE 162 (H) 08/08/2020    CALCIUM 9.1 01/20/2021    PROT 6.9 08/08/2020    LABALBU 3.8 01/20/2021    BILITOT 0.3 01/20/2021    ALKPHOS 92 01/20/2021    AST 15 01/20/2021    ALT 12 01/20/2021    LABGLOM >60 08/08/2020 GFRAA >60 08/08/2020     Lab Results   Component Value Date    CHOL 184 08/08/2020    CHOL 235 (H) 12/14/2018    CHOL 282 (H) 02/10/2017     Lab Results   Component Value Date    TRIG 71 08/08/2020    TRIG 159 (H) 12/14/2018    TRIG 361 (H) 02/10/2017     Lab Results   Component Value Date    HDL 60 08/08/2020    HDL 71 12/14/2018    HDL 65 02/10/2017     Lab Results   Component Value Date    LDLCHOLESTEROL 110 08/08/2020    LDLCHOLESTEROL 132 (H) 12/14/2018    LDLCHOLESTEROL 145 (H) 02/10/2017     Lab Results   Component Value Date    VLDL NOT REPORTED 08/08/2020    VLDL NOT REPORTED 12/14/2018    VLDL 72 (H) 02/10/2017     Lab Results   Component Value Date    CHOLHDLRATIO 3.1 08/08/2020    CHOLHDLRATIO 3.3 12/14/2018    CHOLHDLRATIO 4.3 02/10/2017           Physical Exam  Constitutional:       Appearance: He is well-developed. Eyes:      Pupils: Pupils are equal, round, and reactive to light. Cardiovascular:      Rate and Rhythm: Normal rate and regular rhythm. Pulmonary:      Effort: Pulmonary effort is normal.      Breath sounds: Normal breath sounds. Skin:     General: Skin is warm and dry. Findings: No lesion (no lipohypertrophy) or rash. Neurological:      Mental Status: He is alert and oriented to person, place, and time. Sensory: No sensory deficit. Psychiatric:         Speech: Speech normal.         Behavior: Behavior normal.         Thought Content: Thought content normal.         Judgment: Judgment normal.           ASSESSMENT/PLAN:     Diagnosis Orders   1. Diabetic polyneuropathy associated with type 1 diabetes mellitus ([facility]     2. Diabetes education, encounter for     3. Encounter for glucometer instruction       No orders of the defined types were placed in this encounter. No orders of the defined types were placed in this encounter. Requested Prescriptions      No prescriptions requested or ordered in this encounter       1.  Diabetic polyneuropathy associated with type 1 diabetes mellitus ([facility] [address]  2. Diabetes education, encounter for  3. Encounter for glucometer instruction      -Education regarding CGM was provided. Quick reference guide reviewed with patient. Patient successfully applied CGM Sensor. CGM reader was set up. CGM reader screen settings reviewed with patient. All questions were answered and patient will follow-up with any further questions. follow up in 4 weeks. Patient will call with any questions and use fingerstick as backup if necessary. Education regarding removal of sensor or any imaging given to patient. Discussed with patient the importance of performing fingerstick glucose if CGM sensor warrants. Instructed patient to perform fingerstick glucose if symptoms do not match greater glucose. Informed patient to call company if sensor falls of prior to 2 weeks or is malfunctioning. Call DME supplier for refills as needed. All  educational material for CGM reviewed and sent with patient  -he will call omnipod for pdm upgrade so we can connect dexcom. His phone is not compatible with the braulio. Answered all patient questions. Agrees to follow plan of care and to follow up in 1 months, sooner if needed. Call office if unexplained blood sugars less than 70 occur or above 400. Call office or access in2apps with any further questions or concerns. Be sure to bring glucometer/food log at next appointment. Patient was seen with total face to face time of 30 minutes. minutes. More than 50%  of this visit was counseling and education regarding his diabetes.     Electronically signed by BEATRIS Riggins CNP on 3/31/2021 at 5:13 PM      (Please note that portions of this note were completed with a voice-recognition program. Efforts were made to edit the dictation but occasionally words are mis-transcribed.)

## 2021-04-20 ENCOUNTER — TELEPHONE (OUTPATIENT)
Dept: INTERNAL MEDICINE | Age: 36
End: 2021-04-20

## 2021-04-20 NOTE — TELEPHONE ENCOUNTER
GLORIA:  Called patient, stated that his dexcom ripped off by his shirt right after he put the new one on. Gave number to dexcom support to inquire about a new replacement. [phone]. Notified patient that if he can not get new one through them to call back and let us know.

## 2021-05-04 DIAGNOSIS — E10.42 TYPE 1 DIABETES MELLITUS WITH DIABETIC POLYNEUROPATHY (HCC): ICD-10-CM

## 2021-05-04 NOTE — TELEPHONE ENCOUNTER
Next appt 6-14-21. Gabapentin last filled April 1 per [facility].

## 2021-05-05 ENCOUNTER — OFFICE VISIT (OUTPATIENT)
Dept: DIABETES SERVICES | Age: 36
End: 2021-05-05
Payer: COMMERCIAL

## 2021-05-05 VITALS
BODY MASS INDEX: 26.63 KG/M2 | HEIGHT: 70 IN | RESPIRATION RATE: 16 BRPM | DIASTOLIC BLOOD PRESSURE: 88 MMHG | HEART RATE: 96 BPM | WEIGHT: 186 LBS | SYSTOLIC BLOOD PRESSURE: 138 MMHG

## 2021-05-05 DIAGNOSIS — E78.2 MIXED HYPERLIPIDEMIA: ICD-10-CM

## 2021-05-05 DIAGNOSIS — E10.42 DIABETIC POLYNEUROPATHY ASSOCIATED WITH TYPE 1 DIABETES MELLITUS (HCC): Primary | ICD-10-CM

## 2021-05-05 DIAGNOSIS — I10 ESSENTIAL HYPERTENSION: ICD-10-CM

## 2021-05-05 PROCEDURE — 3046F HEMOGLOBIN A1C LEVEL >9.0%: CPT | Performed by: NURSE PRACTITIONER

## 2021-05-05 PROCEDURE — G8419 CALC BMI OUT NRM PARAM NOF/U: HCPCS | Performed by: NURSE PRACTITIONER

## 2021-05-05 PROCEDURE — G8427 DOCREV CUR MEDS BY ELIG CLIN: HCPCS | Performed by: NURSE PRACTITIONER

## 2021-05-05 PROCEDURE — 95251 CONT GLUC MNTR ANALYSIS I&R: CPT | Performed by: NURSE PRACTITIONER

## 2021-05-05 PROCEDURE — 2022F DILAT RTA XM EVC RTNOPTHY: CPT | Performed by: NURSE PRACTITIONER

## 2021-05-05 PROCEDURE — 99212 OFFICE O/P EST SF 10 MIN: CPT

## 2021-05-05 PROCEDURE — 4004F PT TOBACCO SCREEN RCVD TLK: CPT | Performed by: NURSE PRACTITIONER

## 2021-05-05 PROCEDURE — 99214 OFFICE O/P EST MOD 30 MIN: CPT | Performed by: NURSE PRACTITIONER

## 2021-05-05 RX ORDER — GABAPENTIN 400 MG/1
CAPSULE ORAL
Qty: 120 CAPSULE | Refills: 1 | Status: SHIPPED | OUTPATIENT
Start: 2021-05-05 | End: 2021-07-23

## 2021-05-05 NOTE — TELEPHONE ENCOUNTER
Controlled Substance Monitoring:    Acute and Chronic Pain Monitoring:   RX Monitoring 5/5/2021   Attestation -   Periodic Controlled Substance Monitoring No signs of potential drug abuse or diversion identified.     -    -

## 2021-05-05 NOTE — PATIENT INSTRUCTIONS
For back up insulin injections take 20 units of long acting and   7 units with each meal or use sliding scale for short acting insulin   Sliding Scale Insulin    Blood sugar Action     <70  Drink Juice      No extra insulin  150 - 200 2 units subcutaneous Insulin  201 - 250 4 units subcutaneous Insulin  251 - 300 6 units subcutaneous Insulin  301 - 350 8 units subcutaneous Insulin  351 - 400 10 units subcutaneous Insulin   > 400  12 units subcutaneous Insulin    Glucose tabs for low blood sugars.

## 2021-05-17 ENCOUNTER — OFFICE VISIT (OUTPATIENT)
Dept: UROLOGY | Age: 36
End: 2021-05-17
Payer: COMMERCIAL

## 2021-05-17 VITALS
SYSTOLIC BLOOD PRESSURE: 158 MMHG | WEIGHT: 181 LBS | DIASTOLIC BLOOD PRESSURE: 92 MMHG | HEART RATE: 110 BPM | RESPIRATION RATE: 12 BRPM | HEIGHT: 70 IN | BODY MASS INDEX: 25.91 KG/M2 | OXYGEN SATURATION: 99 %

## 2021-05-17 DIAGNOSIS — N52.9 ERECTILE DYSFUNCTION, UNSPECIFIED ERECTILE DYSFUNCTION TYPE: Primary | ICD-10-CM

## 2021-05-17 PROCEDURE — G8419 CALC BMI OUT NRM PARAM NOF/U: HCPCS | Performed by: UROLOGY

## 2021-05-17 PROCEDURE — 4004F PT TOBACCO SCREEN RCVD TLK: CPT | Performed by: UROLOGY

## 2021-05-17 PROCEDURE — 99214 OFFICE O/P EST MOD 30 MIN: CPT | Performed by: UROLOGY

## 2021-05-17 PROCEDURE — G8427 DOCREV CUR MEDS BY ELIG CLIN: HCPCS | Performed by: UROLOGY

## 2021-05-17 RX ORDER — SILDENAFIL 100 MG/1
100 TABLET, FILM COATED ORAL DAILY PRN
Qty: 30 TABLET | Refills: 3 | Status: SHIPPED | OUTPATIENT
Start: 2021-05-17 | End: 2022-06-15 | Stop reason: ALTCHOICE

## 2021-05-17 NOTE — PROGRESS NOTES
BRODY Cavazos MD        [address]  [address] [place] [address]  Dept: [phone]  Dept Fax: [phone]  Loc: [facility],[address] Urology Office Note - New Patient    Patient:  Darcey Kehr  YOB: 1985  Date: 5/17/2021    The patient is a 39 y.o. male who presents today for evaluation of the following problems:   Chief Complaint   Patient presents with    New Patient    Erectile Dysfunction    referred/consultation requested by Shira Newton DO.    HISTORY OF PRESENT ILLNESS:     ED  Several years onset  Type I diabetic- sugars controlled on pump  Cannot get erection         Requested/reviewed records from Shira Newton DO office and/or outside physician/EMR    (Patient's old records have been requested, reviewed and pertinent findings summarized in today's note.)    Procedures Today: N/A      Last several PSA's:  Lab Results   Component Value Date    PSA 0.67 02/01/2019       Last total testosterone:  Lab Results   Component Value Date    TESTOSTERONE 454 02/01/2019       Urinalysis today:  No results found for this visit on 05/17/21. Last BUN and creatinine:  Lab Results   Component Value Date    BUN 16 01/20/2021     Lab Results   Component Value Date    CREATININE 0.4 01/20/2021       Additional Lab/Culture results: none    Imaging Reviewed during this Office Visit:   Alla Jackson MD independently reviewed the images and verified the radiology reports from:    No results found.     PAST MEDICAL, FAMILY AND SOCIAL HISTORY:  Past Medical History:   Diagnosis Date    Asthma     Diabetes mellitus ([address]     type 1    Diabetic ketoacidosis associated with type 1 diabetes mellitus ([facility] [address] 04/28/2017    Head injury     Hyperlipidemia     Hypertension     Male erectile dysfunction     Migraine     Neuropathy in diabetes [facility])      Past Surgical History:   Procedure Laterality Date    APPENDECTOMY      TYMPANOSTOMY TUBE PLACEMENT       Family History   Problem Relation Age of Onset    Diabetes Mother         type 1    Heart Disease Mother     Kidney Disease Mother     High Cholesterol Mother     High Blood Pressure Mother     High Blood Pressure Father     Diabetes Father         type 2    Heart Disease Father      Outpatient Medications Marked as Taking for the 5/17/21 encounter (Office Visit) with Girish Quiñones MD   Medication Sig Dispense Refill    gabapentin (NEURONTIN) 400 MG capsule Take 1 capsule by mouth qam, 1 capsule qafternoon, and 2 caps qhs. 120 capsule 1    Insulin Degludec 100 UNIT/ML SOPN Inject 20 Units into the skin daily For use when pump not in place 1 pen 0    amitriptyline (ELAVIL) 75 MG tablet take 1 tablet by mouth at bedtime 90 tablet 1    lisinopril (PRINIVIL;ZESTRIL) 5 MG tablet take 1 tablet by mouth once daily 90 tablet 1    Continuous Blood Gluc  (DEXCOM G6 ) JARED 1 Units by Does not apply route daily 1 Device 0    Continuous Blood Gluc Sensor (DEXCOM G6 SENSOR) MISC Use once sensor every 10 days 3 each 3    Continuous Blood Gluc Transmit (DEXCOM G6 TRANSMITTER) MISC Use one transmitter every 3 months 1 each 3    simvastatin (ZOCOR) 20 MG tablet Take 1 tablet by mouth every evening 90 tablet 1    insulin aspart (NOVOLOG) 100 UNIT/ML injection vial Use 200 units with Omnipod pump every 3 days. 6 vial 1    Insulin Disposable Pump (OMNIPOD DASH 5 PACK PODS) MISC Inject 1 each as directed every 3 days 200 units of humalog in each pod last 3 days      Insulin Pen Needle (PEN NEEDLES) 32G X 5 MM MISC Use with insulin pen TID with meals.  90 each 5    Insulin Pump - Insulin regular Inject into the skin continuous Running with humalog in the pump - works with calorie count         Bee venom, Vancomycin, and Lipitor [atorvastatin]  Social History     Tobacco Use   Smoking Status Current Some Day Smoker    Packs/day: 0.20    Years: 16.00    Pack years: 3.20    Types: Cigarettes    Start date: 1/1/2005   Smokeless Tobacco Never Used   Tobacco Comment    ECLAMB RRT 7/19/17      (If patient a smoker, smoking cessation counseling offered)   Social History     Substance and Sexual Activity   Alcohol Use Yes    Alcohol/week: 18.0 standard drinks    Types: 18 Cans of beer per week    Comment: has 18 beers in a weekend       REVIEW OF SYSTEMS:  Constitutional: negative  Eyes: negative  Respiratory: negative  Cardiovascular: negative  Gastrointestinal: negative  Genitourinary: see HPI  Musculoskeletal: negative  Skin: negative   Neurological: negative  Hematological/Lymphatic: negative  Psychological: negative      Physical Exam:    This a 39 y.o. male  Vitals:    05/17/21 1040   BP: (!) 158/92   Pulse:    Resp:    SpO2:      Body mass index is 25.97 kg/m². Constitutional: Patient in no acute distress;       Assessment and Plan        1. Erectile dysfunction, unspecified erectile dysfunction type               Plan:      ED likely secondary to diabetes type I  Will start sildenafil 100 mg. Needs goodrx  Follow up in 4-6 weeks for med check      Prescriptions Ordered:  No orders of the defined types were placed in this encounter. Orders Placed:  No orders of the defined types were placed in this encounter.            Devin Bran MD

## 2021-06-14 ENCOUNTER — TELEPHONE (OUTPATIENT)
Dept: FAMILY MEDICINE CLINIC | Age: 36
End: 2021-06-14

## 2021-06-14 ENCOUNTER — OFFICE VISIT (OUTPATIENT)
Dept: FAMILY MEDICINE CLINIC | Age: 36
End: 2021-06-14
Payer: COMMERCIAL

## 2021-06-14 ENCOUNTER — HOSPITAL ENCOUNTER (OUTPATIENT)
Dept: LAB | Age: 36
Discharge: HOME | End: 2021-06-14
Payer: COMMERCIAL

## 2021-06-14 ENCOUNTER — HOSPITAL ENCOUNTER (OUTPATIENT)
Dept: GENERAL RADIOLOGY | Age: 36
Discharge: HOME | End: 2021-06-16
Payer: COMMERCIAL

## 2021-06-14 VITALS
HEIGHT: 70 IN | HEART RATE: 98 BPM | WEIGHT: 182 LBS | SYSTOLIC BLOOD PRESSURE: 136 MMHG | BODY MASS INDEX: 26.05 KG/M2 | OXYGEN SATURATION: 98 % | DIASTOLIC BLOOD PRESSURE: 82 MMHG

## 2021-06-14 DIAGNOSIS — E11.40 CHRONIC PAINFUL DIABETIC NEUROPATHY (HCC): ICD-10-CM

## 2021-06-14 DIAGNOSIS — E10.51 TYPE 1 DIABETES MELLITUS WITH PERIPHERAL ANGIOPATHY WITHOUT GANGRENE (HCC): Primary | ICD-10-CM

## 2021-06-14 DIAGNOSIS — E10.42 TYPE 1 DIABETES MELLITUS WITH DIABETIC POLYNEUROPATHY (HCC): ICD-10-CM

## 2021-06-14 DIAGNOSIS — M25.511 ACUTE PAIN OF RIGHT SHOULDER: ICD-10-CM

## 2021-06-14 PROCEDURE — 73030 X-RAY EXAM OF SHOULDER: CPT

## 2021-06-14 PROCEDURE — 36415 COLL VENOUS BLD VENIPUNCTURE: CPT

## 2021-06-14 PROCEDURE — 2022F DILAT RTA XM EVC RTNOPTHY: CPT | Performed by: FAMILY MEDICINE

## 2021-06-14 PROCEDURE — 83036 HEMOGLOBIN GLYCOSYLATED A1C: CPT

## 2021-06-14 PROCEDURE — 99214 OFFICE O/P EST MOD 30 MIN: CPT | Performed by: FAMILY MEDICINE

## 2021-06-14 PROCEDURE — 99212 OFFICE O/P EST SF 10 MIN: CPT | Performed by: FAMILY MEDICINE

## 2021-06-14 PROCEDURE — 3051F HG A1C>EQUAL 7.0%<8.0%: CPT | Performed by: FAMILY MEDICINE

## 2021-06-14 PROCEDURE — G8419 CALC BMI OUT NRM PARAM NOF/U: HCPCS | Performed by: FAMILY MEDICINE

## 2021-06-14 PROCEDURE — 4004F PT TOBACCO SCREEN RCVD TLK: CPT | Performed by: FAMILY MEDICINE

## 2021-06-14 PROCEDURE — G8427 DOCREV CUR MEDS BY ELIG CLIN: HCPCS | Performed by: FAMILY MEDICINE

## 2021-06-14 RX ORDER — AMITRIPTYLINE HYDROCHLORIDE 100 MG/1
100 TABLET, FILM COATED ORAL NIGHTLY
Qty: 90 TABLET | Refills: 0 | Status: SHIPPED | OUTPATIENT
Start: 2021-06-14 | End: 2021-09-09

## 2021-06-14 NOTE — PROGRESS NOTES
[address]  [address], [address]  [phone]      Elyssa Castillo is a 39 y.o. male who presents today for his medical conditions/complaints as noted below. Elyssa Castillo is c/o of Diabetes      HPI:     Pt here today for follow-up of DM. Pt has Dexcom CGM - doing well with this. Had noticed that he was dropping every day at work due to how hard he exerts himself. Has started snacking more often to keep his glucose higher - has one when he gets to work, small snack instead of lunch, and another time after lunch; usually peanut butter crackers. Drinks Gatorade or one Diet soda during work. Lowest reading recently ~72. Still using Omnipod - puts in 200 units every 3 days. Only had one episode where he was out and had to use back-up Novolog pens. Can use his calculator to still figure out what dose he needs. Last A1c was 12.5% on 3/2/21 - will get re-checked today. Taking Gabapentin   Having more trouble at night usually; sometimes has to take his two 400 mg capsules early, as his pain is already worse before bedtime. 1 cap qam, 1 cap qafternoon, and 2 caps at bedtime. Tried Lyrica and Cymbalta in the past, but insurance stopped covering; Has tried Viagra 100 mg since last Urology visit on 5/17 - does not seem to work at all.           Past Medical History:   Diagnosis Date    Asthma     Diabetes mellitus ([facility] [address]     type 1    Diabetic ketoacidosis associated with type 1 diabetes mellitus ([facility] [address] 04/28/2017    Head injury     Hyperlipidemia     Hypertension     Male erectile dysfunction     Migraine     Neuropathy in diabetes [facility])       Past Surgical History:   Procedure Laterality Date    APPENDECTOMY      TYMPANOSTOMY TUBE PLACEMENT       Family History   Problem Relation Age of Onset    Diabetes Mother         type 1    Heart Disease Mother     Kidney Disease Mother     High Cholesterol Mother     High Blood Pressure Mother     High Blood Pressure Father     Diabetes Father         type 2    Heart Disease Father      Social History     Tobacco Use    Smoking status: Current Some Day Smoker     Packs/day: 0.25     Years: 16.00     Pack years: 4.00     Types: Cigarettes     Start date: 1/1/2005    Smokeless tobacco: Never Used    Tobacco comment: ECLAMB RRT 7/19/17   Substance Use Topics    Alcohol use: Yes     Alcohol/week: 18.0 standard drinks     Types: 18 Cans of beer per week     Comment: has 18 beers in a weekend      Current Outpatient Medications   Medication Sig Dispense Refill    amitriptyline (ELAVIL) 100 MG tablet Take 1 tablet by mouth nightly 90 tablet 0    sildenafil (VIAGRA) 100 MG tablet Take 1 tablet by mouth daily as needed for Erectile Dysfunction 30 tablet 3    gabapentin (NEURONTIN) 400 MG capsule Take 1 capsule by mouth qam, 1 capsule qafternoon, and 2 caps qhs. 120 capsule 1    Insulin Degludec 100 UNIT/ML SOPN Inject 20 Units into the skin daily For use when pump not in place 1 pen 0    lisinopril (PRINIVIL;ZESTRIL) 5 MG tablet take 1 tablet by mouth once daily 90 tablet 1    Continuous Blood Gluc  (DEXCOM G6 ) JARED 1 Units by Does not apply route daily 1 Device 0    Continuous Blood Gluc Sensor (DEXCOM G6 SENSOR) MISC Use once sensor every 10 days 3 each 3    Continuous Blood Gluc Transmit (DEXCOM G6 TRANSMITTER) MISC Use one transmitter every 3 months 1 each 3    simvastatin (ZOCOR) 20 MG tablet Take 1 tablet by mouth every evening 90 tablet 1    EPINEPHrine (EPIPEN 2-KARY) 0.3 MG/0.3ML SOAJ injection Use as directed for allergic reaction 1 each 1    insulin aspart (NOVOLOG) 100 UNIT/ML injection vial Use 200 units with Omnipod pump every 3 days.  6 vial 1    Insulin Disposable Pump (OMNIPOD DASH 5 PACK PODS) MISC Inject 1 each as directed every 3 days 200 units of humalog in each pod last 3 days      Insulin Pen Needle (PEN NEEDLES) 32G X 5 MM MISC Use with insulin pen TID with meals. 90 each 5    Insulin Pump - Insulin regular Inject into the skin continuous Running with humalog in the pump - works with calorie count       No current facility-administered medications for this visit. Allergies   Allergen Reactions    Bee Venom Hives     With swelling    Vancomycin Other (See Comments)     From [facility] care plan - reaction unknown.  Lipitor [Atorvastatin] Hives       Health Maintenance   Topic Date Due    Hepatitis C screen  Never done    Varicella vaccine (1 of 2 - 2-dose childhood series) Never done    COVID-19 Vaccine (1) Never done    Pneumococcal 0-64 years Vaccine (1 of 2 - PPSV23) 12/16/2015    Diabetic retinal exam  11/06/2018    Hepatitis B vaccine (2 of 3 - Risk 3-dose series) 12/18/2021 (Originally 9/25/1998)    Diabetic microalbuminuria test  08/08/2021    Lipid screen  08/08/2021    Potassium monitoring  01/20/2022    Creatinine monitoring  01/20/2022    Diabetic foot exam  03/02/2022    A1C test (Diabetic or Prediabetic)  06/14/2022    DTaP/Tdap/Td vaccine (6 - Td or Tdap) 10/21/2025    Flu vaccine  Completed    HIV screen  Completed    Hepatitis A vaccine  Aged Out    Hib vaccine  Aged Out    Meningococcal (ACWY) vaccine  Aged Out       Subjective:      Review of Systems   Musculoskeletal: Positive for arthralgias (Having more R shoulder pain - worse with lifting or flexion). Objective:     Vitals:    06/14/21 1537   BP: 136/82   Site: Right Upper Arm   Position: Sitting   Cuff Size: Large Adult   Pulse: 98   SpO2: 98%   Weight: 182 lb (82.6 kg)   Height: 5' 10\" (1.778 m)     Physical Exam  Vitals and nursing note reviewed. Constitutional:       General: He is not in acute distress. Appearance: He is well-developed. HENT:      Head: Normocephalic and atraumatic.       Right Ear: Tympanic membrane, ear canal and external ear normal.      Left Ear: Tympanic membrane, ear canal and external ear normal.      Nose: Nose normal.      Mouth/Throat:      Mouth: Mucous membranes are moist.      Pharynx: Oropharynx is clear. No oropharyngeal exudate. Eyes:      Conjunctiva/sclera: Conjunctivae normal.   Cardiovascular:      Rate and Rhythm: Normal rate and regular rhythm. Heart sounds: Normal heart sounds. Pulmonary:      Effort: Pulmonary effort is normal. No respiratory distress. Breath sounds: Normal breath sounds. Abdominal:      General: Bowel sounds are normal. There is no distension. Palpations: Abdomen is soft. Tenderness: There is no abdominal tenderness. Musculoskeletal:      Right shoulder: Tenderness present. No deformity. Decreased range of motion (mild). Skin:     General: Skin is warm and dry. Neurological:      Mental Status: He is alert and oriented to person, place, and time. Assessment:       Diagnosis Orders   1. Type 1 diabetes mellitus with peripheral angiopathy without gangrene ([facility] [address]     2. Chronic painful diabetic neuropathy (HCC)  amitriptyline (ELAVIL) 100 MG tablet   3. Acute pain of right shoulder  XR SHOULDER RIGHT (MIN 2 VIEWS)         Plan:      Return in about 15 weeks (around 9/27/2021) for f/u DM, shoulder pain. Orders Placed This Encounter   Procedures    XR SHOULDER RIGHT (MIN 2 VIEWS)     Standing Status:   Future     Number of Occurrences:   1     Standing Expiration Date:   6/3/2022     Order Specific Question:   Reason for exam:     Answer:   R shoulder pain - worsening; no known injury; worse with flexion and rotation; cannot lie on that side at night     Orders Placed This Encounter   Medications    amitriptyline (ELAVIL) 100 MG tablet     Sig: Take 1 tablet by mouth nightly     Dispense:  90 tablet     Refill:  0       Patient given educational materials - see patient instructions. Discussed use, benefit, and side effects of prescribed medications. All patient questions answered. Pt voiced understanding. Reviewed health maintenance. Electronically signed by Maggie Crouch DO, DO on 6/20/2021 at 11:53 PM

## 2021-06-15 LAB
ESTIMATED AVERAGE GLUCOSE: 160 MG/DL
HBA1C MFR BLD: 7.2 % (ref 4–6)

## 2021-06-21 ENCOUNTER — TELEPHONE (OUTPATIENT)
Dept: FAMILY MEDICINE CLINIC | Age: 36
End: 2021-06-21

## 2021-06-21 ENCOUNTER — OFFICE VISIT (OUTPATIENT)
Dept: UROLOGY | Age: 36
End: 2021-06-21
Payer: COMMERCIAL

## 2021-06-21 VITALS
HEART RATE: 70 BPM | BODY MASS INDEX: 26.05 KG/M2 | DIASTOLIC BLOOD PRESSURE: 85 MMHG | HEIGHT: 70 IN | SYSTOLIC BLOOD PRESSURE: 123 MMHG | WEIGHT: 182 LBS

## 2021-06-21 DIAGNOSIS — M25.511 ACUTE PAIN OF RIGHT SHOULDER: Primary | ICD-10-CM

## 2021-06-21 DIAGNOSIS — E10.51 TYPE 1 DIABETES MELLITUS WITH PERIPHERAL ANGIOPATHY WITHOUT GANGRENE (HCC): Primary | ICD-10-CM

## 2021-06-21 DIAGNOSIS — N52.9 ERECTILE DYSFUNCTION, UNSPECIFIED ERECTILE DYSFUNCTION TYPE: Primary | ICD-10-CM

## 2021-06-21 PROCEDURE — G8427 DOCREV CUR MEDS BY ELIG CLIN: HCPCS | Performed by: UROLOGY

## 2021-06-21 PROCEDURE — 99214 OFFICE O/P EST MOD 30 MIN: CPT | Performed by: UROLOGY

## 2021-06-21 PROCEDURE — G8419 CALC BMI OUT NRM PARAM NOF/U: HCPCS | Performed by: UROLOGY

## 2021-06-21 PROCEDURE — 4004F PT TOBACCO SCREEN RCVD TLK: CPT | Performed by: UROLOGY

## 2021-06-21 PROCEDURE — 99213 OFFICE O/P EST LOW 20 MIN: CPT | Performed by: UROLOGY

## 2021-06-21 RX ORDER — NAPROXEN 500 MG/1
500 TABLET ORAL 2 TIMES DAILY WITH MEALS
Qty: 28 TABLET | Refills: 0 | Status: SHIPPED | OUTPATIENT
Start: 2021-06-21 | End: 2021-10-13 | Stop reason: ALTCHOICE

## 2021-06-21 NOTE — TELEPHONE ENCOUNTER
Call to Alley Donahue to let him know A1c results. States would like to try the naproxen instead of PT for shoulder. Send rx to [facility] in [place].

## 2021-06-21 NOTE — PROGRESS NOTES
Surgical History:   Procedure Laterality Date    APPENDECTOMY      TYMPANOSTOMY TUBE PLACEMENT       Family History   Problem Relation Age of Onset    Diabetes Mother         type 1    Heart Disease Mother     Kidney Disease Mother     High Cholesterol Mother     High Blood Pressure Mother     High Blood Pressure Father     Diabetes Father         type 2    Heart Disease Father      No outpatient medications have been marked as taking for the 6/21/21 encounter (Office Visit) with Nelsy Chopra MD.       Bee venom, Vancomycin, and Lipitor [atorvastatin]  Social History     Tobacco Use   Smoking Status Current Some Day Smoker    Packs/day: 0.25    Years: 16.00    Pack years: 4.00    Types: Cigarettes    Start date: 1/1/2005   Smokeless Tobacco Never Used   Tobacco Comment    ECLAMB RRT 7/19/17      (If patient a smoker, smoking cessation counseling offered)   Social History     Substance and Sexual Activity   Alcohol Use Yes    Alcohol/week: 18.0 standard drinks    Types: 18 Cans of beer per week    Comment: has 18 beers in a weekend       REVIEW OF SYSTEMS:  Constitutional: negative  Eyes: negative  Respiratory: negative  Cardiovascular: negative  Gastrointestinal: negative  Genitourinary: see HPI  Musculoskeletal: negative  Skin: negative   Neurological: negative  Hematological/Lymphatic: negative  Psychological: negative      Physical Exam:    This a 39 y.o. male  Vitals:    06/21/21 1041   BP: 123/85   Pulse: 70     Body mass index is 26.11 kg/m². Constitutional: Patient in no acute distress;       Assessment and Plan        1. Erectile dysfunction, unspecified erectile dysfunction type               Plan:      ED likely secondary to diabetes type I  Sildenafil not effective  Discussed trimix      Follow up for trimix teaching      Prescriptions Ordered:  No orders of the defined types were placed in this encounter. Orders Placed:  No orders of the defined types were placed in this encounter. Devin Bran MD

## 2021-06-21 NOTE — LETTER
[address] Urology A department of [place] [address]  Phone: [phone]  Fax: [phone]    Alla Jackson MD        June 21, 2021     Patient: Darcey Kehr   YOB: 1985   Date of Visit: 6/21/2021       To Whom it May Concern: Darcey Kehr was seen in my clinic on 6/21/2021. He may return to work on 6/22/21. If you have any questions or concerns, please don't hesitate to call.     Sincerely,         Alla Jackson MD

## 2021-06-30 DIAGNOSIS — E10.42 DIABETIC POLYNEUROPATHY ASSOCIATED WITH TYPE 1 DIABETES MELLITUS (HCC): ICD-10-CM

## 2021-06-30 RX ORDER — BLOOD-GLUCOSE TRANSMITTER
EACH MISCELLANEOUS
Qty: 1 EACH | Refills: 3 | Status: SHIPPED | OUTPATIENT
Start: 2021-06-30 | End: 2022-04-05

## 2021-07-07 ENCOUNTER — OFFICE VISIT (OUTPATIENT)
Dept: DIABETES SERVICES | Age: 36
End: 2021-07-07
Payer: COMMERCIAL

## 2021-07-07 VITALS
RESPIRATION RATE: 16 BRPM | HEART RATE: 112 BPM | WEIGHT: 174 LBS | SYSTOLIC BLOOD PRESSURE: 100 MMHG | HEIGHT: 70 IN | BODY MASS INDEX: 24.91 KG/M2 | DIASTOLIC BLOOD PRESSURE: 80 MMHG

## 2021-07-07 DIAGNOSIS — E78.2 MIXED HYPERLIPIDEMIA: ICD-10-CM

## 2021-07-07 DIAGNOSIS — I10 ESSENTIAL HYPERTENSION: ICD-10-CM

## 2021-07-07 DIAGNOSIS — E10.40 TYPE 1 DIABETES MELLITUS WITH DIABETIC NEUROPATHY (HCC): Primary | ICD-10-CM

## 2021-07-07 PROCEDURE — 2022F DILAT RTA XM EVC RTNOPTHY: CPT | Performed by: NURSE PRACTITIONER

## 2021-07-07 PROCEDURE — 4004F PT TOBACCO SCREEN RCVD TLK: CPT | Performed by: NURSE PRACTITIONER

## 2021-07-07 PROCEDURE — G8420 CALC BMI NORM PARAMETERS: HCPCS | Performed by: NURSE PRACTITIONER

## 2021-07-07 PROCEDURE — 99214 OFFICE O/P EST MOD 30 MIN: CPT | Performed by: NURSE PRACTITIONER

## 2021-07-07 PROCEDURE — 3051F HG A1C>EQUAL 7.0%<8.0%: CPT | Performed by: NURSE PRACTITIONER

## 2021-07-07 PROCEDURE — G8427 DOCREV CUR MEDS BY ELIG CLIN: HCPCS | Performed by: NURSE PRACTITIONER

## 2021-07-07 PROCEDURE — 99213 OFFICE O/P EST LOW 20 MIN: CPT

## 2021-07-07 RX ORDER — GLUCAGON INJECTION, SOLUTION 1 MG/.2ML
1 INJECTION, SOLUTION SUBCUTANEOUS PRN
Qty: 2 SYRINGE | Refills: 1 | Status: SHIPPED | OUTPATIENT
Start: 2021-07-07

## 2021-07-07 RX ORDER — INSULIN PUMP CONTROLLER
1 EACH MISCELLANEOUS
Qty: 2 EACH | Refills: 3 | Status: SHIPPED | OUTPATIENT
Start: 2021-07-07 | End: 2021-08-10 | Stop reason: ALTCHOICE

## 2021-07-07 ASSESSMENT — ENCOUNTER SYMPTOMS
ABDOMINAL PAIN: 0
SHORTNESS OF BREATH: 0
DIARRHEA: 0
RESPIRATORY NEGATIVE: 1

## 2021-07-07 NOTE — PROGRESS NOTES
[facility] [address], [address]  [facility] [id number] [phone]        HISTORY:    Marino Neither presents today for evaluation and management of:  Chief Complaint   Patient presents with    Diabetes     2 month appt. HPI    Interval History:    Past DM Medications   none    Current Diabetic Medications  novolog for use with omnipod.      DKA episodes: Multiple episodes of DKA related to not taking insulin and on diagnosis. 21   He was diagnosed in , had flu like symptoms and lost vision and went to the ER started on insulin. Was seen Endo in [place] but does not like the drive has not been seen in over a year. He has had episodes of DKA which he relates to not taking insulin.  He also admits to medication non compliance. He will often forget to replace the omnipod dash system. He was previously on medtronic insulin pump. He feels he does well on the pump is in place. He would like cgm therapy.    Diet: counting carbs  Exercise: none  BS testin times daily denies hypoglycemia.   Issues: denies      -Patient is on an insulin pump and testing blood sugars 4 more times daily and using these blood sugars for frequent insulin adjustments.     21   At previous visit CGM therapy was started. Patient states he was about 1 week without insulin or CGM due to ordering delay from pharmacy. Diet: Counting carbs  Exercise: None physically active during the day  BS testing: Uses CGM daily with success  Issues: Denies    21   He has been out of pump and cgm supplies for a few weeks and using [facility]. He states he can control glucose levels much better while on CGM and pump therapy. Denies any current signs or symptoms of hyper/hypoglycemia. He is due for an eye exam.  He does have paperwork to be filled out so he can have headphones while working so that he can hear his pump and CGM alerts.   He works every 3 days 200 units of humalog in each pod last 3 days 2 each 3    Glucagon (GVOKE HYPOPEN 2-PACK) 1 MG/0.2ML SOAJ Inject 1 mg into the skin as needed (hypoglycemia) Inject 1mg under the skin as needed for low blood sugars. 2 Syringe 1    naproxen (NAPROSYN) 500 MG tablet Take 1 tablet by mouth 2 times daily (with meals) for 14 days 28 tablet 0    amitriptyline (ELAVIL) 100 MG tablet Take 1 tablet by mouth nightly 90 tablet 0    sildenafil (VIAGRA) 100 MG tablet Take 1 tablet by mouth daily as needed for Erectile Dysfunction 30 tablet 3    gabapentin (NEURONTIN) 400 MG capsule Take 1 capsule by mouth qam, 1 capsule qafternoon, and 2 caps qhs. 120 capsule 1    Insulin Degludec 100 UNIT/ML SOPN Inject 20 Units into the skin daily For use when pump not in place 1 pen 0    lisinopril (PRINIVIL;ZESTRIL) 5 MG tablet take 1 tablet by mouth once daily 90 tablet 1    simvastatin (ZOCOR) 20 MG tablet Take 1 tablet by mouth every evening 90 tablet 1    EPINEPHrine (EPIPEN 2-KARY) 0.3 MG/0.3ML SOAJ injection Use as directed for allergic reaction 1 each 1    insulin aspart (NOVOLOG) 100 UNIT/ML injection vial Use 200 units with Omnipod pump every 3 days. 6 vial 1    Insulin Pump - Insulin regular Inject into the skin continuous Running with humalog in the pump - works with calorie count      Continuous Blood Gluc Transmit (DEXCOM G6 TRANSMITTER) MISC Use one transmitter every 3 months 1 each 3    Continuous Blood Gluc  (DEXCOM G6 ) JARED 1 Units by Does not apply route daily 1 Device 0    Continuous Blood Gluc Sensor (DEXCOM G6 SENSOR) MISC Use once sensor every 10 days 3 each 3    Insulin Pen Needle (PEN NEEDLES) 32G X 5 MM MISC Use with insulin pen TID with meals. 90 each 5     No current facility-administered medications for this visit. Review ofSymptoms:  Review of Systems   Constitutional: Positive for fatigue. Negative for unexpected weight change.    Eyes: Negative for visual PACK PODS) MISC 2 each 3     Sig: Inject 1 each as directed every 3 days 200 units of humalog in each pod last 3 days    Glucagon (GVOKE HYPOPEN 2-PACK) 1 MG/0.2ML SOAJ 2 Syringe 1     Sig: Inject 1 mg into the skin as needed (hypoglycemia) Inject 1mg under the skin as needed for low blood sugars. 1. Type 1 diabetes mellitus with diabetic neuropathy (HCC)  - Unstable  HbA1C goal is less than 7%. - Fasting blood glucose goal is 70-130mg/dl and postprandial blood sugar goal is less than 180 mg/dl. -Diabetic foot exam up-to-date: Yes  -Diabetic retinal exam up-to-date: No  - Labs reviewed includes: Most recent A1C 7.2%, Microalb/Crt. Ratio 29 mcg/mg creat and GFR >60 mL/min. Repeat labs due in 3 months.    -We discussed in great detail dietary modifications they can make to better improve their blood sugars. -follow up diabetes education completed, all questions answered.  -Pump supplies reordered. Patient will get back on insulin pump and CGM therapy. Significant improvement from previous A1c. Will work on medication compliance. Reviewed hypoglycemia emergency glucagon kit will be ordered. Paperwork filled out and scanned into media tab to allow headphones to be used while working so is able to hear insulin/CGM alerts. Discussed signs and symptoms of hyper/hypoglycemia and how to treat. Encouraged 150 minutes of physical activity per week. Follow a low carbohydrate diet. Encouraged at least 7 hours of sleep. The patient was informed of the goals of diabetes management. This can only be accomplished by watching their diet and exercise levels. We certainly use medicines to help attain these goals. The consequences of not controlling blood sugars were discussed. These include blindness, heart disease, stroke, kidney disease, and possibly need for dialysis. They were told to be careful with their foot care as diabetics often have nerve damage, infections and risk for limb amutations .  They also need a dilated eye exam yearly. We discussed the issues of diet, exercise, medication, complication avoidance, reviewed the signs and symptoms of diabetes, hypoglycemic episodes, significance of HbA1C.       - Insulin Disposable Pump (OMNIPOD DASH 5 PACK PODS) MISC; Inject 1 each as directed every 3 days 200 units of humalog in each pod last 3 days  Dispense: 2 each; Refill: 3  - Glucagon (GVOKE HYPOPEN 2-PACK) 1 MG/0.2ML SOAJ; Inject 1 mg into the skin as needed (hypoglycemia) Inject 1mg under the skin as needed for low blood sugars. Dispense: 2 Syringe; Refill: 1    2. Mixed hyperlipidemia  stable, lipid panel reviewed, continue current medications. Diet and exercise      3. Essential hypertension   stable, continue current medications. Diet and exercise Seek emergent care if chest pain develops. Answered all patient questions. Agrees to follow plan of care and to follow up in 3 months, sooner if needed. Call office if unexplained blood sugars less than 70 occur or above 400. Call office or access MyChart with any further questions or concerns. Be sure to bring glucometer/food log at next appointment. Total time spent reviewing chart, labs, counseling patient and documenting on the date of the encounter: 30 min.       Electronically signed by BEATRIS Francisco CNP on 7/7/2021 at 5:00 PM      (Please note that portions of this note were completed with a voice-recognition program. Efforts were made to edit the dictation but occasionally words are mis-transcribed.)

## 2021-07-23 DIAGNOSIS — E10.42 TYPE 1 DIABETES MELLITUS WITH DIABETIC POLYNEUROPATHY (HCC): ICD-10-CM

## 2021-07-23 RX ORDER — GABAPENTIN 400 MG/1
CAPSULE ORAL
Qty: 120 CAPSULE | Refills: 1 | Status: SHIPPED | OUTPATIENT
Start: 2021-07-23 | End: 2021-11-30

## 2021-07-23 NOTE — TELEPHONE ENCOUNTER
Johnson Boon called requesting a refill of the below medication which has been pended for you:     Requested Prescriptions     Pending Prescriptions Disp Refills    gabapentin (NEURONTIN) 400 MG capsule [Pharmacy Med Name: GABAPENTIN 400 MG CAPSULE] 120 capsule 1     Sig: take 1 capsule by mouth every morning then 1 capsule by mouth EVERY AFTERNOON then 2 capsules by mouth at bedtime       Last Appointment Date: 6/14/2021  Next Appointment Date: 9/29/2021    Allergies   Allergen Reactions    Bee Venom Hives     With swelling    Vancomycin Other (See Comments)     From [facility] care plan - reaction unknown.      Lipitor [Atorvastatin] Hives

## 2021-07-24 NOTE — TELEPHONE ENCOUNTER
Controlled Substance Monitoring:    Acute and Chronic Pain Monitoring:   RX Monitoring 7/23/2021   Attestation -   Periodic Controlled Substance Monitoring No signs of potential drug abuse or diversion identified.     -    -

## 2021-08-10 DIAGNOSIS — E10.40 TYPE 1 DIABETES MELLITUS WITH DIABETIC NEUROPATHY (HCC): ICD-10-CM

## 2021-08-10 RX ORDER — INSULIN LISPRO 100 [IU]/ML
INJECTION, SOLUTION INTRAVENOUS; SUBCUTANEOUS
Qty: 15 PEN | Refills: 3 | Status: CANCELLED | OUTPATIENT
Start: 2021-08-10

## 2021-08-10 RX ORDER — INSULIN GLARGINE 100 [IU]/ML
37 INJECTION, SOLUTION SUBCUTANEOUS EVERY MORNING
Qty: 15 PEN | Refills: 3 | Status: CANCELLED | OUTPATIENT
Start: 2021-08-10

## 2021-08-10 NOTE — TELEPHONE ENCOUNTER
Spoke with Lorena Montana, she feels most comfortable with patient taking the Tresiba 20 units. Until crystal gets back in office. Sample given to patient.

## 2021-08-10 NOTE — TELEPHONE ENCOUNTER
Just to clarify - until recently patient had an order for insulin degludec (tresiba) that instructed patient to inject 20 units QD when pump not in place. Will likely need to adjust both basal and meal time insulin as pump is no longer in place. However, Lizbeth Odom is out of office until tomorrow. Does he have the tresiba at home to use as previously directed?

## 2021-08-10 NOTE — TELEPHONE ENCOUNTER
Patient in need of insulin sent to [facility]. Patient was in ER yesterday with BS of over 497. Omnipod dash is no longer covered from insurance. Had no insulin. Medication d/c from ER is Admelog 9-15 units with meals. And Lantus 37 units in the morning. Admelog not covered. Spoke with Pharmacy. Humalog covered. Would like Crystal to send new script to pharmacy if able for both, as lantus is more expensive as well. Pended. Will Kristyn Bailey please fill today for patient. Needs asap. Thank you so much. Please call wife after sent in.

## 2021-08-11 NOTE — TELEPHONE ENCOUNTER
Yes we can try the PA. I remember his employer is very particular about equipment. We can try to get his employer to approve a different pump due to medical necessity or we can try to get insurance to approve an appeals. Or they can continue injections.

## 2021-08-12 RX ORDER — INSULIN PUMP CONTROLLER
1 EACH MISCELLANEOUS
Qty: 2 EACH | Refills: 3 | Status: SHIPPED | OUTPATIENT
Start: 2021-08-12 | End: 2021-10-13 | Stop reason: SDUPTHER

## 2021-09-08 DIAGNOSIS — E11.40 CHRONIC PAINFUL DIABETIC NEUROPATHY (HCC): ICD-10-CM

## 2021-09-08 DIAGNOSIS — E10.51 TYPE 1 DIABETES MELLITUS WITH PERIPHERAL ANGIOPATHY WITHOUT GANGRENE (HCC): ICD-10-CM

## 2021-09-08 NOTE — TELEPHONE ENCOUNTER
Maldonado Long called requesting a refill of the below medication which has been pended for you:     Requested Prescriptions     Pending Prescriptions Disp Refills    lisinopril (PRINIVIL;ZESTRIL) 5 MG tablet [Pharmacy Med Name: LISINOPRIL 5 MG TABLET] 90 tablet 1     Sig: take 1 tablet by mouth once daily    amitriptyline (ELAVIL) 100 MG tablet [Pharmacy Med Name: AMITRIPTYLINE  MG TAB] 90 tablet 0     Sig: take 1 tablet by mouth nightly       Last Appointment Date: 6/14/2021  Next Appointment Date: 9/29/2021    Allergies   Allergen Reactions    Bee Venom Hives     With swelling    Vancomycin Other (See Comments)     From [facility] care plan - reaction unknown.      Lipitor [Atorvastatin] Hives

## 2021-09-09 RX ORDER — LISINOPRIL 5 MG/1
TABLET ORAL
Qty: 90 TABLET | Refills: 3 | Status: SHIPPED | OUTPATIENT
Start: 2021-09-09 | End: 2022-06-15 | Stop reason: SDUPTHER

## 2021-09-09 RX ORDER — AMITRIPTYLINE HYDROCHLORIDE 100 MG/1
100 TABLET, FILM COATED ORAL NIGHTLY
Qty: 90 TABLET | Refills: 1 | Status: SHIPPED | OUTPATIENT
Start: 2021-09-09 | End: 2021-11-17 | Stop reason: ALTCHOICE

## 2021-09-30 ENCOUNTER — TELEPHONE (OUTPATIENT)
Dept: FAMILY MEDICINE CLINIC | Age: 36
End: 2021-09-30

## 2021-09-30 NOTE — TELEPHONE ENCOUNTER
Attempted to reach patient regarding missed appointment on 9/29/21. Unable to contact at this time. Left message to reschedule.
r knee pain

## 2021-10-13 ENCOUNTER — OFFICE VISIT (OUTPATIENT)
Dept: DIABETES SERVICES | Age: 36
End: 2021-10-13
Payer: COMMERCIAL

## 2021-10-13 VITALS
HEIGHT: 70 IN | SYSTOLIC BLOOD PRESSURE: 134 MMHG | HEART RATE: 88 BPM | RESPIRATION RATE: 16 BRPM | BODY MASS INDEX: 22.9 KG/M2 | WEIGHT: 160 LBS | DIASTOLIC BLOOD PRESSURE: 86 MMHG

## 2021-10-13 DIAGNOSIS — E10.40 TYPE 1 DIABETES MELLITUS WITH DIABETIC NEUROPATHY (HCC): Primary | ICD-10-CM

## 2021-10-13 PROCEDURE — 99214 OFFICE O/P EST MOD 30 MIN: CPT

## 2021-10-13 PROCEDURE — G8420 CALC BMI NORM PARAMETERS: HCPCS | Performed by: NURSE PRACTITIONER

## 2021-10-13 PROCEDURE — 4004F PT TOBACCO SCREEN RCVD TLK: CPT | Performed by: NURSE PRACTITIONER

## 2021-10-13 PROCEDURE — G8484 FLU IMMUNIZE NO ADMIN: HCPCS | Performed by: NURSE PRACTITIONER

## 2021-10-13 PROCEDURE — 99214 OFFICE O/P EST MOD 30 MIN: CPT | Performed by: NURSE PRACTITIONER

## 2021-10-13 PROCEDURE — 2022F DILAT RTA XM EVC RTNOPTHY: CPT | Performed by: NURSE PRACTITIONER

## 2021-10-13 PROCEDURE — G8427 DOCREV CUR MEDS BY ELIG CLIN: HCPCS | Performed by: NURSE PRACTITIONER

## 2021-10-13 PROCEDURE — 3051F HG A1C>EQUAL 7.0%<8.0%: CPT | Performed by: NURSE PRACTITIONER

## 2021-10-13 RX ORDER — CHLORPHENIR/PHENYLEPH/ASPIRIN 2-7.8-325
TABLET, EFFERVESCENT ORAL
Qty: 50 STRIP | Refills: 3 | Status: SHIPPED | OUTPATIENT
Start: 2021-10-13

## 2021-10-13 RX ORDER — INSULIN GLARGINE 100 [IU]/ML
INJECTION, SOLUTION SUBCUTANEOUS NIGHTLY
COMMUNITY
End: 2021-11-17 | Stop reason: ALTCHOICE

## 2021-10-13 RX ORDER — INSULIN PUMP CONTROLLER
1 EACH MISCELLANEOUS
Qty: 2 EACH | Refills: 3 | Status: SHIPPED | OUTPATIENT
Start: 2021-10-13 | End: 2022-05-17 | Stop reason: SDUPTHER

## 2021-10-13 RX ORDER — INSULIN DEGLUDEC 200 U/ML
20 INJECTION, SOLUTION SUBCUTANEOUS DAILY
Qty: 1 PEN | Refills: 3 | Status: SHIPPED | OUTPATIENT
Start: 2021-10-13 | End: 2021-11-17 | Stop reason: ALTCHOICE

## 2021-10-13 RX ORDER — LOSARTAN POTASSIUM 25 MG/1
25 TABLET ORAL DAILY
COMMUNITY
Start: 2021-08-09 | End: 2022-06-15

## 2021-10-13 ASSESSMENT — ENCOUNTER SYMPTOMS
RESPIRATORY NEGATIVE: 1
DIARRHEA: 0
ABDOMINAL PAIN: 0
SHORTNESS OF BREATH: 0

## 2021-10-13 NOTE — PROGRESS NOTES
[address], [address]  [facility] [address] [phone]        HISTORY:    Ronny Garcia presents today for evaluation and management of:  Chief Complaint   Patient presents with    Diabetes     3 month appt       HPI    Interval History:    Past DM Medications   none     Current Diabetic Medications  novolog for use with omnipod.      DKA episodes: Multiple episodes of DKA related to not taking insulin and on diagnosis. 21   He was diagnosed in , had flu like symptoms and lost vision and went to the ER started on insulin. Was seen Endo in [facility] but does not like the drive has not been seen in over a year. He has had episodes of DKA which he relates to not taking insulin.  He also admits to medication non compliance. He will often forget to replace the omnipod dash system. He was previously on medtronic insulin pump. He feels he does well on the pump is in place. He would like cgm therapy.    Diet: counting carbs  Exercise: none  BS testin times daily denies hypoglycemia.   Issues: denies      -Patient is on an insulin pump and testing blood sugars 4 more times daily and using these blood sugars for frequent insulin adjustments.     21   At previous visit CGM therapy was started. Patient states he was about 1 week without insulin or CGM due to ordering delay from pharmacy. Diet: Counting carbs  Exercise: None physically active during the day  BS testing: Uses CGM daily with success  Issues: Denies     21   He has been out of pump and cgm supplies for a few weeks and using MDI. He states he can control glucose levels much better while on CGM and pump therapy. Denies any current signs or symptoms of hyper/hypoglycemia. He is due for an eye exam.  He does have paperwork to be filled out so he can have headphones while working so that he can hear his pump and CGM alerts.   He works in a factory with loud noises. Diet: Counting carbohydrates  Exercise: None, physically active during the day. BS testing: Uses CGM daily with success and is adherent to cgm regimen. Issues: denies     10/13/21   At previous visit dm counseling was provided. He has been out of pump supplies and out of long acting insulin and has been using regular insulin. He does admit to weight loss, polyuria and polydipsia. Denies any SOB blurry vision, fatigue or abdominal pain. Diet: counting carbs  Exercise: none. BS testin times daily   Issues: issues with insurance covering supplies and meds. Diabetic foot exam up-to-date: Yes  Diabetic retinal exam up-to-date: No  Hypoglycemia as needed treatment: glucagon, glucose tabs. High cholesterol-  Takes Zocor and denies any adverse effects with its use.  Watches diet and exercise.      Hypertension-  Takes lisinopril and denies any adverse effects with their use. Watches diet and exercise.  Denies any chest pain, dizziness or edema.       Obesity- Working on weight loss.       Past Medical History:   Diagnosis Date    Asthma     Diabetes mellitus ([facility]     type 1    Diabetic ketoacidosis associated with type 1 diabetes mellitus ([facility] 2017    Head injury     Hyperlipidemia     Hypertension     Male erectile dysfunction     Migraine     Neuropathy in diabetes ([facility]      Family History   Problem Relation Age of Onset    Diabetes Mother         type 1    Heart Disease Mother     Kidney Disease Mother     High Cholesterol Mother     High Blood Pressure Mother     High Blood Pressure Father     Diabetes Father         type 2    Heart Disease Father      Social History     Tobacco Use    Smoking status: Current Some Day Smoker     Packs/day: 0.25     Years: 16.00     Pack years: 4.00     Types: Cigarettes     Start date: 2005    Smokeless tobacco: Never Used    Tobacco comment: OPALB RRT 17   Vaping Use    Vaping Use: Never used Substance Use Topics    Alcohol use: Yes     Alcohol/week: 18.0 standard drinks     Types: 18 Cans of beer per week     Comment: has 18 beers in a weekend    Drug use: Never     Allergies   Allergen Reactions    Bee Venom Hives     With swelling    Vancomycin Other (See Comments)     From [facility] care plan - reaction unknown.  Lipitor [Atorvastatin] Hives       MEDICATIONS:  Current Outpatient Medications   Medication Sig Dispense Refill    insulin glargine (BASAGLAR KWIKPEN) 100 UNIT/ML injection pen Inject into the skin nightly      Insulin Degludec (TRESIBA FLEXTOUCH) 200 UNIT/ML SOPN Inject 20 Units into the skin daily 1 pen 3    insulin lispro (HUMALOG) 100 UNIT/ML injection vial Sliding scale or use with omnipod 10 mL 3    Acetone, Urine, Test (KETONE TEST) STRP As need for elevated glucose 50 strip 3    Insulin Disposable Pump (OMNIPOD DASH 5 PACK PODS) MISC Inject 1 each as directed every 3 days 200 units of humalog in each pod last 3 days 2 each 3    lisinopril (PRINIVIL;ZESTRIL) 5 MG tablet take 1 tablet by mouth once daily 90 tablet 3    amitriptyline (ELAVIL) 100 MG tablet Take 1 tablet by mouth nightly 90 tablet 1    gabapentin (NEURONTIN) 400 MG capsule Take 1 cap by mouth qAM, then 1 cap qafternoon, then 2 capsules qHS 120 capsule 1    Glucagon (GVOKE HYPOPEN 2-PACK) 1 MG/0.2ML SOAJ Inject 1 mg into the skin as needed (hypoglycemia) Inject 1mg under the skin as needed for low blood sugars. 2 Syringe 1    sildenafil (VIAGRA) 100 MG tablet Take 1 tablet by mouth daily as needed for Erectile Dysfunction 30 tablet 3    EPINEPHrine (EPIPEN 2-KARY) 0.3 MG/0.3ML SOAJ injection Use as directed for allergic reaction 1 each 1    Insulin Pen Needle (PEN NEEDLES) 32G X 5 MM MISC Use with insulin pen TID with meals.  90 each 5    losartan (COZAAR) 25 MG tablet Take 25 mg by mouth daily      Continuous Blood Gluc Transmit (DEXCOM G6 TRANSMITTER) MISC Use one transmitter every 3 months (Patient not GLUCOSE 162 (H) 08/08/2020    CALCIUM 9.1 01/20/2021    PROT 6.9 08/08/2020    LABALBU 3.8 01/20/2021    BILITOT 0.3 01/20/2021    ALKPHOS 92 01/20/2021    AST 15 01/20/2021    ALT 12 01/20/2021    LABGLOM >60 08/08/2020    GFRAA >60 08/08/2020     Lab Results   Component Value Date    CHOL 184 08/08/2020    CHOL 235 (H) 12/14/2018    CHOL 282 (H) 02/10/2017     Lab Results   Component Value Date    TRIG 71 08/08/2020    TRIG 159 (H) 12/14/2018    TRIG 361 (H) 02/10/2017     Lab Results   Component Value Date    HDL 60 08/08/2020    HDL 71 12/14/2018    HDL 65 02/10/2017     Lab Results   Component Value Date    LDLCHOLESTEROL 110 08/08/2020    LDLCHOLESTEROL 132 (H) 12/14/2018    LDLCHOLESTEROL 145 (H) 02/10/2017     Lab Results   Component Value Date    VLDL NOT REPORTED 08/08/2020    VLDL NOT REPORTED 12/14/2018    VLDL 72 (H) 02/10/2017     Lab Results   Component Value Date    CHOLHDLRATIO 3.1 08/08/2020    CHOLHDLRATIO 3.3 12/14/2018    CHOLHDLRATIO 4.3 02/10/2017           Physical Exam  Constitutional:       Appearance: He is well-developed. Eyes:      Pupils: Pupils are equal, round, and reactive to light. Cardiovascular:      Rate and Rhythm: Normal rate and regular rhythm. Pulmonary:      Effort: Pulmonary effort is normal.      Breath sounds: Normal breath sounds. Skin:     General: Skin is warm and dry. Findings: No lesion (no lipohypertrophy) or rash. Neurological:      Mental Status: He is alert and oriented to person, place, and time. Sensory: No sensory deficit. Psychiatric:         Speech: Speech normal.         Behavior: Behavior normal.         Thought Content: Thought content normal.         Judgment: Judgment normal.           ASSESSMENT/PLAN:     Diagnosis Orders   1.  Type 1 diabetes mellitus with diabetic neuropathy (HCC)  Hemoglobin A1C    Microalbumin, Ur    Insulin Disposable Pump (OMNIPOD DASH 5 PACK PODS) MISC     Orders Placed This Encounter   Procedures    Hemoglobin A1C    Microalbumin, Ur     Orders Placed This Encounter   Medications    Insulin Degludec (TRESIBA FLEXTOUCH) 200 UNIT/ML SOPN     Sig: Inject 20 Units into the skin daily     Dispense:  1 pen     Refill:  3    insulin lispro (HUMALOG) 100 UNIT/ML injection vial     Sig: Sliding scale or use with omnipod     Dispense:  10 mL     Refill:  3    Acetone, Urine, Test (KETONE TEST) STRP     Sig: As need for elevated glucose     Dispense:  50 strip     Refill:  3    Insulin Disposable Pump (OMNIPOD DASH 5 PACK PODS) MISC     Sig: Inject 1 each as directed every 3 days 200 units of humalog in each pod last 3 days     Dispense:  2 each     Refill:  3     Requested Prescriptions     Signed Prescriptions Disp Refills    Insulin Degludec (TRESIBA FLEXTOUCH) 200 UNIT/ML SOPN 1 pen 3     Sig: Inject 20 Units into the skin daily    insulin lispro (HUMALOG) 100 UNIT/ML injection vial 10 mL 3     Sig: Sliding scale or use with omnipod    Acetone, Urine, Test (KETONE TEST) STRP 50 strip 3     Sig: As need for elevated glucose    Insulin Disposable Pump (OMNIPOD DASH 5 PACK PODS) MISC 2 each 3     Sig: Inject 1 each as directed every 3 days 200 units of humalog in each pod last 3 days       1. Type 1 diabetes mellitus with diabetic neuropathy (HCC)    - Hemoglobin A1C; Future  - Microalbumin, Ur; Future  - Insulin Disposable Pump (OMNIPOD DASH 5 PACK PODS) MISC; Inject 1 each as directed every 3 days 200 units of humalog in each pod last 3 days  Dispense: 2 each; Refill: 3    - Unstable  HbA1C goal is less than 7%. - Fasting blood glucose goal is 70-120mg/dl and postprandial blood sugar goal is less than 180 mg/dl. - Labs reviewed including most recent A1c, microalbumin and kidney function. Repeat labs due in 1 week. -We discussed in great detail dietary modifications they can make to better improve their blood sugars.   -follow up diabetes education completed, all questions answered.  -sample [place] given to pt, with pen needles and syringes. Will call [facility] to check on omnipod order.   -discussed DKA and when to seek ER. Patient Instructions   Start tresiba 20 units once daily   Get dexcom started   Stay well hydrated   Check glucose every 2 hours           Discussed signs and symptoms of hyper/hypoglycemia and how to treat. Encouraged 150 minutes of physical activity per week. Follow a low carbohydrate diet. Encouraged at least 7 hours of sleep. The patient was informed of the goals of diabetes management. This can only be accomplished by watching their diet and exercise levels. We certainly use medicines to help attain these goals. The consequences of not controlling blood sugars were discussed. These include blindness, heart disease, stroke, kidney disease, and possibly need for dialysis. They were told to be careful with their foot care as diabetics often have nerve damage, infections and risk for limb amutations . They also need a dilated eye exam yearly. We discussed the issues of diet, exercise, medication, complication avoidance, reviewed the signs and symptoms of diabetes, hypoglycemic episodes, significance of HbA1C. Answered all patient questions. Agrees to follow plan of care and to follow up in 2 weeks, sooner if needed. Call office if unexplained blood sugars less than 70 occur or above 400. Call office or access [facility] with any further questions or concerns. Be sure to bring glucometer/food log at next appointment. Total time spent reviewing chart, labs, counseling patient and documenting on the date of the encounter: 30 min.       Electronically signed by BEATRIS Kelly CNP on 10/13/2021 at 8:59 PM      (Please note that portions of this note were completed with a voice-recognition program. Efforts were made to edit the dictation but occasionally words are mis-transcribed.)

## 2021-10-13 NOTE — PATIENT INSTRUCTIONS
Start tresiba 20 units once daily   Get dexcom started   Stay well hydrated   Check glucose every 2 hours

## 2021-11-08 DIAGNOSIS — E10.42 DIABETIC POLYNEUROPATHY ASSOCIATED WITH TYPE 1 DIABETES MELLITUS (HCC): ICD-10-CM

## 2021-11-08 NOTE — TELEPHONE ENCOUNTER
Patient needs Humalog vial script. He says [facility], he only received 1 vial and he should have received 6.   Please correct and send new rx to [facility] in [place]

## 2021-11-09 RX ORDER — BLOOD-GLUCOSE SENSOR
EACH MISCELLANEOUS
Qty: 3 EACH | Refills: 3 | Status: SHIPPED | OUTPATIENT
Start: 2021-11-09 | End: 2022-04-04

## 2021-11-17 ENCOUNTER — OFFICE VISIT (OUTPATIENT)
Dept: DIABETES SERVICES | Age: 36
End: 2021-11-17
Payer: COMMERCIAL

## 2021-11-17 VITALS
BODY MASS INDEX: 24.91 KG/M2 | SYSTOLIC BLOOD PRESSURE: 128 MMHG | HEIGHT: 70 IN | HEART RATE: 92 BPM | DIASTOLIC BLOOD PRESSURE: 88 MMHG | RESPIRATION RATE: 14 BRPM | WEIGHT: 174 LBS

## 2021-11-17 DIAGNOSIS — E78.2 MIXED HYPERLIPIDEMIA: ICD-10-CM

## 2021-11-17 DIAGNOSIS — E10.40 TYPE 1 DIABETES MELLITUS WITH DIABETIC NEUROPATHY (HCC): Primary | ICD-10-CM

## 2021-11-17 DIAGNOSIS — I10 ESSENTIAL HYPERTENSION: ICD-10-CM

## 2021-11-17 PROCEDURE — G8484 FLU IMMUNIZE NO ADMIN: HCPCS | Performed by: NURSE PRACTITIONER

## 2021-11-17 PROCEDURE — 99214 OFFICE O/P EST MOD 30 MIN: CPT | Performed by: NURSE PRACTITIONER

## 2021-11-17 PROCEDURE — G8427 DOCREV CUR MEDS BY ELIG CLIN: HCPCS | Performed by: NURSE PRACTITIONER

## 2021-11-17 PROCEDURE — 3051F HG A1C>EQUAL 7.0%<8.0%: CPT | Performed by: NURSE PRACTITIONER

## 2021-11-17 PROCEDURE — G8420 CALC BMI NORM PARAMETERS: HCPCS | Performed by: NURSE PRACTITIONER

## 2021-11-17 PROCEDURE — 95251 CONT GLUC MNTR ANALYSIS I&R: CPT | Performed by: NURSE PRACTITIONER

## 2021-11-17 PROCEDURE — 4004F PT TOBACCO SCREEN RCVD TLK: CPT | Performed by: NURSE PRACTITIONER

## 2021-11-17 PROCEDURE — 2022F DILAT RTA XM EVC RTNOPTHY: CPT | Performed by: NURSE PRACTITIONER

## 2021-11-17 RX ORDER — DULOXETIN HYDROCHLORIDE 30 MG/1
30 CAPSULE, DELAYED RELEASE ORAL DAILY
Qty: 30 CAPSULE | Refills: 5 | Status: SHIPPED | OUTPATIENT
Start: 2021-11-17 | End: 2022-06-15 | Stop reason: ALTCHOICE

## 2021-11-17 ASSESSMENT — ENCOUNTER SYMPTOMS
SHORTNESS OF BREATH: 0
ABDOMINAL PAIN: 0
DIARRHEA: 0
RESPIRATORY NEGATIVE: 1

## 2021-11-17 NOTE — PROGRESS NOTES
[address]  [address] [facility], [address]  [facility] [address] [phone]        HISTORY:    Hannah Ingram presents today for evaluation and management of:  Chief Complaint   Patient presents with    Diabetes     1 month appt. HPI    Interval History:    Past DM Medications   none     Current Diabetic Medications  novolog for use with omnipod.      DKA episodes: Multiple episodes of DKA related to not taking insulin and on diagnosis. 21   At previous visit CGM therapy was started. Patient states he was about 1 week without insulin or CGM due to ordering delay from pharmacy. Diet: Counting carbs  Exercise: None physically active during the day  BS testing: Uses CGM daily with success  Issues: Denies     21   He has been out of pump and cgm supplies for a few weeks and using MDI. He states he can control glucose levels much better while on CGM and pump therapy.  Denies any current signs or symptoms of hyper/hypoglycemia.  He is due for an eye exam. Timbo Pretty does have paperwork to be filled out so he can have headphones while working so that he can hear his pump and CGM alerts. Timbo Pretty works in a [employer] with loud noises. Diet: Counting carbohydrates  Exercise: None, physically active during the day. BS testing: Uses CGM daily with success and is adherent to cgm regimen.   Issues: denies      10/13/21   At previous visit dm counseling was provided. He has been out of pump supplies and out of long acting insulin and has been using regular insulin. He does admit to weight loss, polyuria and polydipsia. Denies any SOB blurry vision, fatigue or abdominal pain. Diet: counting carbs  Exercise: none. BS testin times daily   Issues: issues with insurance covering supplies and meds.    Diabetic foot exam up-to-date: Yes  Diabetic retinal exam up-to-date: No  Hypoglycemia as needed treatment: glucagon, glucose tabs.    617/21   39year-old male patient here for type 1 diabetes follow-up. At previous visit discussed signs and symptoms of DKA. Discussed backup plan for MDI when insulin pump is not used. He has been able to continue his insulin pump but is having trouble getting enough insulin from the pharmacy. Denies any signs or symptoms of hyper/hypoglycemia he states he is feeling better with better control blood sugars states he is having low blood sugars during the working hours of 3 AM to 3 PM.  He does carry a backpack with snacks. Clerance Maizes was not covered by insurance. Patient states he does have painful diabetic peripheral neuropathy in bilateral feet and is also noticing some neuropathy in his fingertips. He takes gabapentin for this and states it is not working as well as it used to. In the past he has also tried gabapentin with Cymbalta however insurance did not cover Cymbalta and is wondering if we can try sending it through insurance again. States he no longer takes amitriptyline which was used for migraines this will be removed from his medication list per patient request.  Diet: Counting carbs  Exercise: None  BS testing: uses cgm daily with success and is adherent to cgm therapy  Issues: supply of insulin  Diabetic foot exam up-to-date: Yes  Diabetic retinal exam up-to-date: No  Hypoglycemia as needed treatment: snacks,     High cholesterol-  Takes Zocor and denies any adverse effects with its use.  Watches diet and exercise.      Hypertension-  Takes lisinopril and denies any adverse effects with their use. Watches diet and exercise.  Denies any chest pain, dizziness or edema.       Obesity- Working on weight loss.       Past Medical History:   Diagnosis Date    Asthma     Diabetes mellitus ([address]     type 1    Diabetic ketoacidosis associated with type 1 diabetes mellitus ([facility] 04/28/2017    Head injury     Hyperlipidemia     Hypertension     Male erectile dysfunction     Migraine     Neuropathy in diabetes ([facility]      Family History   Problem Relation Age of Onset    Diabetes Mother         type 1    Heart Disease Mother     Kidney Disease Mother     High Cholesterol Mother     High Blood Pressure Mother     High Blood Pressure Father     Diabetes Father         type 2    Heart Disease Father      Social History     Tobacco Use    Smoking status: Current Some Day Smoker     Packs/day: 0.25     Years: 16.00     Pack years: 4.00     Types: Cigarettes     Start date: 1/1/2005    Smokeless tobacco: Never Used    Tobacco comment: ECLAMB RRT 7/19/17   Vaping Use    Vaping Use: Never used   Substance Use Topics    Alcohol use: Yes     Alcohol/week: 18.0 standard drinks     Types: 18 Cans of beer per week     Comment: has 18 beers in a weekend    Drug use: Never     Allergies   Allergen Reactions    Bee Venom Hives     With swelling    Vancomycin Other (See Comments)     From [facility] care plan - reaction unknown.  Lipitor [Atorvastatin] Hives       MEDICATIONS:  Current Outpatient Medications   Medication Sig Dispense Refill    DULoxetine (CYMBALTA) 30 MG extended release capsule Take 1 capsule by mouth daily 30 capsule 5    losartan (COZAAR) 25 MG tablet Take 25 mg by mouth daily      Insulin Disposable Pump (OMNIPOD DASH 5 PACK PODS) MISC Inject 1 each as directed every 3 days 200 units of humalog in each pod last 3 days 2 each 3    lisinopril (PRINIVIL;ZESTRIL) 5 MG tablet take 1 tablet by mouth once daily 90 tablet 3    gabapentin (NEURONTIN) 400 MG capsule Take 1 cap by mouth qAM, then 1 cap qafternoon, then 2 capsules qHS 120 capsule 1    Glucagon (GVOKE HYPOPEN 2-PACK) 1 MG/0.2ML SOAJ Inject 1 mg into the skin as needed (hypoglycemia) Inject 1mg under the skin as needed for low blood sugars. 2 Syringe 1    simvastatin (ZOCOR) 20 MG tablet Take 1 tablet by mouth every evening 90 tablet 1    HUMALOG 100 UNIT/ML injection vial Sliding scale or use with omnipod.  Max daily dose 65 units 60 mL 3    Continuous Blood Gluc Sensor (DEXCOM G6 SENSOR) MISC USE 1 SENSOR EVERY 10 DAYS 3 each 3    Acetone, Urine, Test (KETONE TEST) STRP As need for elevated glucose 50 strip 3    Continuous Blood Gluc Transmit (DEXCOM G6 TRANSMITTER) MISC Use one transmitter every 3 months (Patient not taking: Reported on 10/13/2021) 1 each 3    sildenafil (VIAGRA) 100 MG tablet Take 1 tablet by mouth daily as needed for Erectile Dysfunction 30 tablet 3    Continuous Blood Gluc  (DEXCOM G6 ) JARED 1 Units by Does not apply route daily (Patient not taking: Reported on 10/13/2021) 1 Device 0    EPINEPHrine (EPIPEN 2-KARY) 0.3 MG/0.3ML SOAJ injection Use as directed for allergic reaction (Patient not taking: Reported on 11/17/2021) 1 each 1    Insulin Pen Needle (PEN NEEDLES) 32G X 5 MM MISC Use with insulin pen TID with meals. 90 each 5     No current facility-administered medications for this visit. Review ofSymptoms:  Review of Systems   Constitutional: Positive for fatigue. Negative for unexpected weight change. Eyes: Negative for visual disturbance. Respiratory: Negative. Negative for shortness of breath. Cardiovascular: Negative for chest pain and leg swelling. Gastrointestinal: Negative for abdominal pain and diarrhea. Endocrine: Negative for polydipsia, polyphagia and polyuria. Genitourinary: Negative. Musculoskeletal: Negative. Skin: Negative for rash and wound. Neurological: Negative for dizziness, tremors, seizures and headaches. Psychiatric/Behavioral: Negative. Negative for confusion and decreased concentration. Theremainder of a complete 14-point review of systems is negative.        Vital Signs: /88 (Site: Right Upper Arm, Position: Sitting, Cuff Size: Medium Adult)   Pulse 92   Resp 14   Ht 5' 10\" (1.778 m)   Wt 174 lb (78.9 kg)   BMI 24.97 kg/m²      Wt Readings from Last 3 Encounters:   11/17/21 174 lb (78.9 kg)   10/13/21 160 lb (72.6 kg)   07/07/21 174 lb (78.9 kg)     Body mass index is 24.97 kg/m². LABS:  Hemoglobin A1C   Date Value Ref Range Status   06/14/2021 7.2 (H) 4.0 - 6.0 % Final   03/02/2021 12.5 (H) 4.0 - 6.0 % Final     Lab Results   Component Value Date    LABMICR 29 (H) 08/08/2020     Lab Results   Component Value Date     01/20/2021    K 3.8 01/20/2021     01/20/2021    CO2 29 01/20/2021    BUN 16 01/20/2021    CREATININE 0.4 01/20/2021    GLUCOSE 162 (H) 08/08/2020    CALCIUM 9.1 01/20/2021    PROT 6.9 08/08/2020    LABALBU 3.8 01/20/2021    BILITOT 0.3 01/20/2021    ALKPHOS 92 01/20/2021    AST 15 01/20/2021    ALT 12 01/20/2021    LABGLOM >60 08/08/2020    GFRAA >60 08/08/2020     Lab Results   Component Value Date    CHOL 184 08/08/2020    CHOL 235 (H) 12/14/2018    CHOL 282 (H) 02/10/2017     Lab Results   Component Value Date    TRIG 71 08/08/2020    TRIG 159 (H) 12/14/2018    TRIG 361 (H) 02/10/2017     Lab Results   Component Value Date    HDL 60 08/08/2020    HDL 71 12/14/2018    HDL 65 02/10/2017     Lab Results   Component Value Date    LDLCHOLESTEROL 110 08/08/2020    LDLCHOLESTEROL 132 (H) 12/14/2018    LDLCHOLESTEROL 145 (H) 02/10/2017     Lab Results   Component Value Date    VLDL NOT REPORTED 08/08/2020    VLDL NOT REPORTED 12/14/2018    VLDL 72 (H) 02/10/2017     Lab Results   Component Value Date    CHOLHDLRATIO 3.1 08/08/2020    CHOLHDLRATIO 3.3 12/14/2018    CHOLHDLRATIO 4.3 02/10/2017           Physical Exam  Constitutional:       Appearance: He is well-developed. Eyes:      Pupils: Pupils are equal, round, and reactive to light. Cardiovascular:      Rate and Rhythm: Normal rate and regular rhythm. Pulmonary:      Effort: Pulmonary effort is normal.      Breath sounds: Normal breath sounds. Skin:     General: Skin is warm and dry. Findings: No lesion (no lipohypertrophy) or rash. Neurological:      Mental Status: He is alert and oriented to person, place, and time.       Sensory: No sensory deficit. Psychiatric:         Speech: Speech normal.         Behavior: Behavior normal.         Thought Content: Thought content normal.         Judgment: Judgment normal.       Diabetic foot check:Normal strength and range of motion of toes, feet, and ankles bilaterally. No joint deformity. No cyanosis or clubbing. 50% sensation at all 22 test points with the 10 gram filament. Dorsalis pedis pulses intactbilaterally. Capillary refill at the toes was less than 2 seconds. Vibratory sensation (with 128 Hz tuning fork) absent bilaterally. Light touch sensation intact bilaterally. Hair growth present on feet and toes bilaterally. No skin breakdown, erythema, rub spots, blisters, scaling, or ulcers. No calluses or corns. Toenails thin and not ingrown. No evidence of fungal infection. ASSESSMENT/PLAN:     Diagnosis Orders   1. Type 1 diabetes mellitus with diabetic neuropathy (HCC)  DULoxetine (CYMBALTA) 30 MG extended release capsule   2. Mixed hyperlipidemia     3. Essential hypertension       No orders of the defined types were placed in this encounter. Orders Placed This Encounter   Medications    DULoxetine (CYMBALTA) 30 MG extended release capsule     Sig: Take 1 capsule by mouth daily     Dispense:  30 capsule     Refill:  5     Requested Prescriptions     Signed Prescriptions Disp Refills    DULoxetine (CYMBALTA) 30 MG extended release capsule 30 capsule 5     Sig: Take 1 capsule by mouth daily       1. Type 1 diabetes mellitus with diabetic neuropathy (HCC)  - Unstable  HbA1C goal is less than 7%. - Fasting blood glucose goal is 70-120mg/dl and postprandial blood sugar goal is less than 180 mg/dl. - Labs reviewed including most recent A1c, microalbumin and kidney function. Repeat labs due in 3 months.    -We discussed in great detail dietary modifications they can make to better improve their blood sugars. -follow up diabetes education completed, all questions answered.   CGM report downloaded and reviewed, scanned to media tab. Time in range 21% and hypoglycemia 0%. Average glucose 237 mg/dL    -After CGM review patient has made significant improvement since being back on OmniPod however we will make the following pump adjustments.  -We will also add Cymbalta for diabetic peripheral neuropathy.  -Patient will start a new medication. Medication reviewed with patient including administration and possible side effects. Discussed risk and benefits including any black box warnings. All questions answered. Insulin Instructions  Pump Settings      Last edited by BEATRIS Pineda CNP on 11/17/2021 at 4:19 PM      Basal Rate   Total Basal Dose: 33.3 units/day   Time units/hr   12:00 AM 1.4    3:00 AM 1.3    3:00 PM 1.5      Blood Glucose Target   Time mg/dL   12:00  - 120      Sensitivity Factor   Time mg/dL/unit   12:00 AM 35      Carb Ratio   Time g/unit   12:00 AM 7    3:00 AM 8    3:00 PM 7         Discussed signs and symptoms of hyper/hypoglycemia and how to treat. Encouraged 150 minutes of physical activity per week. Follow a low carbohydrate diet. Encouraged at least 7 hours of sleep. The patient was informed of the goals of diabetes management. This can only be accomplished by watching their diet and exercise levels. We certainly use medicines to help attain these goals. The consequences of not controlling blood sugars were discussed. These include blindness, heart disease, stroke, kidney disease, and possibly need for dialysis. They were told to be careful with their foot care as diabetics often have nerve damage, infections and risk for limb amutations . They also need a dilated eye exam yearly. We discussed the issues of diet, exercise, medication, complication avoidance, reviewed the signs and symptoms of diabetes, hypoglycemic episodes, significance of HbA1C.       - DULoxetine (CYMBALTA) 30 MG extended release capsule; Take 1 capsule by mouth daily  Dispense: 30 capsule;  Refill: 5    2. Mixed hyperlipidemia  stable, lipid panel reviewed, continue current medications. Diet and exercise      3. Essential hypertension   stable, continue current medications. Diet and exercise Seek emergent care if chest pain develops. Answered all patient questions. Agrees to follow plan of care and to follow up in 1 months, sooner if needed. Call office if unexplained blood sugars less than 70 occur or above 400. Call office or access MyChart with any further questions or concerns. Be sure to bring glucometer/food log at next appointment. Total time spent reviewing chart, labs, counseling patient and documenting on the date of the encounter: 30 min.       Electronically signed by BEATRIS Lee CNP on 11/17/2021 at 5:57 PM      (Please note that portions of this note were completed with a voice-recognition program. Efforts were made to edit the dictation but occasionally words are mis-transcribed.)

## 2021-11-17 NOTE — TELEPHONE ENCOUNTER
Pharmacy stated that insulin was ran as insulin lispro and they can not try to change it to humalog so it needs prior auth. Asked if we could send again as YOLANDA. Pended.

## 2021-11-25 DIAGNOSIS — E10.42 TYPE 1 DIABETES MELLITUS WITH DIABETIC POLYNEUROPATHY (HCC): ICD-10-CM

## 2021-11-30 RX ORDER — GABAPENTIN 400 MG/1
CAPSULE ORAL
Qty: 120 CAPSULE | Refills: 1 | Status: SHIPPED | OUTPATIENT
Start: 2021-11-30 | End: 2022-01-11 | Stop reason: SDUPTHER

## 2021-11-30 NOTE — TELEPHONE ENCOUNTER
Left detailed  Message for patient to call office to set up a future appointment. Informed medication was refilled.

## 2021-11-30 NOTE — TELEPHONE ENCOUNTER
Pt needs future appt scheduled. Will need CSA signed at next visit. Controlled Substance Monitoring:    Acute and Chronic Pain Monitoring:   RX Monitoring 11/30/2021   Attestation -   Periodic Controlled Substance Monitoring No signs of potential drug abuse or diversion identified.     -    -

## 2022-01-11 ENCOUNTER — OFFICE VISIT (OUTPATIENT)
Dept: FAMILY MEDICINE CLINIC | Age: 37
End: 2022-01-11
Payer: COMMERCIAL

## 2022-01-11 VITALS
BODY MASS INDEX: 26.14 KG/M2 | HEIGHT: 70 IN | DIASTOLIC BLOOD PRESSURE: 90 MMHG | WEIGHT: 182.6 LBS | SYSTOLIC BLOOD PRESSURE: 124 MMHG | OXYGEN SATURATION: 99 % | TEMPERATURE: 98.8 F | HEART RATE: 88 BPM

## 2022-01-11 DIAGNOSIS — I10 ESSENTIAL (PRIMARY) HYPERTENSION: ICD-10-CM

## 2022-01-11 DIAGNOSIS — E11.40 CHRONIC PAINFUL DIABETIC NEUROPATHY (HCC): ICD-10-CM

## 2022-01-11 DIAGNOSIS — E10.42 TYPE 1 DIABETES MELLITUS WITH DIABETIC POLYNEUROPATHY (HCC): Primary | ICD-10-CM

## 2022-01-11 DIAGNOSIS — Z91.030 ALLERGY TO BEE STING: ICD-10-CM

## 2022-01-11 DIAGNOSIS — L30.9 DERMATITIS: ICD-10-CM

## 2022-01-11 PROCEDURE — 4004F PT TOBACCO SCREEN RCVD TLK: CPT | Performed by: FAMILY MEDICINE

## 2022-01-11 PROCEDURE — 3046F HEMOGLOBIN A1C LEVEL >9.0%: CPT | Performed by: FAMILY MEDICINE

## 2022-01-11 PROCEDURE — G8484 FLU IMMUNIZE NO ADMIN: HCPCS | Performed by: FAMILY MEDICINE

## 2022-01-11 PROCEDURE — G8427 DOCREV CUR MEDS BY ELIG CLIN: HCPCS | Performed by: FAMILY MEDICINE

## 2022-01-11 PROCEDURE — G8419 CALC BMI OUT NRM PARAM NOF/U: HCPCS | Performed by: FAMILY MEDICINE

## 2022-01-11 PROCEDURE — 99214 OFFICE O/P EST MOD 30 MIN: CPT | Performed by: FAMILY MEDICINE

## 2022-01-11 PROCEDURE — 99213 OFFICE O/P EST LOW 20 MIN: CPT | Performed by: FAMILY MEDICINE

## 2022-01-11 PROCEDURE — 2022F DILAT RTA XM EVC RTNOPTHY: CPT | Performed by: FAMILY MEDICINE

## 2022-01-11 RX ORDER — GABAPENTIN 400 MG/1
CAPSULE ORAL
Qty: 120 CAPSULE | Refills: 2 | Status: SHIPPED | OUTPATIENT
Start: 2022-01-11 | End: 2022-05-26

## 2022-01-11 RX ORDER — EPINEPHRINE 0.3 MG/.3ML
INJECTION SUBCUTANEOUS
Qty: 1 EACH | Refills: 1 | Status: SHIPPED | OUTPATIENT
Start: 2022-01-11 | End: 2022-06-15 | Stop reason: SDUPTHER

## 2022-01-11 ASSESSMENT — ENCOUNTER SYMPTOMS
SHORTNESS OF BREATH: 0
COUGH: 0
BLOOD IN STOOL: 0

## 2022-01-11 NOTE — PROGRESS NOTES
[address]  [address], [address] [address] [place]  [phone]      Tegan Steven is a 39 y.o. male who presents today for his medical conditions/complaints as noted below. Tegan Steven is c/o of Diabetes      HPI:     Pt here today for follow-up of DM and neuropathy. States his readings have been 120-150's most recently. States he had some higher readings over the holidays. Highest readings are 270-280. Has had some low readings that usually occur between 5:00-11:00 am; can be 55-70. Relates this to how much he has to exert himself at work. Has started snacking more often to keep his glucose higher - usually something like peanut butter crackers. Drinks Gatorade or one Diet soda during work. Overdue for DM eye exam - was seeing Dr. Damon Harman, so will call for appt. Taking Gabapentin 400 mg qAM, qafternoon, and 2 qHS - working well for his neuropathy sx's. Denies side effects to that dose. Needs refill today. Also taking Cymbalta 30 mg nightly - takes it either at dinnertime or right at bedtime.         Past Medical History:   Diagnosis Date    Asthma     Diabetes mellitus ([address]     type 1    Diabetic ketoacidosis associated with type 1 diabetes mellitus ([address] 04/28/2017    Head injury     Hyperlipidemia     Hypertension     Male erectile dysfunction     Migraine     Neuropathy in diabetes [facility])       Past Surgical History:   Procedure Laterality Date    APPENDECTOMY      TYMPANOSTOMY TUBE PLACEMENT       Family History   Problem Relation Age of Onset    Diabetes Mother         type 1    Heart Disease Mother     Kidney Disease Mother     High Cholesterol Mother     High Blood Pressure Mother     High Blood Pressure Father     Diabetes Father         type 2    Heart Disease Father      Social History     Tobacco Use    Smoking status: Current Some Day Smoker     Packs/day: 0.25     Years: 16.00     Pack years: 4.00     Types: Cigarettes     Start date: 1/1/2005    Smokeless tobacco: Never Used    Tobacco comment: ECLAMB RRT 7/19/17   Substance Use Topics    Alcohol use: Yes     Alcohol/week: 18.0 standard drinks     Types: 18 Cans of beer per week     Comment: has 18 beers in a weekend      Current Outpatient Medications   Medication Sig Dispense Refill    EPINEPHrine (EPIPEN 2-KARY) 0.3 MG/0.3ML SOAJ injection Use as directed for allergic reaction 1 each 1    gabapentin (NEURONTIN) 400 MG capsule TAKE 1 CAPSULE BY MOUTH EVERY MORNING, THEN TAKE 1 CAPSULE EVERY AFTERNOON, THEN TAKE 2 CAPSULES AT BEDTIME 120 capsule 2    triamcinolone (KENALOG) 0.1 % ointment Apply topically to affected areas twice daily x 14 days. 30 g 1    DULoxetine (CYMBALTA) 30 MG extended release capsule Take 1 capsule by mouth daily 30 capsule 5    HUMALOG 100 UNIT/ML injection vial Sliding scale or use with omnipod. Max daily dose 65 units 60 mL 3    Continuous Blood Gluc Sensor (DEXCOM G6 SENSOR) MISC USE 1 SENSOR EVERY 10 DAYS 3 each 3    losartan (COZAAR) 25 MG tablet Take 25 mg by mouth daily      Insulin Disposable Pump (OMNIPOD DASH 5 PACK PODS) MISC Inject 1 each as directed every 3 days 200 units of humalog in each pod last 3 days 2 each 3    lisinopril (PRINIVIL;ZESTRIL) 5 MG tablet take 1 tablet by mouth once daily 90 tablet 3    Glucagon (GVOKE HYPOPEN 2-PACK) 1 MG/0.2ML SOAJ Inject 1 mg into the skin as needed (hypoglycemia) Inject 1mg under the skin as needed for low blood sugars.  2 Syringe 1    Continuous Blood Gluc Transmit (DEXCOM G6 TRANSMITTER) MISC Use one transmitter every 3 months 1 each 3    sildenafil (VIAGRA) 100 MG tablet Take 1 tablet by mouth daily as needed for Erectile Dysfunction 30 tablet 3    Continuous Blood Gluc  (DEXCOM G6 ) JARED 1 Units by Does not apply route daily 1 Device 0    simvastatin (ZOCOR) 20 MG tablet Take 1 tablet by mouth every evening 90 tablet 1    Insulin Pen Needle (PEN NEEDLES) 32G X 5 MM MISC Use with insulin pen TID with meals. 90 each 5    Acetone, Urine, Test (KETONE TEST) STRP As need for elevated glucose (Patient not taking: Reported on 1/11/2022) 50 strip 3     No current facility-administered medications for this visit. Allergies   Allergen Reactions    Bee Venom Hives     With swelling    Vancomycin Other (See Comments)     From [facility] plan - reaction unknown.  Lipitor [Atorvastatin] Hives       Health Maintenance   Topic Date Due    Hepatitis C screen  Never done    Varicella vaccine (1 of 2 - 2-dose childhood series) Never done    Hepatitis B vaccine (2 of 3 - Risk 3-dose series) 09/25/1998    Pneumococcal 0-64 years Vaccine (1 of 2 - PPSV23) 12/16/2015    Diabetic retinal exam  11/06/2018    Diabetic microalbuminuria test  08/08/2021    Lipid screen  08/08/2021    Potassium monitoring  01/20/2022    Creatinine monitoring  01/20/2022    COVID-19 Vaccine (1) 04/13/2022 (Originally 3/3/1990)    Flu vaccine (1) 06/30/2022 (Originally 9/1/2021)    Diabetic foot exam  03/02/2022    Depression Screen  03/02/2022    A1C test (Diabetic or Prediabetic)  06/14/2022    DTaP/Tdap/Td vaccine (6 - Td or Tdap) 10/21/2025    HIV screen  Completed    Hepatitis A vaccine  Aged Out    Hib vaccine  Aged Out    Meningococcal (ACWY) vaccine  Aged Out       Subjective:      Review of Systems   Constitutional: Negative for fever. Respiratory: Negative for cough and shortness of breath. Cardiovascular: Negative for chest pain and palpitations. Gastrointestinal: Negative for blood in stool. Genitourinary: Negative for dysuria, frequency and hematuria. Skin: Positive for rash. Neurological: Negative for dizziness and light-headedness.        Objective:     Vitals:    01/11/22 0814   BP: (!) 124/90   Site: Right Upper Arm   Position: Sitting   Cuff Size: Large Adult   Pulse: 88   Temp: 98.8 °F (37.1 °C)   TempSrc: Temporal   SpO2: 99%   Weight: 182 lb 9.6 oz (82.8 kg)   Height: 5' 10\" (1.778 m)     Physical Exam  Vitals and nursing note reviewed. Constitutional:       General: He is not in acute distress. Appearance: He is well-developed. HENT:      Head: Normocephalic and atraumatic. Right Ear: Tympanic membrane, ear canal and external ear normal.      Left Ear: Tympanic membrane, ear canal and external ear normal.      Nose: Nose normal.      Mouth/Throat:      Mouth: Mucous membranes are moist.      Pharynx: Oropharynx is clear. No oropharyngeal exudate. Eyes:      Conjunctiva/sclera: Conjunctivae normal.   Cardiovascular:      Rate and Rhythm: Normal rate and regular rhythm. Heart sounds: Normal heart sounds. Pulmonary:      Effort: Pulmonary effort is normal. No respiratory distress. Breath sounds: Normal breath sounds. Abdominal:      General: Bowel sounds are normal. There is no distension. Palpations: Abdomen is soft. Tenderness: There is no abdominal tenderness. Skin:     General: Skin is warm and dry. Findings: Rash (R lateral foot/ankle) present. Neurological:      Mental Status: He is alert and oriented to person, place, and time. Assessment:      1. Type 1 diabetes mellitus with diabetic polyneuropathy (HCC)  -     gabapentin (NEURONTIN) 400 MG capsule; TAKE 1 CAPSULE BY MOUTH EVERY MORNING, THEN TAKE 1 CAPSULE EVERY AFTERNOON, THEN TAKE 2 CAPSULES AT BEDTIME, Disp-120 capsule, R-2Normal  2. Chronic painful diabetic neuropathy ([address]  3. Essential (primary) hypertension  4. Dermatitis  -     triamcinolone (KENALOG) 0.1 % ointment; Apply topically to affected areas twice daily x 14 days. , Disp-30 g, R-1, Normal  5. Allergy to bee sting  -     EPINEPHrine (EPIPEN 2-KARY) 0.3 MG/0.3ML SOAJ injection; Use as directed for allergic reaction, Disp-1 each, R-1Normal         Plan:      Declined flu vaccine and Covid vaccine today. Will wait on the Pneumovax today.       Return in about 4 months (around 5/11/2022) for f/u DM, HLD, labs. No orders of the defined types were placed in this encounter. Orders Placed This Encounter   Medications    EPINEPHrine (EPIPEN 2-KARY) 0.3 MG/0.3ML SOAJ injection     Sig: Use as directed for allergic reaction     Dispense:  1 each     Refill:  1    gabapentin (NEURONTIN) 400 MG capsule     Sig: TAKE 1 CAPSULE BY MOUTH EVERY MORNING, THEN TAKE 1 CAPSULE EVERY AFTERNOON, THEN TAKE 2 CAPSULES AT BEDTIME     Dispense:  120 capsule     Refill:  2    triamcinolone (KENALOG) 0.1 % ointment     Sig: Apply topically to affected areas twice daily x 14 days. Dispense:  30 g     Refill:  1       Patient given educational materials - see patient instructions. Discussed use, benefit, and side effects of prescribed medications. All patient questions answered. Pt voiced understanding. Reviewed health maintenance.             Electronically signed by Douglas Foster DO, DO on 1/23/2022 at 11:50 PM

## 2022-04-02 DIAGNOSIS — E10.42 DIABETIC POLYNEUROPATHY ASSOCIATED WITH TYPE 1 DIABETES MELLITUS (HCC): ICD-10-CM

## 2022-04-04 RX ORDER — BLOOD-GLUCOSE SENSOR
EACH MISCELLANEOUS
Qty: 3 EACH | Refills: 0 | Status: SHIPPED | OUTPATIENT
Start: 2022-04-04 | End: 2022-05-04

## 2022-04-05 DIAGNOSIS — E10.42 DIABETIC POLYNEUROPATHY ASSOCIATED WITH TYPE 1 DIABETES MELLITUS (HCC): ICD-10-CM

## 2022-04-05 RX ORDER — BLOOD-GLUCOSE TRANSMITTER
EACH MISCELLANEOUS
Qty: 1 EACH | Refills: 0 | Status: SHIPPED | OUTPATIENT
Start: 2022-04-05 | End: 2022-06-22 | Stop reason: SDUPTHER

## 2022-05-03 DIAGNOSIS — E10.42 DIABETIC POLYNEUROPATHY ASSOCIATED WITH TYPE 1 DIABETES MELLITUS (HCC): ICD-10-CM

## 2022-05-04 RX ORDER — BLOOD-GLUCOSE SENSOR
EACH MISCELLANEOUS
Qty: 3 EACH | Refills: 0 | Status: SHIPPED | OUTPATIENT
Start: 2022-05-04 | End: 2022-06-15 | Stop reason: SDUPTHER

## 2022-05-11 ENCOUNTER — TELEPHONE (OUTPATIENT)
Dept: FAMILY MEDICINE CLINIC | Age: 37
End: 2022-05-11

## 2022-05-17 DIAGNOSIS — E10.40 TYPE 1 DIABETES MELLITUS WITH DIABETIC NEUROPATHY (HCC): ICD-10-CM

## 2022-05-17 RX ORDER — INSULIN PUMP CONTROLLER
1 EACH MISCELLANEOUS
Qty: 2 EACH | Refills: 0 | Status: SHIPPED | OUTPATIENT
Start: 2022-05-17 | End: 2022-06-15

## 2022-05-17 RX ORDER — INSULIN PUMP CONTROLLER
1 EACH MISCELLANEOUS
Qty: 2 EACH | Refills: 3 | Status: CANCELLED | OUTPATIENT
Start: 2022-05-17

## 2022-05-22 DIAGNOSIS — E10.42 TYPE 1 DIABETES MELLITUS WITH DIABETIC POLYNEUROPATHY (HCC): ICD-10-CM

## 2022-05-23 NOTE — TELEPHONE ENCOUNTER
Per [facility], last fill 4/18, quantity 120 for 30 days. Maldonado Long called requesting a refill of the below medication which has been pended for you:     Requested Prescriptions     Pending Prescriptions Disp Refills    gabapentin (NEURONTIN) 400 MG capsule [Pharmacy Med Name: GABAPENTIN 400 MG CAPSULE] 120 capsule 2     Sig: TAKE 1 CAPSULE BY MOUTH IN THE MORNING, ONE IN THE AFTERNOON, AND 2 AT BEDTIME       Last Appointment Date: 1/11/2022  Next Appointment Date: 6/15/2022    Allergies   Allergen Reactions    Bee Venom Hives     With swelling    Vancomycin Other (See Comments)     From [facility] care plan - reaction unknown.      Lipitor [Atorvastatin] Hives

## 2022-05-26 RX ORDER — GABAPENTIN 400 MG/1
CAPSULE ORAL
Qty: 120 CAPSULE | Refills: 2 | Status: SHIPPED | OUTPATIENT
Start: 2022-05-26

## 2022-06-15 ENCOUNTER — OFFICE VISIT (OUTPATIENT)
Dept: FAMILY MEDICINE CLINIC | Age: 37
End: 2022-06-15
Payer: COMMERCIAL

## 2022-06-15 ENCOUNTER — HOSPITAL ENCOUNTER (OUTPATIENT)
Dept: LAB | Age: 37
Discharge: HOME | End: 2022-06-15
Payer: COMMERCIAL

## 2022-06-15 ENCOUNTER — HOSPITAL ENCOUNTER (OUTPATIENT)
Dept: LAB | Age: 37
Discharge: HOME | End: 2022-06-15

## 2022-06-15 VITALS
HEIGHT: 70 IN | OXYGEN SATURATION: 97 % | DIASTOLIC BLOOD PRESSURE: 82 MMHG | WEIGHT: 168.4 LBS | BODY MASS INDEX: 24.11 KG/M2 | HEART RATE: 93 BPM | TEMPERATURE: 98.9 F | SYSTOLIC BLOOD PRESSURE: 116 MMHG

## 2022-06-15 DIAGNOSIS — Z91.030 ALLERGY TO BEE STING: ICD-10-CM

## 2022-06-15 DIAGNOSIS — E10.42 TYPE 1 DIABETES MELLITUS WITH DIABETIC POLYNEUROPATHY (HCC): ICD-10-CM

## 2022-06-15 DIAGNOSIS — E10.40 TYPE 1 DIABETES MELLITUS WITH DIABETIC NEUROPATHY (HCC): ICD-10-CM

## 2022-06-15 DIAGNOSIS — E10.42 TYPE 1 DIABETES MELLITUS WITH DIABETIC POLYNEUROPATHY (HCC): Primary | ICD-10-CM

## 2022-06-15 DIAGNOSIS — E78.2 MIXED HYPERLIPIDEMIA: ICD-10-CM

## 2022-06-15 PROBLEM — I73.9 PAD (PERIPHERAL ARTERY DISEASE) (HCC): Status: ACTIVE | Noted: 2020-12-07

## 2022-06-15 PROBLEM — L97.509 DIABETIC FOOT ULCER (HCC): Status: ACTIVE | Noted: 2020-12-07

## 2022-06-15 PROBLEM — F17.219 CIGARETTE NICOTINE DEPENDENCE WITH NICOTINE-INDUCED DISORDER: Status: ACTIVE | Noted: 2020-12-07

## 2022-06-15 PROBLEM — L97.412 DIABETIC ULCER OF RIGHT MIDFOOT ASSOCIATED WITH TYPE 1 DIABETES MELLITUS, WITH FAT LAYER EXPOSED (HCC): Status: ACTIVE | Noted: 2020-12-07

## 2022-06-15 PROBLEM — L03.115 CELLULITIS OF RIGHT FOOT: Status: ACTIVE | Noted: 2020-12-07

## 2022-06-15 PROBLEM — E11.621 DIABETIC FOOT ULCER (HCC): Status: ACTIVE | Noted: 2020-12-07

## 2022-06-15 PROBLEM — E10.621 DIABETIC ULCER OF RIGHT MIDFOOT ASSOCIATED WITH TYPE 1 DIABETES MELLITUS, WITH FAT LAYER EXPOSED (HCC): Status: ACTIVE | Noted: 2020-12-07

## 2022-06-15 PROBLEM — M86.071 ACUTE HEMATOGENOUS OSTEOMYELITIS OF RIGHT FOOT (HCC): Status: ACTIVE | Noted: 2020-12-09

## 2022-06-15 LAB
ANION GAP SERPL CALCULATED.3IONS-SCNC: 12 MMOL/L (ref 9–17)
BUN BLDV-MCNC: 14 MG/DL (ref 6–20)
BUN/CREAT BLD: 21 (ref 9–20)
CALCIUM SERPL-MCNC: 9.5 MG/DL (ref 8.6–10.4)
CHLORIDE BLD-SCNC: 100 MMOL/L (ref 98–107)
CO2: 24 MMOL/L (ref 20–31)
CREAT SERPL-MCNC: 0.68 MG/DL (ref 0.7–1.2)
GFR AFRICAN AMERICAN: >60 ML/MIN
GFR NON-AFRICAN AMERICAN: >60 ML/MIN
GFR SERPL CREATININE-BSD FRML MDRD: ABNORMAL ML/MIN/{1.73_M2}
GLUCOSE BLD-MCNC: 300 MG/DL (ref 70–99)
POTASSIUM SERPL-SCNC: 4.2 MMOL/L (ref 3.7–5.3)
SODIUM BLD-SCNC: 136 MMOL/L (ref 135–144)

## 2022-06-15 PROCEDURE — 99213 OFFICE O/P EST LOW 20 MIN: CPT | Performed by: FAMILY MEDICINE

## 2022-06-15 PROCEDURE — 83036 HEMOGLOBIN GLYCOSYLATED A1C: CPT

## 2022-06-15 PROCEDURE — 2022F DILAT RTA XM EVC RTNOPTHY: CPT | Performed by: FAMILY MEDICINE

## 2022-06-15 PROCEDURE — G8427 DOCREV CUR MEDS BY ELIG CLIN: HCPCS | Performed by: FAMILY MEDICINE

## 2022-06-15 PROCEDURE — 3046F HEMOGLOBIN A1C LEVEL >9.0%: CPT | Performed by: FAMILY MEDICINE

## 2022-06-15 PROCEDURE — 99214 OFFICE O/P EST MOD 30 MIN: CPT | Performed by: FAMILY MEDICINE

## 2022-06-15 PROCEDURE — G8420 CALC BMI NORM PARAMETERS: HCPCS | Performed by: FAMILY MEDICINE

## 2022-06-15 PROCEDURE — 82043 UR ALBUMIN QUANTITATIVE: CPT

## 2022-06-15 PROCEDURE — 4004F PT TOBACCO SCREEN RCVD TLK: CPT | Performed by: FAMILY MEDICINE

## 2022-06-15 PROCEDURE — 80048 BASIC METABOLIC PNL TOTAL CA: CPT

## 2022-06-15 PROCEDURE — 36415 COLL VENOUS BLD VENIPUNCTURE: CPT

## 2022-06-15 PROCEDURE — 82570 ASSAY OF URINE CREATININE: CPT

## 2022-06-15 RX ORDER — BLOOD-GLUCOSE SENSOR
EACH MISCELLANEOUS
Qty: 9 EACH | Refills: 1 | Status: SHIPPED | OUTPATIENT
Start: 2022-06-15 | End: 2022-06-22 | Stop reason: SDUPTHER

## 2022-06-15 RX ORDER — INSULIN PMP CART,AUT,G6/7,CNTR
EACH SUBCUTANEOUS
Qty: 1 KIT | Refills: 0 | Status: SHIPPED | OUTPATIENT
Start: 2022-06-15 | End: 2022-07-13 | Stop reason: SDUPTHER

## 2022-06-15 RX ORDER — EPINEPHRINE 0.3 MG/.3ML
INJECTION SUBCUTANEOUS
Qty: 1 EACH | Refills: 1 | Status: SHIPPED | OUTPATIENT
Start: 2022-06-15

## 2022-06-15 RX ORDER — LISINOPRIL 5 MG/1
5 TABLET ORAL DAILY
Qty: 90 TABLET | Refills: 3 | Status: SHIPPED | OUTPATIENT
Start: 2022-06-15

## 2022-06-15 RX ORDER — SIMVASTATIN 20 MG
20 TABLET ORAL EVERY EVENING
Qty: 90 TABLET | Refills: 3 | Status: SHIPPED | OUTPATIENT
Start: 2022-06-15

## 2022-06-15 ASSESSMENT — PATIENT HEALTH QUESTIONNAIRE - PHQ9
SUM OF ALL RESPONSES TO PHQ9 QUESTIONS 1 & 2: 0
SUM OF ALL RESPONSES TO PHQ QUESTIONS 1-9: 0
SUM OF ALL RESPONSES TO PHQ QUESTIONS 1-9: 0
2. FEELING DOWN, DEPRESSED OR HOPELESS: 0
SUM OF ALL RESPONSES TO PHQ QUESTIONS 1-9: 0
1. LITTLE INTEREST OR PLEASURE IN DOING THINGS: 0
SUM OF ALL RESPONSES TO PHQ QUESTIONS 1-9: 0

## 2022-06-15 NOTE — PROGRESS NOTES
[facility] [address] [address]  [address], [address] [place]  [phone]      Mesha eLe is a 40 y.o. male who presents today for his medical conditions/complaints as noted below. Mesha Lee is c/o of Diabetes and Hyperlipidemia      HPI:     Pt here today for follow-up of DM and HYPERLIPIDEMIA. States his glucose have been 150-180's most recently. Has not had as many high's or low's; lowest reading recently has been 72. Highest reading has been 350, usually in the morning, but sometimes after he has only been asleep for 3 hours and he snacked before bed. Sometimes getting low at work when he is more physical; having to move more at work. Has drinks or peanut butter crackers at work for when he's low. Has Dexcom CGM - needs refill of sensors today. Working 12 hour shifts right now. Walking for exercise on the weekends when he can. Just saw eye specialist at [address] (Dr. Sim Christy) for his L eye; will see him in July for his R eye.     Taking Gabapentin 400 mg qAM, qafternoon, and 2 qHS - tolerable most days for his neuropathy sx's. Has more sx's in the afternoon, after work. Denies side effects to that dose.   Needs refill today.              Past Medical History:   Diagnosis Date    Asthma     Diabetes mellitus ([facility] [address]     type 1    Diabetic ketoacidosis associated with type 1 diabetes mellitus ([facility] [address] 04/28/2017    Head injury     Hyperlipidemia     Hypertension     Male erectile dysfunction     Migraine     Neuropathy in diabetes [facility])       Past Surgical History:   Procedure Laterality Date    APPENDECTOMY      TYMPANOSTOMY TUBE PLACEMENT       Family History   Problem Relation Age of Onset    Diabetes Mother         type 1    Heart Disease Mother     Kidney Disease Mother     High Cholesterol Mother     High Blood Pressure Mother     High Blood Pressure Father     Diabetes Father         type 2    Heart Disease Father      Social History     Tobacco Use    Smoking status: Current Some Day Smoker     Packs/day: 0.25     Years: 16.00     Pack years: 4.00     Types: Cigarettes     Start date: 1/1/2005    Smokeless tobacco: Never Used    Tobacco comment: ECLAMB RRT 7/19/17   Substance Use Topics    Alcohol use: Yes     Alcohol/week: 18.0 standard drinks     Types: 18 Cans of beer per week     Comment: has 18 beers in a weekend      Current Outpatient Medications   Medication Sig Dispense Refill    simvastatin (ZOCOR) 20 MG tablet Take 1 tablet by mouth every evening 90 tablet 3    Insulin Disposable Pump (OMNIPOD 5 G6 INTRO, GEN 5,) KIT Inject 1 each as directed every 3 days; 200 units of humalog in each pod last 3 days 1 kit 0    HUMALOG 100 UNIT/ML injection vial Sliding scale or use with omnipod. Max daily dose 65 units 100 mL 3    lisinopril (PRINIVIL;ZESTRIL) 5 MG tablet Take 1 tablet by mouth daily 90 tablet 3    EPINEPHrine (EPIPEN 2-KARY) 0.3 MG/0.3ML SOAJ injection Use as directed for allergic reaction 1 each 1    gabapentin (NEURONTIN) 400 MG capsule TAKE 1 CAPSULE BY MOUTH IN THE MORNING, ONE IN THE AFTERNOON, AND 2 AT BEDTIME 120 capsule 2    Glucagon (GVOKE HYPOPEN 2-PACK) 1 MG/0.2ML SOAJ Inject 1 mg into the skin as needed (hypoglycemia) Inject 1mg under the skin as needed for low blood sugars. 2 Syringe 1    Continuous Blood Gluc Sensor (DEXCOM G6 SENSOR) MISC USE 1 SENSOR EVERY 10 DAYS 9 each 3    Continuous Blood Gluc Transmit (DEXCOM G6 TRANSMITTER) MISC USE 1 TRANSMITTER EVERY 3 MONTHS 1 each 3    Acetone, Urine, Test (KETONE TEST) STRP As need for elevated glucose (Patient not taking: Reported on 1/11/2022) 50 strip 3    Continuous Blood Gluc  (DEXCOM G6 ) JARED 1 Units by Does not apply route daily 1 Device 0     No current facility-administered medications for this visit.      Allergies   Allergen Reactions    Bee Venom Hives     With swelling    Vancomycin Other (See Comments)     From [facility] plan - reaction unknown.  Lipitor [Atorvastatin] Hives       Health Maintenance   Topic Date Due    Varicella vaccine (1 of 2 - 2-dose childhood series) Never done    COVID-19 Vaccine (1) Never done    Hepatitis B vaccine (2 of 3 - Risk 3-dose series) 09/25/1998    Hepatitis C screen  Never done    Pneumococcal 0-64 years Vaccine (2 - PPSV23 or PCV20) 10/21/2016    Lipids  08/08/2021    Diabetic foot exam  03/02/2022    Flu vaccine (Season Ended) 09/01/2022    A1C test (Diabetic or Prediabetic)  09/15/2022    Diabetic retinal exam  05/26/2023    Diabetic microalbuminuria test  06/15/2023    Depression Screen  06/15/2023    DTaP/Tdap/Td vaccine (6 - Td or Tdap) 10/21/2025    HIV screen  Completed    Hepatitis A vaccine  Aged Out    Hib vaccine  Aged Out    Meningococcal (ACWY) vaccine  Aged Out       Subjective:      Review of Systems    Objective:     Vitals:    06/15/22 1557   BP: 116/82   Site: Right Upper Arm   Position: Sitting   Cuff Size: Medium Adult   Pulse: 93   Temp: 98.9 °F (37.2 °C)   TempSrc: Temporal   SpO2: 97%   Weight: 168 lb 6.4 oz (76.4 kg)   Height: 5' 10\" (1.778 m)     Physical Exam  Vitals and nursing note reviewed. Constitutional:       General: He is not in acute distress. Appearance: He is well-developed. HENT:      Head: Normocephalic and atraumatic. Right Ear: Tympanic membrane, ear canal and external ear normal.      Left Ear: Tympanic membrane, ear canal and external ear normal.      Nose: Nose normal.      Mouth/Throat:      Mouth: Mucous membranes are moist.      Pharynx: Oropharynx is clear. No oropharyngeal exudate. Eyes:      Conjunctiva/sclera: Conjunctivae normal.   Cardiovascular:      Rate and Rhythm: Normal rate and regular rhythm. Heart sounds: Normal heart sounds. Pulmonary:      Effort: Pulmonary effort is normal. No respiratory distress. Breath sounds: Normal breath sounds.    Abdominal:      General: Bowel sounds are normal. There is no distension. Palpations: Abdomen is soft. Tenderness: There is no abdominal tenderness. Skin:     General: Skin is warm and dry. Neurological:      Mental Status: He is alert and oriented to person, place, and time. Assessment:      1. Type 1 diabetes mellitus with diabetic polyneuropathy (HCC)  -     Basic Metabolic Panel; Future  -     Insulin Disposable Pump (OMNIPOD 5 G6 INTRO, GEN 5,) KIT; Inject 1 each as directed every 3 days; 200 units of humalog in each pod last 3 days, Disp-1 kit, R-0Normal  -     lisinopril (PRINIVIL;ZESTRIL) 5 MG tablet; Take 1 tablet by mouth daily, Disp-90 tablet, R-3Normal  2. Mixed hyperlipidemia  -     simvastatin (ZOCOR) 20 MG tablet; Take 1 tablet by mouth every evening, Disp-90 tablet, R-3Normal  3. Allergy to bee sting  -     EPINEPHrine (EPIPEN 2-KARY) 0.3 MG/0.3ML SOAJ injection; Use as directed for allergic reaction, Disp-1 each, R-1Normal         Plan:      Return in about 3 months (around 9/15/2022) for f/u DM. Orders Placed This Encounter   Procedures    Basic Metabolic Panel     Standing Status:   Future     Number of Occurrences:   1     Standing Expiration Date:   6/15/2023     Orders Placed This Encounter   Medications    simvastatin (ZOCOR) 20 MG tablet     Sig: Take 1 tablet by mouth every evening     Dispense:  90 tablet     Refill:  3    DISCONTD: Continuous Blood Gluc Sensor (DEXCOM G6 SENSOR) MISC     Sig: USE 1 SENSOR EVERY 10 DAYS     Dispense:  9 each     Refill:  1    Insulin Disposable Pump (OMNIPOD 5 G6 INTRO, GEN 5,) KIT     Sig: Inject 1 each as directed every 3 days; 200 units of humalog in each pod last 3 days     Dispense:  1 kit     Refill:  0    HUMALOG 100 UNIT/ML injection vial     Sig: Sliding scale or use with omnipod.  Max daily dose 65 units     Dispense:  100 mL     Refill:  3    lisinopril (PRINIVIL;ZESTRIL) 5 MG tablet     Sig: Take 1 tablet by mouth daily     Dispense:  90 tablet     Refill:  3    EPINEPHrine (EPIPEN 2-KARY) 0.3 MG/0.3ML SOAJ injection     Sig: Use as directed for allergic reaction     Dispense:  1 each     Refill:  1       Patient given educational materials - see patient instructions. Discussed use, benefit, and side effects of prescribed medications. All patient questions answered. Pt voiced understanding. Reviewed health maintenance.             Electronically signed by John Paul Thompson DO, DO on 6/26/2022 at 11:49 PM

## 2022-06-16 LAB
CREATININE URINE: 160 MG/DL (ref 39–259)
ESTIMATED AVERAGE GLUCOSE: 318 MG/DL
HBA1C MFR BLD: 12.7 % (ref 4–6)
MICROALBUMIN/CREAT 24H UR: 635 MG/L
MICROALBUMIN/CREAT UR-RTO: 397 MCG/MG CREAT

## 2022-06-21 DIAGNOSIS — E10.42 TYPE 1 DIABETES MELLITUS WITH DIABETIC POLYNEUROPATHY (HCC): Primary | ICD-10-CM

## 2022-06-21 DIAGNOSIS — E10.42 DIABETIC POLYNEUROPATHY ASSOCIATED WITH TYPE 1 DIABETES MELLITUS (HCC): ICD-10-CM

## 2022-06-21 NOTE — TELEPHONE ENCOUNTER
Darline Pollard called requesting a refill of the below medication which has been pended for you:     Requested Prescriptions     Pending Prescriptions Disp Refills    Continuous Blood Gluc Transmit (DEXCOM G6 TRANSMITTER) MISC 1 each 0     Sig: USE 1 TRANSMITTER EVERY 3 MONTHS *Patient needs to schedule appt for future refills*       Last Appointment Date: 6/15/2022  Next Appointment Date: 9/20/2022    Allergies   Allergen Reactions    Bee Venom Hives     With swelling    Vancomycin Other (See Comments)     From [facility] care plan - reaction unknown.      Lipitor [Atorvastatin] Hives

## 2022-06-22 RX ORDER — BLOOD-GLUCOSE TRANSMITTER
EACH MISCELLANEOUS
Qty: 1 EACH | Refills: 3 | Status: SHIPPED | OUTPATIENT
Start: 2022-06-22 | End: 2022-07-13 | Stop reason: SDUPTHER

## 2022-06-22 RX ORDER — BLOOD-GLUCOSE SENSOR
EACH MISCELLANEOUS
Qty: 9 EACH | Refills: 3 | Status: SHIPPED | OUTPATIENT
Start: 2022-06-22 | End: 2022-07-13 | Stop reason: SDUPTHER

## 2022-06-22 RX ORDER — BLOOD-GLUCOSE TRANSMITTER
EACH MISCELLANEOUS
Qty: 1 EACH | Refills: 3 | Status: SHIPPED | OUTPATIENT
Start: 2022-06-22 | End: 2022-06-22 | Stop reason: SDUPTHER

## 2022-07-13 ENCOUNTER — TELEPHONE (OUTPATIENT)
Dept: INTERNAL MEDICINE | Age: 37
End: 2022-07-13

## 2022-07-13 ENCOUNTER — OFFICE VISIT (OUTPATIENT)
Dept: DIABETES SERVICES | Age: 37
End: 2022-07-13
Payer: MEDICARE

## 2022-07-13 VITALS
HEIGHT: 70 IN | DIASTOLIC BLOOD PRESSURE: 86 MMHG | WEIGHT: 173 LBS | RESPIRATION RATE: 14 BRPM | SYSTOLIC BLOOD PRESSURE: 120 MMHG | BODY MASS INDEX: 24.77 KG/M2 | HEART RATE: 96 BPM

## 2022-07-13 DIAGNOSIS — E78.2 MIXED HYPERLIPIDEMIA: ICD-10-CM

## 2022-07-13 DIAGNOSIS — E10.42 TYPE 1 DIABETES MELLITUS WITH DIABETIC POLYNEUROPATHY (HCC): Primary | ICD-10-CM

## 2022-07-13 DIAGNOSIS — I10 ESSENTIAL HYPERTENSION: ICD-10-CM

## 2022-07-13 PROCEDURE — 95251 CONT GLUC MNTR ANALYSIS I&R: CPT | Performed by: NURSE PRACTITIONER

## 2022-07-13 PROCEDURE — 99213 OFFICE O/P EST LOW 20 MIN: CPT

## 2022-07-13 PROCEDURE — 2022F DILAT RTA XM EVC RTNOPTHY: CPT | Performed by: NURSE PRACTITIONER

## 2022-07-13 PROCEDURE — G8427 DOCREV CUR MEDS BY ELIG CLIN: HCPCS | Performed by: NURSE PRACTITIONER

## 2022-07-13 PROCEDURE — G8420 CALC BMI NORM PARAMETERS: HCPCS | Performed by: NURSE PRACTITIONER

## 2022-07-13 PROCEDURE — 4004F PT TOBACCO SCREEN RCVD TLK: CPT | Performed by: NURSE PRACTITIONER

## 2022-07-13 PROCEDURE — 99214 OFFICE O/P EST MOD 30 MIN: CPT | Performed by: NURSE PRACTITIONER

## 2022-07-13 PROCEDURE — 3046F HEMOGLOBIN A1C LEVEL >9.0%: CPT | Performed by: NURSE PRACTITIONER

## 2022-07-13 RX ORDER — BLOOD-GLUCOSE SENSOR
EACH MISCELLANEOUS
Qty: 9 EACH | Refills: 3 | Status: SHIPPED | OUTPATIENT
Start: 2022-07-13

## 2022-07-13 RX ORDER — INSULIN PMP CART,AUT,G6/7,CNTR
EACH SUBCUTANEOUS
Qty: 1 KIT | Refills: 0 | Status: SHIPPED | OUTPATIENT
Start: 2022-07-13

## 2022-07-13 RX ORDER — BLOOD-GLUCOSE TRANSMITTER
EACH MISCELLANEOUS
Qty: 1 EACH | Refills: 3 | Status: SHIPPED | OUTPATIENT
Start: 2022-07-13

## 2022-07-13 ASSESSMENT — ENCOUNTER SYMPTOMS
SHORTNESS OF BREATH: 0
RESPIRATORY NEGATIVE: 1
DIARRHEA: 0
ABDOMINAL PAIN: 0

## 2022-07-13 NOTE — TELEPHONE ENCOUNTER
Attempted to complete PA on covermymeds. Key LEEANNA    Received following message:   \"Additional Information Required  OMNIPOD 5 G6 INTRO KIT (GEN 5) <DRUG STRENGTH> EACH is not covered for this Member. For formulary alternatives, please view the Standard or Precision Formulary, whichever this Member is covered by, at [email or web address] or call us at [phone]. \"    Called provided number. Spoke with [employer]. She stated that it is not covered and that \"dexcom is preferred and pt is already on\"    Writer notified that dexcom is a CGM and that Omnipod is an insulin delivery device like an insulin pump. She then pushed through PA and asked the questions for the PA. Completed with her and give writer PA # [id number]. Gave her fax number and she stated that pharmacist would review and fax determination to our office within 7-15 business days.

## 2022-07-13 NOTE — PROGRESS NOTES
07/13/22   Shawn Jade is a 40 y.o. male patient who presents to clinic today for his diabetes. he has a history of PAD, HTN, neuropathy and hyperlipidemia  which contributes to his diabetes. At previous visit insulin was adjusted. he denies any current signs or symptoms of hyper/hypoglycemia. he states they are taking their medications as prescribed without any adverse effects. He is out of cgm and dexcom supplies currently   Diet: Counting carbs  Exercise: None  BS testing: uses cgm daily with success and is adherent to cgm therapy  Issues: supply of insulin  Diabetic foot exam up-to-date: no  Diabetic retinal exam up-to-date: yes  Hypoglycemia as needed treatment: snacks,       High cholesterol-  Takes Zocor and denies any adverse effects with its use.  Watches diet and exercise.      Hypertension-  Takes lisinopril and denies any adverse effects with their use. Watches diet and exercise. Denies any chest pain, dizziness or edema.       Obesity- Working on weight loss.       Past Medical History:   Diagnosis Date    Asthma     Diabetes mellitus ([facility] [address]     type 1    Diabetic ketoacidosis associated with type 1 diabetes mellitus ([facility] [address] 04/28/2017    Head injury     Hyperlipidemia     Hypertension     Male erectile dysfunction     Migraine     Neuropathy in diabetes ([facility] [address]      Family History   Problem Relation Age of Onset    Diabetes Mother         type 1    Heart Disease Mother     Kidney Disease Mother     High Cholesterol Mother     High Blood Pressure Mother     High Blood Pressure Father     Diabetes Father         type 2    Heart Disease Father      Social History     Tobacco Use    Smoking status: Current Some Day Smoker     Packs/day: 0.25     Years: 16.00     Pack years: 4.00     Types: Cigarettes     Start date: 1/1/2005    Smokeless tobacco: Never Used    Tobacco comment: SHARATH RRT 7/19/17   Vaping Use    Vaping Use: Never used   Substance Use Topics    Alcohol use:  Yes Alcohol/week: 18.0 standard drinks     Types: 18 Cans of beer per week     Comment: has 18 beers in a weekend    Drug use: Never     Allergies   Allergen Reactions    Bee Venom Hives     With swelling    Vancomycin Other (See Comments)     From [facility] care plan - reaction unknown.  Lipitor [Atorvastatin] Hives       MEDICATIONS:  Current Outpatient Medications   Medication Sig Dispense Refill    Insulin Disposable Pump (OMNIPOD 5 G6 INTRO, GEN 5,) KIT Inject 1 each as directed every 3 days; 200 units of humalog in each pod last 3 days 1 kit 0    Continuous Blood Gluc Sensor (DEXCOM G6 SENSOR) MISC USE 1 SENSOR EVERY 10 DAYS 9 each 3    Continuous Blood Gluc Transmit (DEXCOM G6 TRANSMITTER) MISC USE 1 TRANSMITTER EVERY 3 MONTHS 1 each 3    simvastatin (ZOCOR) 20 MG tablet Take 1 tablet by mouth every evening 90 tablet 3    HUMALOG 100 UNIT/ML injection vial Sliding scale or use with omnipod. Max daily dose 65 units 100 mL 3    lisinopril (PRINIVIL;ZESTRIL) 5 MG tablet Take 1 tablet by mouth daily 90 tablet 3    gabapentin (NEURONTIN) 400 MG capsule TAKE 1 CAPSULE BY MOUTH IN THE MORNING, ONE IN THE AFTERNOON, AND 2 AT BEDTIME (Patient taking differently: TAKE 2 CAPSULE BY MOUTH IN THE MORNING, ONE IN THE AFTERNOON, AND 2 AT BEDTIME) 120 capsule 2    EPINEPHrine (EPIPEN 2-KARY) 0.3 MG/0.3ML SOAJ injection Use as directed for allergic reaction 1 each 1    Acetone, Urine, Test (KETONE TEST) STRP As need for elevated glucose (Patient not taking: Reported on 1/11/2022) 50 strip 3    Glucagon (GVOKE HYPOPEN 2-PACK) 1 MG/0.2ML SOAJ Inject 1 mg into the skin as needed (hypoglycemia) Inject 1mg under the skin as needed for low blood sugars. 2 Syringe 1    Continuous Blood Gluc  (DEXCOM G6 ) JARED 1 Units by Does not apply route daily 1 Device 0     No current facility-administered medications for this visit. Review ofSymptoms:  Review of Systems   Constitutional: Positive for fatigue. Negative for unexpected weight change. Eyes: Negative for visual disturbance. Respiratory: Negative. Negative for shortness of breath. Cardiovascular: Negative for chest pain and leg swelling. Gastrointestinal: Negative for abdominal pain and diarrhea. Endocrine: Negative for polydipsia, polyphagia and polyuria. Genitourinary: Negative. Musculoskeletal: Negative. Skin: Negative for rash and wound. Neurological: Negative for dizziness, tremors, seizures and headaches. Psychiatric/Behavioral: Negative. Negative for confusion and decreased concentration. Theremainder of a complete 14-point review of systems is negative. Vital Signs: /86 (Site: Right Upper Arm, Position: Sitting, Cuff Size: Medium Adult)   Pulse 96   Resp 14   Ht 5' 10\" (1.778 m)   Wt 173 lb (78.5 kg)   BMI 24.82 kg/m²      Wt Readings from Last 3 Encounters:   07/13/22 173 lb (78.5 kg)   06/15/22 168 lb 6.4 oz (76.4 kg)   01/11/22 182 lb 9.6 oz (82.8 kg)     Body mass index is 24.82 kg/m².   LABS:  Hemoglobin A1C   Date Value Ref Range Status   06/15/2022 12.7 (H) 4.0 - 6.0 % Final   06/14/2021 7.2 (H) 4.0 - 6.0 % Final     Lab Results   Component Value Date    LABMICR 397 (H) 06/15/2022     Lab Results   Component Value Date     06/15/2022    K 4.2 06/15/2022     06/15/2022    CO2 24 06/15/2022    BUN 14 06/15/2022    CREATININE 0.68 (L) 06/15/2022    GLUCOSE 300 (H) 06/15/2022    CALCIUM 9.5 06/15/2022    PROT 6.9 08/08/2020    LABALBU 3.8 01/20/2021    BILITOT 0.3 01/20/2021    ALKPHOS 92 01/20/2021    AST 15 01/20/2021    ALT 12 01/20/2021    LABGLOM >60 06/15/2022    GFRAA >60 06/15/2022     Lab Results   Component Value Date    CHOL 184 08/08/2020    CHOL 235 (H) 12/14/2018    CHOL 282 (H) 02/10/2017     Lab Results   Component Value Date    TRIG 71 08/08/2020    TRIG 159 (H) 12/14/2018    TRIG 361 (H) 02/10/2017     Lab Results   Component Value Date    HDL 60 08/08/2020    HDL 71 12/14/2018    HDL 65 02/10/2017     Lab Results   Component Value Date    LDLCHOLESTEROL 110 08/08/2020    LDLCHOLESTEROL 132 (H) 12/14/2018    LDLCHOLESTEROL 145 (H) 02/10/2017     Lab Results   Component Value Date    VLDL NOT REPORTED 08/08/2020    VLDL NOT REPORTED 12/14/2018    VLDL 72 (H) 02/10/2017     Lab Results   Component Value Date    CHOLHDLRATIO 3.1 08/08/2020    CHOLHDLRATIO 3.3 12/14/2018    CHOLHDLRATIO 4.3 02/10/2017           Physical Exam  Constitutional:       Appearance: He is well-developed. Eyes:      Pupils: Pupils are equal, round, and reactive to light. Cardiovascular:      Rate and Rhythm: Normal rate and regular rhythm. Pulmonary:      Effort: Pulmonary effort is normal.      Breath sounds: Normal breath sounds. Skin:     General: Skin is warm and dry. Findings: No lesion (no lipohypertrophy) or rash. Neurological:      Mental Status: He is alert and oriented to person, place, and time. Sensory: No sensory deficit. Psychiatric:         Speech: Speech normal.         Behavior: Behavior normal.         Thought Content: Thought content normal.         Judgment: Judgment normal.       Visual inspection:  Deformity/amputation: absent  Skin lesions/pre-ulcerative calluses: absent  Edema: right- negative, left- negative    Sensory exam:  Monofilament sensation: abnormal - abnsent bilateral feet  (minimum of 5 random plantar locations tested, avoiding callused areas - > 1 area with absence of sensation is + for neuropathy)    Plus at least one of the following:  Pulses: normal,   Vibration (128 Hz): Absent        ASSESSMENT/PLAN:     Diagnosis Orders   1.  Type 1 diabetes mellitus with diabetic polyneuropathy (HCC)   DIABETES FOOT EXAM    Insulin Disposable Pump (OMNIPOD 5 G6 INTRO, GEN 5,) KIT    Continuous Blood Gluc Sensor (DEXCOM G6 SENSOR) MISC    Continuous Blood Gluc Transmit (DEXCOM G6 TRANSMITTER) MISC    Basic Metabolic Panel    Hemoglobin A1C    Microalbumin, Ur 2. Mixed hyperlipidemia     3. Essential hypertension       Orders Placed This Encounter   Procedures    Basic Metabolic Panel    Hemoglobin A1C    Microalbumin, Ur    HM DIABETES FOOT EXAM     Orders Placed This Encounter   Medications    Insulin Disposable Pump (OMNIPOD 5 G6 INTRO, GEN 5,) KIT     Sig: Inject 1 each as directed every 3 days; 200 units of humalog in each pod last 3 days     Dispense:  1 kit     Refill:  0    Continuous Blood Gluc Sensor (DEXCOM G6 SENSOR) MISC     Sig: USE 1 SENSOR EVERY 10 DAYS     Dispense:  9 each     Refill:  3    Continuous Blood Gluc Transmit (DEXCOM G6 TRANSMITTER) MISC     Sig: USE 1 TRANSMITTER EVERY 3 MONTHS     Dispense:  1 each     Refill:  3     Requested Prescriptions     Signed Prescriptions Disp Refills    Insulin Disposable Pump (OMNIPOD 5 G6 INTRO, GEN 5,) KIT 1 kit 0     Sig: Inject 1 each as directed every 3 days; 200 units of humalog in each pod last 3 days    Continuous Blood Gluc Sensor (DEXCOM G6 SENSOR) MISC 9 each 3     Sig: USE 1 SENSOR EVERY 10 DAYS    Continuous Blood Gluc Transmit (DEXCOM G6 TRANSMITTER) MISC 1 each 3     Sig: USE 1 TRANSMITTER EVERY 3 MONTHS       1. Type 1 diabetes mellitus with diabetic polyneuropathy (HCC)  - Unstable  HbA1C goal is less than 7%. - Fasting blood glucose goal is 70-120mg/dl and postprandial blood sugar goal is less than 180 mg/dl. - Labs reviewed including most recent A1c, microalbumin and kidney function. Repeat labs due in 3 months.    -We discussed in great detail dietary modifications they can make to better improve their blood sugars. -follow up diabetes education completed, all questions answered.  -stressed the importance on not going without insulin pump or if he does run out of supplies he needs to use back up plan basal bolus. -he has tresiba and novolog at home, gave pt extra syringes   CGM report downloaded and reviewed, scanned to media tab. Time in range 14% and hypoglycemia 0%. Predicted A1c per CGM report 10.6%. Insulin pump settings downloaded and reviewed. Scanned into media tab. -will send in for omnipod gen 5  And adjust basal settings   Insulin Instructions  Pump Settings      Last edited by BEATRIS Broderick CNP on 7/13/2022 at 4:52 PM      Basal Rate   Total Basal Dose: 35.7 units/day   Time units/hr   12:00 AM 1.5    3:00 AM 1.4    3:00 PM 1.6      Blood Glucose Target   Time mg/dL   12:00  - 120      Sensitivity Factor   Time mg/dL/unit   12:00 AM 35      Carb Ratio   Time g/unit   12:00 AM 7    3:00 AM 8    3:00 PM 7             Discussed signs and symptoms of hyper/hypoglycemia and how to treat. Encouraged 150 minutes of physical activity per week. Follow a low carbohydrate diet. Encouraged at least 7 hours of sleep. The patient was informed of the goals of diabetes management. This can only be accomplished by watching their diet and exercise levels. We certainly use medicines to help attain these goals. The consequences of not controlling blood sugars were discussed. These include blindness, heart disease, stroke, kidney disease, and possibly need for dialysis. They were told to be careful with their foot care as diabetics often have nerve damage, infections and risk for limb amutations . They also need a dilated eye exam yearly. We discussed the issues of diet, exercise, medication, complication avoidance, reviewed the signs and symptoms of diabetes, hypoglycemic episodes, significance of HbA1C.       -  DIABETES FOOT EXAM  - Insulin Disposable Pump (OMNIPOD 5 G6 INTRO, GEN 5,) KIT; Inject 1 each as directed every 3 days; 200 units of humalog in each pod last 3 days  Dispense: 1 kit; Refill: 0  - Continuous Blood Gluc Sensor (DEXCOM G6 SENSOR) MISC; USE 1 SENSOR EVERY 10 DAYS  Dispense: 9 each; Refill: 3  - Continuous Blood Gluc Transmit (DEXCOM G6 TRANSMITTER) MISC; USE 1 TRANSMITTER EVERY 3 MONTHS  Dispense: 1 each;  Refill: 3  - Basic Metabolic Panel; Future  - Hemoglobin A1C; Future  - Microalbumin, Ur; Future    2. Mixed hyperlipidemia  stable, lipid panel reviewed, continue current medications. Diet and exercise      3. Essential hypertension   stable, continue current medications. Diet and exercise Seek emergent care if chest pain develops. Answered all patient questions. Agrees to follow plan of care and to follow up in 1 months, sooner if needed. Call office if unexplained blood sugars less than 70 occur or above 400. Call office or access MyChart with any further questions or concerns. Be sure to bring glucometer/food log at next appointment. Total time spent reviewing chart, labs, counseling patient and documenting on the date of the encounter: 30 min.       Electronically signed by BEATRIS Grewal CNP on 7/13/2022 at 4:49 PM      (Please note that portions of this note were completed with a voice-recognition program. Efforts were made to edit the dictation but occasionally words are mis-transcribed.)

## 2022-08-17 ENCOUNTER — TELEMEDICINE (OUTPATIENT)
Dept: DIABETES SERVICES | Age: 37
End: 2022-08-17
Payer: COMMERCIAL

## 2022-08-17 DIAGNOSIS — I10 ESSENTIAL HYPERTENSION: ICD-10-CM

## 2022-08-17 DIAGNOSIS — E78.2 MIXED HYPERLIPIDEMIA: ICD-10-CM

## 2022-08-17 DIAGNOSIS — E10.42 TYPE 1 DIABETES MELLITUS WITH DIABETIC POLYNEUROPATHY (HCC): Primary | ICD-10-CM

## 2022-08-17 PROCEDURE — G8427 DOCREV CUR MEDS BY ELIG CLIN: HCPCS | Performed by: NURSE PRACTITIONER

## 2022-08-17 PROCEDURE — 99214 OFFICE O/P EST MOD 30 MIN: CPT | Performed by: NURSE PRACTITIONER

## 2022-08-17 PROCEDURE — 3046F HEMOGLOBIN A1C LEVEL >9.0%: CPT | Performed by: NURSE PRACTITIONER

## 2022-08-17 PROCEDURE — 95251 CONT GLUC MNTR ANALYSIS I&R: CPT | Performed by: NURSE PRACTITIONER

## 2022-08-17 PROCEDURE — 2022F DILAT RTA XM EVC RTNOPTHY: CPT | Performed by: NURSE PRACTITIONER

## 2022-08-17 ASSESSMENT — ENCOUNTER SYMPTOMS
SHORTNESS OF BREATH: 0
ABDOMINAL PAIN: 0
RESPIRATORY NEGATIVE: 1
DIARRHEA: 0

## 2022-08-17 NOTE — PROGRESS NOTES
denies any signs or symptoms of hyper/hypoglycemia. he states they are taking their medications as prescribed without any adverse effects. Diet: Counting carbs  Exercise: None  BS testing: uses cgm daily with success and is adherent to cgm therapy  Issues: supply of insulin  Diabetic foot exam up-to-date: no  Diabetic retinal exam up-to-date: yes  Hypoglycemia as needed treatment: snacks,     High cholesterol-  Takes Zocor and denies any adverse effects with its use. Watches diet and exercise. Hypertension-  Takes lisinopril and denies any adverse effects with their use. Watches diet and exercise. Denies any chest pain, dizziness or edema. Obesity- Working on weight loss. Past Medical History:   Diagnosis Date    Asthma     Diabetes mellitus ([facility]     type 1    Diabetic ketoacidosis associated with type 1 diabetes mellitus ([facility] 04/28/2017    Head injury     Hyperlipidemia     Hypertension     Male erectile dysfunction     Migraine     Neuropathy in diabetes ([facility]      Family History   Problem Relation Age of Onset    Diabetes Mother         type 1    Heart Disease Mother     Kidney Disease Mother     High Cholesterol Mother     High Blood Pressure Mother     High Blood Pressure Father     Diabetes Father         type 2    Heart Disease Father      Social History     Tobacco Use    Smoking status: Some Days     Packs/day: 0.25     Years: 16.00     Pack years: 4.00     Types: Cigarettes     Start date: 1/1/2005    Smokeless tobacco: Never    Tobacco comments:     ECLAMB RRT 7/19/17   Vaping Use    Vaping Use: Never used   Substance Use Topics    Alcohol use: Yes     Alcohol/week: 18.0 standard drinks     Types: 18 Cans of beer per week     Comment: has 18 beers in a weekend    Drug use: Never     Allergies   Allergen Reactions    Bee Venom Hives     With swelling    Vancomycin Other (See Comments)     From [facility] care plan - reaction unknown.      Lipitor [Atorvastatin] Hives       MEDICATIONS:  Current Outpatient Medications   Medication Sig Dispense Refill    simvastatin (ZOCOR) 20 MG tablet Take 1 tablet by mouth every evening 90 tablet 3    HUMALOG 100 UNIT/ML injection vial Sliding scale or use with omnipod. Max daily dose 65 units 100 mL 3    lisinopril (PRINIVIL;ZESTRIL) 5 MG tablet Take 1 tablet by mouth daily 90 tablet 3    EPINEPHrine (EPIPEN 2-KARY) 0.3 MG/0.3ML SOAJ injection Use as directed for allergic reaction 1 each 1    gabapentin (NEURONTIN) 400 MG capsule TAKE 1 CAPSULE BY MOUTH IN THE MORNING, ONE IN THE AFTERNOON, AND 2 AT BEDTIME (Patient taking differently: TAKE 2 CAPSULE BY MOUTH IN THE MORNING, ONE IN THE AFTERNOON, AND 2 AT BEDTIME) 120 capsule 2    Insulin Disposable Pump (OMNIPOD 5 G6 INTRO, GEN 5,) KIT Inject 1 each as directed every 3 days; 200 units of humalog in each pod last 3 days 1 kit 0    Continuous Blood Gluc Sensor (DEXCOM G6 SENSOR) MISC USE 1 SENSOR EVERY 10 DAYS 9 each 3    Continuous Blood Gluc Transmit (DEXCOM G6 TRANSMITTER) MISC USE 1 TRANSMITTER EVERY 3 MONTHS 1 each 3    Acetone, Urine, Test (KETONE TEST) STRP As need for elevated glucose (Patient not taking: Reported on 1/11/2022) 50 strip 3    Glucagon (GVOKE HYPOPEN 2-PACK) 1 MG/0.2ML SOAJ Inject 1 mg into the skin as needed (hypoglycemia) Inject 1mg under the skin as needed for low blood sugars. 2 Syringe 1    Continuous Blood Gluc  (DEXCOM G6 ) JARED 1 Units by Does not apply route daily 1 Device 0     No current facility-administered medications for this visit. Review ofSymptoms:  Review of Systems   Constitutional:  Positive for fatigue. Negative for unexpected weight change. Eyes:  Negative for visual disturbance. Respiratory: Negative. Negative for shortness of breath. Cardiovascular:  Negative for chest pain and leg swelling. Gastrointestinal:  Negative for abdominal pain and diarrhea. Endocrine: Negative for polydipsia, polyphagia and polyuria. Genitourinary: Negative. Musculoskeletal: Negative. Skin:  Negative for rash and wound. Neurological:  Negative for dizziness, tremors, seizures and headaches. Psychiatric/Behavioral: Negative. Negative for confusion and decreased concentration. Theremainder of a complete 14-point review of systems is negative. Patient-Reported Vitals 8/17/2022   Patient-Reported Weight 175lb   Patient-Reported Height 5 10   Patient-Reported Temperature -           Wt Readings from Last 3 Encounters:   07/13/22 173 lb (78.5 kg)   06/15/22 168 lb 6.4 oz (76.4 kg)   01/11/22 182 lb 9.6 oz (82.8 kg)     There is no height or weight on file to calculate BMI.   LABS:  Hemoglobin A1C   Date Value Ref Range Status   06/15/2022 12.7 (H) 4.0 - 6.0 % Final   06/14/2021 7.2 (H) 4.0 - 6.0 % Final     Lab Results   Component Value Date    LABMICR 397 (H) 06/15/2022     Lab Results   Component Value Date     06/15/2022    K 4.2 06/15/2022     06/15/2022    CO2 24 06/15/2022    BUN 14 06/15/2022    CREATININE 0.68 (L) 06/15/2022    GLUCOSE 300 (H) 06/15/2022    CALCIUM 9.5 06/15/2022    PROT 6.9 08/08/2020    LABALBU 3.8 01/20/2021    BILITOT 0.3 01/20/2021    ALKPHOS 92 01/20/2021    AST 15 01/20/2021    ALT 12 01/20/2021    LABGLOM >60 06/15/2022    GFRAA >60 06/15/2022     Lab Results   Component Value Date    CHOL 184 08/08/2020    CHOL 235 (H) 12/14/2018    CHOL 282 (H) 02/10/2017     Lab Results   Component Value Date    TRIG 71 08/08/2020    TRIG 159 (H) 12/14/2018    TRIG 361 (H) 02/10/2017     Lab Results   Component Value Date    HDL 60 08/08/2020    HDL 71 12/14/2018    HDL 65 02/10/2017     Lab Results   Component Value Date    LDLCHOLESTEROL 110 08/08/2020    LDLCHOLESTEROL 132 (H) 12/14/2018    LDLCHOLESTEROL 145 (H) 02/10/2017     Lab Results   Component Value Date    VLDL NOT REPORTED 08/08/2020    VLDL NOT REPORTED 12/14/2018    VLDL 72 (H) 02/10/2017     Lab Results   Component Value Date    CHOLHDLRATIO 3.1 08/08/2020 goals of diabetes management. This can only be accomplished by watching their diet and exercise levels. We certainly use medicines to help attain these goals. The consequences of not controlling blood sugars were discussed. These include blindness, heart disease, stroke, kidney disease, and possibly need for dialysis. They were told to be careful with their foot care as diabetics often have nerve damage, infections and risk for limb amutations . They also need a dilated eye exam yearly. We discussed the issues of diet, exercise, medication, complication avoidance, reviewed the signs and symptoms of diabetes, hypoglycemic episodes, significance of HbA1C.       2. Mixed hyperlipidemia  stable, lipid panel reviewed, continue current medications. Diet and exercise      3. Essential hypertension   stable, continue current medications. Diet and exercise Seek emergent care if chest pain develops. Answered all patient questions. Agrees to follow plan of care and to follow up in 2 months, sooner if needed. Call office if unexplained blood sugars less than 70 occur or above 400. Call office or access MyChart with any further questions or concerns. Be sure to bring glucometer/food log at next appointment. Nonnie Bills, was evaluated through a synchronous (real-time)-video encounter. The patient (or guardian if applicable) is aware that this is a billable service, which includes applicable co-pays. The virtual visit was conducted with patient's (and/or legal guardians) consent. Visit was conducted pursuant to emergency declaration under the [employer] act and the [place], [address] [place] [address] waiver authority and the coronavirus preparedness and response supplemental appropriations act. Patient identification was verified, and a caregiver was present when appropriate. The patient was located in a state where the provider was licensed to provide care.      Patient identification was verified at the start of the visit: Yes    Total time spent for this encounter:  30 min. Services were provided through a video synchronous discussion virtually to substitute for in-person clinic visit. Patient and provider were located at their individual homes. --BEATRIS Martin CNP on 8/17/2022 at 5:42 PM    An electronic signature was used to authenticate this note.       Electronically signed by BEATRIS Martin CNP on 8/17/2022 at 5:42 PM      (Please note that portions of this note were completed with a voice-recognition program. Efforts were made to edit the dictation but occasionally words are mis-transcribed.)

## 2022-08-26 ENCOUNTER — HOSPITAL ENCOUNTER (OUTPATIENT)
Dept: GENERAL RADIOLOGY | Age: 37
Discharge: HOME | End: 2022-08-28
Payer: COMMERCIAL

## 2022-08-26 ENCOUNTER — OFFICE VISIT (OUTPATIENT)
Dept: FAMILY MEDICINE CLINIC | Age: 37
End: 2022-08-26
Payer: MEDICARE

## 2022-08-26 VITALS
OXYGEN SATURATION: 99 % | HEART RATE: 85 BPM | BODY MASS INDEX: 24.91 KG/M2 | WEIGHT: 174 LBS | HEIGHT: 70 IN | RESPIRATION RATE: 18 BRPM | SYSTOLIC BLOOD PRESSURE: 140 MMHG | DIASTOLIC BLOOD PRESSURE: 90 MMHG

## 2022-08-26 DIAGNOSIS — S93.491D SPRAIN OF POSTERIOR TALOFIBULAR LIGAMENT OF RIGHT ANKLE, SUBSEQUENT ENCOUNTER: Primary | ICD-10-CM

## 2022-08-26 DIAGNOSIS — S93.491D SPRAIN OF POSTERIOR TALOFIBULAR LIGAMENT OF RIGHT ANKLE, SUBSEQUENT ENCOUNTER: ICD-10-CM

## 2022-08-26 PROCEDURE — G8420 CALC BMI NORM PARAMETERS: HCPCS | Performed by: NURSE PRACTITIONER

## 2022-08-26 PROCEDURE — 4004F PT TOBACCO SCREEN RCVD TLK: CPT | Performed by: NURSE PRACTITIONER

## 2022-08-26 PROCEDURE — G8427 DOCREV CUR MEDS BY ELIG CLIN: HCPCS | Performed by: NURSE PRACTITIONER

## 2022-08-26 PROCEDURE — 73610 X-RAY EXAM OF ANKLE: CPT

## 2022-08-26 PROCEDURE — 99213 OFFICE O/P EST LOW 20 MIN: CPT

## 2022-08-26 PROCEDURE — L4387 NON-PNEUM WALK BOOT PRE OTS: HCPCS | Performed by: NURSE PRACTITIONER

## 2022-08-26 PROCEDURE — 99214 OFFICE O/P EST MOD 30 MIN: CPT | Performed by: NURSE PRACTITIONER

## 2022-08-26 RX ORDER — TRAMADOL HYDROCHLORIDE 50 MG/1
50 TABLET ORAL EVERY 6 HOURS PRN
Qty: 20 TABLET | Refills: 0 | Status: SHIPPED | OUTPATIENT
Start: 2022-08-26 | End: 2022-08-31

## 2022-08-26 ASSESSMENT — PATIENT HEALTH QUESTIONNAIRE - PHQ9
SUM OF ALL RESPONSES TO PHQ9 QUESTIONS 1 & 2: 0
SUM OF ALL RESPONSES TO PHQ QUESTIONS 1-9: 0
1. LITTLE INTEREST OR PLEASURE IN DOING THINGS: 0
SUM OF ALL RESPONSES TO PHQ QUESTIONS 1-9: 0
SUM OF ALL RESPONSES TO PHQ QUESTIONS 1-9: 0
2. FEELING DOWN, DEPRESSED OR HOPELESS: 0
SUM OF ALL RESPONSES TO PHQ QUESTIONS 1-9: 0

## 2022-08-26 NOTE — LETTER
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 If you have any questions or concerns, please don't hesitate to call.     Sincerely,        BEATRIS Burgess CNP

## 2022-08-26 NOTE — PROGRESS NOTES
Subjective:      Patient ID: Dodie Edwards is a 40 y.o. male coming in for   Chief Complaint   Patient presents with    Ankle Pain     ER follow up. Right ankle pain/sprain. 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 This is not workers comp.using ace wrap PRN as well as ice. Ankle Pain   Incident onset: 8/18/22 twisted ankle. 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 The injury mechanism was a twisting injury. Pain location: right lateral ankle. The pain is at a severity of 6/10. He reports no foreign bodies present. The symptoms are aggravated by weight bearing, movement and palpation. Treatments tried: ace wrap, motrin. Review of Systems   All other systems reviewed and are negative. Objective:BP (!) 140/90   Pulse 85   Resp 18   Ht 5' 10\" (1.778 m)   Wt 174 lb (78.9 kg)   SpO2 99%   BMI 24.97 kg/m²      Physical Exam  Vitals and nursing note reviewed. Constitutional:       General: He is not in acute distress. Appearance: Normal appearance. Pulmonary:      Effort: Pulmonary effort is normal.   Musculoskeletal:        Feet:    Feet:      Comments: Pain/swelling on lateral ankle    Neurological:      General: No focal deficit present. Mental Status: He is alert and oriented to person, place, and time. Assessment:      1.  Sprain of posterior talofibular ligament of right ankle, subsequent encounter           Plan:    -will repeat xray today.   -walking boot applied  -continue with RICE therapy  -tramadol given for severe pain  -f/u with podiatry    Orders Placed This Encounter   Procedures    XR ANKLE RIGHT (MIN 3 VIEWS)     Standing Status:   Future     Number of Occurrences:   1     Standing Expiration Date:   8/26/2023     Order Specific Question:   Reason for exam:     Answer:   twisted right ankle 8/18/22, xray at outside facility showed possible avulsion fracture, age of this undetermined. Belinda Ashby DPM, Podiatry, [place]     Referral Priority:   Routine     Referral Type:   Eval and Treat     Referral Reason:   Specialty Services Required     Referred to Provider:   Fortino Roland DPM     Requested Specialty:   Podiatry     Number of Visits Requested:   1    Non-pneum walk boot pre ots     Patient was prescribed a [employer] Walking [place]. The right  foot/ankle will require stabilization / immobilization from this semi-rigid / rigid orthosis to improve their function. The orthosis will assist in protecting the affected area, provide functional support and facilitate healing. The patient was educated and fit by a healthcare professional with expert knowledge and specialization in brace application while under the direct supervision of the physician. Verbal and written instructions for the use of and application of this item were provided. They were instructed to contact the office immediately should the brace result in increased pain, decreased sensation, increased swelling or worsening of the condition. Outpatient Encounter Medications as of 8/26/2022   Medication Sig Dispense Refill    traMADol (ULTRAM) 50 MG tablet Take 1 tablet by mouth every 6 hours as needed for Pain for up to 5 days. Intended supply: 5 days. Take lowest dose possible to manage pain 20 tablet 0    Insulin Disposable Pump (OMNIPOD 5 G6 INTRO, GEN 5,) KIT Inject 1 each as directed every 3 days; 200 units of humalog in each pod last 3 days 1 kit 0    Continuous Blood Gluc Sensor (DEXCOM G6 SENSOR) MISC USE 1 SENSOR EVERY 10 DAYS 9 each 3    Continuous Blood Gluc Transmit (DEXCOM G6 TRANSMITTER) MISC USE 1 TRANSMITTER EVERY 3 MONTHS 1 each 3    simvastatin (ZOCOR) 20 MG tablet Take 1 tablet by mouth every evening 90 tablet 3    HUMALOG 100 UNIT/ML injection vial Sliding scale or use with omnipod.  Max daily dose 65 units 100 mL 3    lisinopril (PRINIVIL;ZESTRIL) 5 MG tablet Take 1 tablet by mouth daily 90 tablet 3    EPINEPHrine (EPIPEN 2-KARY) 0.3 MG/0.3ML SOAJ injection Use as directed for allergic reaction 1 each 1    gabapentin (NEURONTIN) 400 MG capsule TAKE 1 CAPSULE BY MOUTH IN THE MORNING, ONE IN THE AFTERNOON, AND 2 AT BEDTIME (Patient taking differently: TAKE 2 CAPSULE BY MOUTH IN THE MORNING, ONE IN THE AFTERNOON, AND 2 AT BEDTIME) 120 capsule 2    Acetone, Urine, Test (KETONE TEST) STRP As need for elevated glucose 50 strip 3    Glucagon (GVOKE HYPOPEN 2-PACK) 1 MG/0.2ML SOAJ Inject 1 mg into the skin as needed (hypoglycemia) Inject 1mg under the skin as needed for low blood sugars. 2 Syringe 1    Continuous Blood Gluc  (DEXCOM G6 ) JARED 1 Units by Does not apply route daily 1 Device 0     No facility-administered encounter medications on file as of 8/26/2022.             Zully Crow, APRN - CNP

## 2022-09-02 ENCOUNTER — OFFICE VISIT (OUTPATIENT)
Dept: PODIATRY | Age: 37
End: 2022-09-02
Payer: COMMERCIAL

## 2022-09-02 ENCOUNTER — HOSPITAL ENCOUNTER (OUTPATIENT)
Dept: GENERAL RADIOLOGY | Age: 37
Discharge: HOME | End: 2022-09-04
Payer: COMMERCIAL

## 2022-09-02 VITALS
BODY MASS INDEX: 25.31 KG/M2 | DIASTOLIC BLOOD PRESSURE: 84 MMHG | SYSTOLIC BLOOD PRESSURE: 138 MMHG | HEART RATE: 84 BPM | RESPIRATION RATE: 20 BRPM | WEIGHT: 176.4 LBS

## 2022-09-02 DIAGNOSIS — S93.601A FOOT SPRAIN, RIGHT, INITIAL ENCOUNTER: ICD-10-CM

## 2022-09-02 DIAGNOSIS — M25.473 ANKLE EDEMA: ICD-10-CM

## 2022-09-02 DIAGNOSIS — E10.40 TYPE 1 DIABETES MELLITUS WITH DIABETIC NEUROPATHY (HCC): ICD-10-CM

## 2022-09-02 DIAGNOSIS — S93.491D SPRAIN OF ANTERIOR TALOFIBULAR LIGAMENT OF RIGHT ANKLE, SUBSEQUENT ENCOUNTER: Primary | ICD-10-CM

## 2022-09-02 PROCEDURE — 99213 OFFICE O/P EST LOW 20 MIN: CPT | Performed by: PODIATRIST

## 2022-09-02 PROCEDURE — 73630 X-RAY EXAM OF FOOT: CPT

## 2022-09-02 PROCEDURE — 4004F PT TOBACCO SCREEN RCVD TLK: CPT | Performed by: PODIATRIST

## 2022-09-02 PROCEDURE — 3046F HEMOGLOBIN A1C LEVEL >9.0%: CPT | Performed by: PODIATRIST

## 2022-09-02 PROCEDURE — G8427 DOCREV CUR MEDS BY ELIG CLIN: HCPCS | Performed by: PODIATRIST

## 2022-09-02 PROCEDURE — G8419 CALC BMI OUT NRM PARAM NOF/U: HCPCS | Performed by: PODIATRIST

## 2022-09-02 PROCEDURE — 2022F DILAT RTA XM EVC RTNOPTHY: CPT | Performed by: PODIATRIST

## 2022-09-02 PROCEDURE — 99204 OFFICE O/P NEW MOD 45 MIN: CPT | Performed by: PODIATRIST

## 2022-09-02 NOTE — PROGRESS NOTES
DO   lisinopril (PRINIVIL;ZESTRIL) 5 MG tablet Take 1 tablet by mouth daily 6/15/22  Yes Elmer Martines, DO   EPINEPHrine (EPIPEN 2-KARY) 0.3 MG/0.3ML SOAJ injection Use as directed for allergic reaction 6/15/22  Yes Elmer Martines, DO   Acetone, Urine, Test (KETONE TEST) STRP As need for elevated glucose 10/13/21  Yes BEATRIS Luis CNP   Glucagon (GVOKE HYPOPEN 2-PACK) 1 MG/0.2ML SOAJ Inject 1 mg into the skin as needed (hypoglycemia) Inject 1mg under the skin as needed for low blood sugars. 7/7/21  Yes BEATRIS Luis CNP   Continuous Blood Gluc  ([address]) [address] 1 Units by Does not apply route daily 3/3/21  Yes BEATRIS Luis CNP   gabapentin (NEURONTIN) 400 MG capsule TAKE 1 CAPSULE BY MOUTH IN THE MORNING, ONE IN THE AFTERNOON, AND 2 AT BEDTIME  Patient taking differently: TAKE 2 CAPSULE BY MOUTH IN THE MORNING, ONE IN THE AFTERNOON, AND 2 AT BEDTIME 5/26/22 8/26/22  Kaycee Torres DO       Past Surgical History:   Procedure Laterality Date    APPENDECTOMY      TYMPANOSTOMY TUBE PLACEMENT         Family History   Problem Relation Age of Onset    Diabetes Mother         type 1    Heart Disease Mother     Kidney Disease Mother     High Cholesterol Mother     High Blood Pressure Mother     High Blood Pressure Father     Diabetes Father         type 2    Heart Disease Father        Social History     Tobacco Use    Smoking status: Some Days     Packs/day: 0.25     Years: 16.00     Pack years: 4.00     Types: Cigarettes     Start date: 1/1/2005    Smokeless tobacco: Never    Tobacco comments:     ECLAMB RRT 7/19/17   Substance Use Topics    Alcohol use: Yes     Alcohol/week: 18.0 standard drinks     Types: 18 Cans of beer per week     Comment: has 18 beers in a weekend       ROS: All 14 ROS systems reviewed and pertinent positives noted above, all others negative.     Lower Extremity Physical Examination:     Vitals:   Vitals:    09/02/22 0842   BP: 138/84   Pulse: 84   Resp: 20     General: AAO x 3 in NAD. Vascular: DP and PT pulses palpable 2/4, bilateral.  CFT <3 seconds, bilateral.  Hair growth present to the level of the digits, bilateral.  Edema dorsal medial midfoot to ankle area. Varicosities absent, bilateral. Erythema absent, bilateral. Distal Rubor absent bilateral.  Temperature within normal limits bilateral. Hyperpigmentation absent bilateral. No atrophic skin. Neurological: Sensation Impaired to light touch to level of digits, bilateral.  Protective sensation intact  5/10 sites via 5.07/10g Henderson-Erika Monofilament, bilateral.  negative Tinel's, bilateral.  negative Valleix sign, bilateral.  Vibratory abnormal  bilateral.  Reflexes Decreased bilateral.  Paresthesias positive. Dysthesias negative. Sharp/dull abnormal  bilateral.   Musculoskeletal: Muscle strength 5/5, Bilateral.  Pain present upon palpation of ATFL, generalized anterior ankle and dorsal talo navicular area Right. within normal limits medial longitudinal arch, Bilateral.  Ankle ROM within normal limits,Bilateral.  1st MPJ ROM within normal limits, Bilateral.  Dorsally contracted digits absent digits none, Bilateral. Other foot deformities none. Integument: Warm, dry, supple, bilateral.  Open lesion absent, bilateral.  Interdigital maceration absent to web spaces bilateral.  Nails within normal limits. Fissures absent, bilateral. Hyperkeratotic tissue is absent. Xray: ankle unremarkable, questionable injury R navicular        Asessment: Patient is a 40 y.o. male with:    Diagnosis Orders   1. Sprain of anterior talofibular ligament of right ankle, subsequent encounter  Amb External Referral To Physical Therapy      2. Ankle edema  Amb External Referral To Physical Therapy      3.  Foot sprain, right, initial encounter  Amb External Referral To Physical Therapy    XR FOOT RIGHT (MIN 3 VIEWS)      4. Type 1 diabetes mellitus with diabetic neuropathy ([facility]            Plan: Patient examined and evaluated. Current condition and treatment options discussed in detail. Orders Placed This Encounter   Procedures    XR FOOT RIGHT (MIN 3 VIEWS)     Standing Status:   Future     Number of Occurrences:   1     Standing Expiration Date:   9/3/2023     Scheduling Instructions:      WB    Amb External Referral To Physical Therapy     Referral Priority:   Routine     Referral Type:   Consult for Advice and Opinion     Referral Reason:   Specialty Services Required     Requested Specialty:   Physical Therapist     Number of Visits Requested:   1   Advise to decrease activtiy due to changes on new xray, set up CT scan to rule out acute vs chronic changes to navicular bone. Orders Placed This Encounter   Medications    diclofenac (VOLTAREN) 50 MG EC tablet     Sig: Take 1 tablet by mouth 3 times daily (with meals)     Dispense:  60 tablet     Refill:  0   DM foot ed and exam  Patient will continue use of cam walker outside of work at all times weightbearing. Patient is moderate level medical decision making. Patient undiagnosed new problem with uncertain prognosis. Patient had 2 new test ordered 1 test reviewed from the provider in 1 new prescription given. Patient my risk morbidity due to diabetic neuropathic with possible breakdown at the talonavicular joint but risk for long-term chronic foot deformity  Continue range of motion light stretching and icing to the right foot and ankle. X rays reviewed with the pt in detail. All questions answered. Contact office with any questions/problems/concerns. RTC in 2week(s).

## 2022-09-20 ENCOUNTER — TELEPHONE (OUTPATIENT)
Dept: FAMILY MEDICINE CLINIC | Age: 37
End: 2022-09-20

## 2022-09-20 NOTE — TELEPHONE ENCOUNTER
Attempted to reach patient regarding missed appointment on 9/20/22. Unable to contact at this time. Left message to reschedule.

## 2022-09-23 ENCOUNTER — HOSPITAL ENCOUNTER (OUTPATIENT)
Dept: CT IMAGING | Age: 37
Discharge: HOME | End: 2022-09-25
Payer: COMMERCIAL

## 2022-09-23 DIAGNOSIS — S93.601A FOOT SPRAIN, RIGHT, INITIAL ENCOUNTER: ICD-10-CM

## 2022-09-23 DIAGNOSIS — S93.491D SPRAIN OF ANTERIOR TALOFIBULAR LIGAMENT OF RIGHT ANKLE, SUBSEQUENT ENCOUNTER: ICD-10-CM

## 2022-09-23 PROCEDURE — 73700 CT LOWER EXTREMITY W/O DYE: CPT

## 2022-09-26 ENCOUNTER — OFFICE VISIT (OUTPATIENT)
Dept: PODIATRY | Age: 37
End: 2022-09-26
Payer: MEDICARE

## 2022-09-26 VITALS
HEART RATE: 99 BPM | RESPIRATION RATE: 16 BRPM | WEIGHT: 170 LBS | HEIGHT: 70 IN | BODY MASS INDEX: 24.34 KG/M2 | OXYGEN SATURATION: 99 % | SYSTOLIC BLOOD PRESSURE: 138 MMHG | DIASTOLIC BLOOD PRESSURE: 86 MMHG

## 2022-09-26 DIAGNOSIS — S92.251S CLOSED DISPLACED FRACTURE OF NAVICULAR BONE OF RIGHT FOOT, SEQUELA: Primary | ICD-10-CM

## 2022-09-26 DIAGNOSIS — E10.40 TYPE 1 DIABETES MELLITUS WITH DIABETIC NEUROPATHY (HCC): ICD-10-CM

## 2022-09-26 PROCEDURE — 4004F PT TOBACCO SCREEN RCVD TLK: CPT | Performed by: PODIATRIST

## 2022-09-26 PROCEDURE — 2022F DILAT RTA XM EVC RTNOPTHY: CPT | Performed by: PODIATRIST

## 2022-09-26 PROCEDURE — G8427 DOCREV CUR MEDS BY ELIG CLIN: HCPCS | Performed by: PODIATRIST

## 2022-09-26 PROCEDURE — 3046F HEMOGLOBIN A1C LEVEL >9.0%: CPT | Performed by: PODIATRIST

## 2022-09-26 PROCEDURE — 99214 OFFICE O/P EST MOD 30 MIN: CPT | Performed by: PODIATRIST

## 2022-09-26 PROCEDURE — 99213 OFFICE O/P EST LOW 20 MIN: CPT | Performed by: PODIATRIST

## 2022-09-26 PROCEDURE — G8420 CALC BMI NORM PARAMETERS: HCPCS | Performed by: PODIATRIST

## 2022-09-26 NOTE — LETTER
[address] Pod[facility] department of [place] [address]  Phone: [phone]  Fax: [phone]    Gissel Clifford        September 26, 2022     Patient: Mely Sanchez   YOB: 1985   Date of Visit: 9/26/2022       To Whom it May Concern: Mely Sanchez was seen in my clinic on 9/26/2022. He may return to work on 9/27/22. If you have any questions or concerns, please don't hesitate to call.     Sincerely,         Gissel Oh DPM

## 2022-09-26 NOTE — PROGRESS NOTES
Subjective: Rona Evangelista is a 40 y.o. male who presents to the office today complaining of R foot and ankle pain. Pain is rated 4 out of 10 and is described as intermittent. Pain worse after long day at work. Sharp and throbbing at times. .  Patient still been active working. Motrin and Tylenol. .  Currently denies F/C/N/V. Allergies   Allergen Reactions    Bee Venom Hives     With swelling    Vancomycin Other (See Comments)     From [facility] care plan - reaction unknown. Lipitor [Atorvastatin] Hives       Past Medical History:   Diagnosis Date    Asthma     Diabetes mellitus ([facility]     type 1    Diabetic ketoacidosis associated with type 1 diabetes mellitus ([facility] 04/28/2017    Head injury     Hyperlipidemia     Hypertension     Male erectile dysfunction     Migraine     Neuropathy in diabetes [facility])        Prior to Admission medications    Medication Sig Start Date End Date Taking? Authorizing Provider   diclofenac (VOLTAREN) 50 MG EC tablet Take 1 tablet by mouth 3 times daily (with meals) 9/2/22  Yes Maryuri Blanco DPM   Insulin Disposable Pump (OMNIPOD 5 G6 INTRO, GEN 5,) KIT Inject 1 each as directed every 3 days; 200 units of humalog in each pod last 3 days 7/13/22  Yes BEATRIS Anand CNP   Continuous Blood Gluc Sensor (DEXCOM G6 SENSOR) MISC USE 1 SENSOR EVERY 10 DAYS 7/13/22  Yes BEATRIS Anand CNP   Continuous Blood Gluc Transmit (DEXCOM G6 TRANSMITTER) MISC USE 1 TRANSMITTER EVERY 3 MONTHS 7/13/22  Yes BEATRIS Anand CNP   simvastatin (ZOCOR) 20 MG tablet Take 1 tablet by mouth every evening 6/15/22  Yes Nga Martines, DO   HUMALOG 100 UNIT/ML injection vial Sliding scale or use with omnipod.  Max daily dose 65 units 6/15/22  Yes Sheryl Martines, DO   lisinopril (PRINIVIL;ZESTRIL) 5 MG tablet Take 1 tablet by mouth daily 6/15/22  Yes Nga Martines, DO   gabapentin (NEURONTIN) 400 MG capsule TAKE 1 CAPSULE BY MOUTH IN THE MORNING, ONE IN THE AFTERNOON, AND 2 AT BEDTIME  Patient taking differently: TAKE 2 CAPSULE BY MOUTH IN THE MORNING, ONE IN THE AFTERNOON, AND 2 AT BEDTIME 5/26/22  Yes Triston Martines DO   Acetone, Urine, Test (KETONE TEST) STRP As need for elevated glucose 10/13/21  Yes BEATRIS Medina CNP   Glucagon (GVOKE HYPOPEN 2-PACK) 1 MG/0.2ML SOAJ Inject 1 mg into the skin as needed (hypoglycemia) Inject 1mg under the skin as needed for low blood sugars. 7/7/21  Yes BEATRIS Medina CNP   Continuous Blood Gluc  (DEXCOM G6 ) JARED 1 Units by Does not apply route daily 3/3/21  Yes BEATRIS Medina CNP   EPINEPHrine (EPIPEN 2-KARY) 0.3 MG/0.3ML SOAJ injection Use as directed for allergic reaction  Patient not taking: Reported on 9/26/2022 6/15/22   Ne Saez DO       Past Surgical History:   Procedure Laterality Date    APPENDECTOMY      TYMPANOSTOMY TUBE PLACEMENT         Family History   Problem Relation Age of Onset    Diabetes Mother         type 1    Heart Disease Mother     Kidney Disease Mother     High Cholesterol Mother     High Blood Pressure Mother     High Blood Pressure Father     Diabetes Father         type 2    Heart Disease Father        Social History     Tobacco Use    Smoking status: Some Days     Packs/day: 0.25     Years: 16.00     Pack years: 4.00     Types: Cigarettes     Start date: 1/1/2005    Smokeless tobacco: Never    Tobacco comments:     ECLAMB RRT 7/19/17   Substance Use Topics    Alcohol use: Yes     Alcohol/week: 18.0 standard drinks     Types: 18 Cans of beer per week     Comment: has 18 beers in a weekend       ROS: All 14 ROS systems reviewed and pertinent positives noted above, all others negative. Lower Extremity Physical Examination:     Vitals:   Vitals:    09/26/22 1539   BP: 138/86   Pulse: 99   Resp: 16   SpO2: 99%     General: AAO x 3 in NAD.      Vascular: DP and PT pulses palpable 2/4, bilateral.  CFT <3 seconds, bilateral.  Hair growth present to the level of the digits, bilateral.  Edema dorsal medial midfoot to ankle area. Varicosities absent, bilateral. Erythema absent, bilateral. Distal Rubor absent bilateral.  Temperature within normal limits bilateral. Hyperpigmentation absent bilateral. No atrophic skin. Neurological: Sensation Impaired to light touch to level of digits, bilateral.  Protective sensation intact  5/10 sites via 5.07/10g Lena-Erika Monofilament, bilateral.  negative Tinel's, bilateral.  negative Valleix sign, bilateral.  Vibratory abnormal  bilateral.  Reflexes Decreased bilateral.  Paresthesias positive. Dysthesias negative. Sharp/dull abnormal  bilateral.   Musculoskeletal: Muscle strength 5/5, Bilateral.  Pain present upon palpation of  midfoot generalized. Isolated around TN joint. within normal limits medial longitudinal arch, Bilateral.  Ankle ROM within normal limits,Bilateral.  1st MPJ ROM within normal limits, Bilateral.  Dorsally contracted digits absent digits none, Bilateral. Other foot deformities none. Integument: Warm, dry, supple, bilateral.  Open lesion absent, bilateral.  Interdigital maceration absent to web spaces bilateral.  Nails within normal limits. Fissures absent, bilateral. Hyperkeratotic tissue is absent. CT: see report        Asessment: Patient is a 40 y.o. male with:    Diagnosis Orders   1. Closed displaced fracture of navicular bone of right foot, sequela        2. Type 1 diabetes mellitus with diabetic neuropathy (HCC)            Plan: Patient examined and evaluated. Current condition and treatment options discussed in detail. DM foot ed and exam  Patient will continue use of cam walker outside of work at all times weightbearing. Pt refuses to be off work completely. Continue offloading.    Orders Placed This Encounter   Medications    diclofenac (VOLTAREN) 50 MG EC tablet     Sig: Take 1 tablet by mouth 3 times daily (with meals)     Dispense:  60 tablet     Refill:  0   Patient will be referred to Dr. Nolvia Frias or Dr. Roldan Stands for possible repair or more aggressive  procedure due to the breakdown of the navicular bone. Possible old fracture versus history of AVN over time. X rays and CT reviewed with the pt in detail. All questions answered. Contact office with any questions/problems/concerns.   RTC in prn

## 2022-10-04 ENCOUNTER — OFFICE VISIT (OUTPATIENT)
Dept: ORTHOPEDIC SURGERY | Age: 37
End: 2022-10-04
Payer: MEDICARE

## 2022-10-04 VITALS — WEIGHT: 170 LBS | HEIGHT: 70 IN | BODY MASS INDEX: 24.34 KG/M2 | TEMPERATURE: 98.3 F

## 2022-10-04 DIAGNOSIS — M79.671 RIGHT FOOT PAIN: Primary | ICD-10-CM

## 2022-10-04 DIAGNOSIS — Z91.89 AT HIGH RISK FOR POSTOPERATIVE COMPLICATIONS: ICD-10-CM

## 2022-10-04 DIAGNOSIS — E11.610 CHARCOT FOOT DUE TO DIABETES MELLITUS (HCC): Primary | ICD-10-CM

## 2022-10-04 DIAGNOSIS — E10.42 DIABETIC POLYNEUROPATHY ASSOCIATED WITH TYPE 1 DIABETES MELLITUS (HCC): ICD-10-CM

## 2022-10-04 PROCEDURE — G8484 FLU IMMUNIZE NO ADMIN: HCPCS | Performed by: ORTHOPAEDIC SURGERY

## 2022-10-04 PROCEDURE — 99203 OFFICE O/P NEW LOW 30 MIN: CPT | Performed by: ORTHOPAEDIC SURGERY

## 2022-10-04 PROCEDURE — 4004F PT TOBACCO SCREEN RCVD TLK: CPT | Performed by: ORTHOPAEDIC SURGERY

## 2022-10-04 PROCEDURE — 2022F DILAT RTA XM EVC RTNOPTHY: CPT | Performed by: ORTHOPAEDIC SURGERY

## 2022-10-04 PROCEDURE — 3046F HEMOGLOBIN A1C LEVEL >9.0%: CPT | Performed by: ORTHOPAEDIC SURGERY

## 2022-10-04 PROCEDURE — G8420 CALC BMI NORM PARAMETERS: HCPCS | Performed by: ORTHOPAEDIC SURGERY

## 2022-10-04 PROCEDURE — G8427 DOCREV CUR MEDS BY ELIG CLIN: HCPCS | Performed by: ORTHOPAEDIC SURGERY

## 2022-10-04 NOTE — PROGRESS NOTES
Of the  [address] [place] [address] AND SPORTS MEDICINE  [facility]  [address] [place] [address]  Dept: [phone]    Ambulatory Orthopedic Consult      CHIEF COMPLAINT:    Chief Complaint   Patient presents with    Foot Pain     Right       HISTORY OF PRESENT ILLNESS:      The patient is a 40 y.o. male who is being seen for evaluation of the above, which began around 9/1/2022 atraumatically  . At today's visit, he is using a cam boot. History is obtained today from:   [x]  the patient     [x]  EMR     []  one family member/friend    []  multiple family members/friends    []  other:      The patient is referred here today by Dr. Jose Sarah. REVIEW OF SYSTEMS:  Musculoskeletal: See HPI for pertinent positives     Past Medical History:    He  has a past medical history of Asthma, Diabetes mellitus ([facility] Diabetic ketoacidosis associated with type 1 diabetes mellitus ([facility] (04/28/2017), Head injury, Hyperlipidemia, Hypertension, Male erectile dysfunction, Migraine, and Neuropathy in diabetes ([facility] Past Surgical History:    He  has a past surgical history that includes Appendectomy and Tympanostomy tube placement. Current Medications:     Current Outpatient Medications:     diclofenac (VOLTAREN) 50 MG EC tablet, Take 1 tablet by mouth 3 times daily (with meals), Disp: 60 tablet, Rfl: 0    Insulin Disposable Pump (OMNIPOD 5 G6 INTRO, GEN 5,) KIT, Inject 1 each as directed every 3 days; 200 units of humalog in each pod last 3 days, Disp: 1 kit, Rfl: 0    Continuous Blood Gluc Sensor (DEXCOM G6 SENSOR) MISC, USE 1 SENSOR EVERY 10 DAYS, Disp: 9 each, Rfl: 3    Continuous Blood Gluc Transmit (DEXCOM G6 TRANSMITTER) MISC, USE 1 TRANSMITTER EVERY 3 MONTHS, Disp: 1 each, Rfl: 3    simvastatin (ZOCOR) 20 MG tablet, Take 1 tablet by mouth every evening, Disp: 90 tablet, Rfl: 3    HUMALOG 100 UNIT/ML injection vial, Sliding scale or use with omnipod. Max daily dose 65 units, Disp: 100 mL, Rfl: 3    lisinopril (PRINIVIL;ZESTRIL) 5 MG tablet, Take 1 tablet by mouth daily, Disp: 90 tablet, Rfl: 3    EPINEPHrine (EPIPEN 2-KARY) 0.3 MG/0.3ML SOAJ injection, Use as directed for allergic reaction (Patient not taking: Reported on 9/26/2022), Disp: 1 each, Rfl: 1    gabapentin (NEURONTIN) 400 MG capsule, TAKE 1 CAPSULE BY MOUTH IN THE MORNING, ONE IN THE AFTERNOON, AND 2 AT BEDTIME (Patient taking differently: TAKE 2 CAPSULE BY MOUTH IN THE MORNING, ONE IN THE AFTERNOON, AND 2 AT BEDTIME), Disp: 120 capsule, Rfl: 2    Acetone, Urine, Test (KETONE TEST) STRP, As need for elevated glucose, Disp: 50 strip, Rfl: 3    Glucagon (GVOKE HYPOPEN 2-PACK) 1 MG/0.2ML SOAJ, Inject 1 mg into the skin as needed (hypoglycemia) Inject 1mg under the skin as needed for low blood sugars. , Disp: 2 Syringe, Rfl: 1    Continuous Blood Gluc  ([address]) JARED, 1 Units by Does not apply route daily, Disp: 1 Device, Rfl: 0     Allergies:    Bee venom, Vancomycin, and Lipitor [atorvastatin]    Family History:  family history includes Diabetes in his father and mother; Heart Disease in his father and mother; High Blood Pressure in his father and mother; High Cholesterol in his mother; Kidney Disease in his mother. Social History:   Social History     Occupational History    Not on file   Tobacco Use    Smoking status: Some Days     Packs/day: 0.25     Years: 16.00     Pack years: 4.00     Types: Cigarettes     Start date: 1/1/2005    Smokeless tobacco: Never    Tobacco comments:     ECLAMB RRT 7/19/17   Vaping Use    Vaping Use: Never used   Substance and Sexual Activity    Alcohol use:  Yes     Alcohol/week: 18.0 standard drinks     Types: 18 Cans of beer per week     Comment: has 18 beers in a weekend    Drug use: Never    Sexual activity: Yes     Works full-time at [employer], on his feet    OBJECTIVE:  Temp 98.3 °F (36.8 °C)   Ht 5' 10\" (1.778 m)   Wt 170 lb (77.1 kg)   BMI 24.39 kg/m²    Psych: awake, alert  Cardio:  well perfused extremities, no cyanosis  Resp:  normal respiratory effort  Musculoskeletal:    RLE:  Vascular: Limb well perfused, compartments soft/compressible. Skin: No erythema/ulcers. Intact. Neurovascular Status: Sensation diminished throughout  Tenderness to Palpation: Midfoot  -Mild swelling      LLE:  Vascular: Limb well perfused, compartments soft/compressible. Skin: No erythema/ulcers. Intact. Neurovascular Status: Sensation diminished throughout  Tenderness to Palpation:        RADIOLOGY:   10/4/2022 FINDINGS:  Three weightbearing views (AP, Mortise, and Lateral) of the right ankle and three weightbearing views (AP, Oblique, Lateral) of the right foot were obtained in the office today and reviewed, revealing  severe fragmentation of the navicular with proximal migration midfoot , along with collapse of the midfoot. Talar head is not well visualized, and may also be fragmented. IMPRESSION: Neuropathic arthropathy as above. Electronically signed by Louise Echavarria MD      Relevant previous imaging reviewed, both imaging and report(s) as below:    -Right foot CT scan from 9/23/2022:  1. Findings above most likely represent developing Charcot foot/neuropathic   changes of the midfoot as detailed above. 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 Findings appear to have significantly progressed even from the 09/02/2022   right foot plain radiographs. 2. Mild-to-moderate soft tissue edema about the ankle and foot. 3. Small tibiotalar joint effusion.     -Right foot x-rays from 9/2/2022:  Some interval healing of comminuted nondisplaced fracture of the dorsal   aspect navicular. ASSESSMENT AND PLAN:  Body mass index is 24.39 kg/m². He has right Charcot arthropathy with fragmentation of the navicular and questionably of the talus, along with midfoot collapse. Notably, he has a somewhat complex past medical history.   He has a history of peripheral artery disease, type 1 diabetes (with history of diabetic foot ulcer and right foot osteomyelitis), and tobacco use (reports he smokes about 1 pack of cigarettes per week). We had a discussion today about the likely diagnosis and its natural history, physical exam and imaging findings, as well as various treatment options in detail. Surgically, we discussed a possible Charcot foot reconstruction in the future, depending on his clinical course. We did discuss that he is at high risk for complications with the surgery. At today's visit, I did recommend conservative management. Orders/referrals were placed as below at today's visit. The patient was placed into a total contact cast.  He will remain nonweightbearing, and we discussed the importance of this. He was ordered crutches, walker, and a rolling knee scooter to help get around. I also ordered physical therapy for the patient to hep reinforce/teach the relevant weight bearing precautions, to help allow for safe transfers and early mobilization, as well as effectively utilize DME. The patient has been provided an informational handout on Charcot foot. We discussed the importance of glucose control and daily skin checks to decrease the risks of ulcers/amputations.     -The patient was provided a note for work, recommending seated work only for the next 12 weeks. All questions were answered and the above plan was agreed upon. The patient will return to clinic in 1 month with a right foot x-rays, simulated weightbearing out of plaster. At his next visit, I anticipate performing a skin check and then replacing his total contact cast for another 4 weeks (we discussed that I anticipate at least 12 weeks of casting with nonweightbearing).            At the patient's next visit, depending on how the patient is doing and/or new imaging/labs results, we may consider the following options:    []  Lace up ankle     []  CAM boot         [] removable wrist brace     []  PT:        []  Wean out immobilization         []  Adv activity      []  Footmind        []  Spenco       []  Custom Orthotic:               []  AZ brace                    []  Rocker Bottom      []  Night splint    []  Heel cups        []  Strap        []  Toe gizmos    []  Topl        []  NSAIDs         []  Rose        []  Ref:         []  Stress Xray    []  CT        []  MRI  []  Inj:          []  Consider OR      []  Pick OR date    No follow-ups on file. No orders of the defined types were placed in this encounter. No orders of the defined types were placed in this encounter. Tay Solis MD  Orthopedic Surgery        Please excuse any typos/errors, as this note was created with the assistance of voice recognition software. While intending to generate a document that actually reflects the content of the visit, the document can still have some errors including those of syntax and sound-a-like substitutions which may escape proof reading. In such instances, actual meaning can be extrapolated by context.

## 2022-10-04 NOTE — LETTER
[address] [place] [address] and Sports Medicine  [place] [address]  Phone: [phone]  Fax: [phone]    Lissy Lee MD        October 4, 2022     Patient: Brianna Condon   YOB: 1985   Date of Visit: 10/4/2022       To Whom It May Concern: It is my medical opinion that Brianna Condon may return to work on 10/5/22 with the following restrictions: seated work only x 12 weeks. If you have any questions or concerns, please don't hesitate to call.     Sincerely,        The office of Dr. Agata Watt MD.

## 2022-10-04 NOTE — LETTER
[address] [place] [address] and Sports Medicine  [address] [address]  Phone: [phone]  Fax: [phone]    Nereyda Ahuja MD    October 4, 2022     DO Keenan Hart Merle    Patient: Huma London   MR Number: [id number]   YOB: 1985   Date of Visit: 10/4/2022       Dear Gareth Martines: Thank you for referring Huma London to me for evaluation/treatment. Below are the relevant portions of my assessment and plan of care. He has right Charcot arthropathy with fragmentation of the navicular and questionably of the talus, along with midfoot collapse. Notably, he has a somewhat complex past medical history. He has a history of peripheral artery disease, type 1 diabetes (with history of diabetic foot ulcer and right foot osteomyelitis), and tobacco use (reports he smokes about 1 pack of cigarettes per week). We had a discussion today about the likely diagnosis and its natural history, physical exam and imaging findings, as well as various treatment options in detail. Surgically, we discussed a possible Charcot foot reconstruction in the future, depending on his clinical course. We did discuss that he is at high risk for complications with the surgery. At today's visit, I did recommend conservative management. Orders/referrals were placed as below at today's visit. The patient was placed into a total contact cast.  He will remain nonweightbearing, and we discussed the importance of this. He was ordered crutches, walker, and a rolling knee scooter to help get around. I also ordered physical therapy for the patient to hep reinforce/teach the relevant weight bearing precautions, to help allow for safe transfers and early mobilization, as well as effectively utilize DME. The patient has been provided an informational handout on Charcot foot.   We discussed the importance of glucose control and daily skin checks to decrease the risks of ulcers/amputations.     -The patient was provided a note for work, recommending seated work only for the next 12 weeks. All questions were answered and the above plan was agreed upon. The patient will return to clinic in 1 month with a right foot x-rays, simulated weightbearing out of plaster. At his next visit, I anticipate performing a skin check and then replacing his total contact cast for another 4 weeks (we discussed that I anticipate at least 12 weeks of casting with nonweightbearing). If you have questions, please do not hesitate to call me. I look forward to following Mercedes Jean along with you.     Sincerely,      Neville Cruz MD

## 2022-10-17 ENCOUNTER — TELEPHONE (OUTPATIENT)
Dept: ORTHOPEDIC SURGERY | Age: 37
End: 2022-10-17

## 2022-10-17 NOTE — TELEPHONE ENCOUNTER
Patient left a message on the answering machine with the fax number for his [address] [address],  [phone]. If you need to speak with the patient, his number is [phone].

## 2022-10-31 DIAGNOSIS — E11.610 CHARCOT FOOT DUE TO DIABETES MELLITUS (HCC): Primary | ICD-10-CM

## 2022-11-01 ENCOUNTER — OFFICE VISIT (OUTPATIENT)
Dept: ORTHOPEDIC SURGERY | Age: 37
End: 2022-11-01
Payer: COMMERCIAL

## 2022-11-01 VITALS — BODY MASS INDEX: 24.34 KG/M2 | RESPIRATION RATE: 16 BRPM | HEIGHT: 70 IN | OXYGEN SATURATION: 100 % | WEIGHT: 170 LBS

## 2022-11-01 DIAGNOSIS — Z91.199 NONCOMPLIANCE: ICD-10-CM

## 2022-11-01 DIAGNOSIS — E11.610 CHARCOT FOOT DUE TO DIABETES MELLITUS (HCC): Primary | ICD-10-CM

## 2022-11-01 DIAGNOSIS — E10.42 DIABETIC POLYNEUROPATHY ASSOCIATED WITH TYPE 1 DIABETES MELLITUS (HCC): ICD-10-CM

## 2022-11-01 PROCEDURE — 2022F DILAT RTA XM EVC RTNOPTHY: CPT | Performed by: ORTHOPAEDIC SURGERY

## 2022-11-01 PROCEDURE — G8484 FLU IMMUNIZE NO ADMIN: HCPCS | Performed by: ORTHOPAEDIC SURGERY

## 2022-11-01 PROCEDURE — 4004F PT TOBACCO SCREEN RCVD TLK: CPT | Performed by: ORTHOPAEDIC SURGERY

## 2022-11-01 PROCEDURE — G8427 DOCREV CUR MEDS BY ELIG CLIN: HCPCS | Performed by: ORTHOPAEDIC SURGERY

## 2022-11-01 PROCEDURE — 3046F HEMOGLOBIN A1C LEVEL >9.0%: CPT | Performed by: ORTHOPAEDIC SURGERY

## 2022-11-01 PROCEDURE — G8420 CALC BMI NORM PARAMETERS: HCPCS | Performed by: ORTHOPAEDIC SURGERY

## 2022-11-01 PROCEDURE — 99212 OFFICE O/P EST SF 10 MIN: CPT | Performed by: ORTHOPAEDIC SURGERY

## 2022-11-01 NOTE — PROGRESS NOTES
Of the  [address] [place] [address] AND SPORTS MEDICINE  [facility] Winifred Harada  [address] [facility] [address]  Dept: [phone]    Ambulatory Orthopedic Consult      CHIEF COMPLAINT:    Chief Complaint   Patient presents with    Foot Pain     Right       HISTORY OF PRESENT ILLNESS:      The patient is a 40 y.o. male who is being seen for evaluation of the above, which began around 9/1/2022 atraumatically  . At today's visit, he is using a cam boot. History is obtained today from:   [x]  the patient     [x]  EMR     []  one family member/friend    []  multiple family members/friends    []  other:      The patient is referred here today by Dr. Federico Lawson. INTERVAL HISTORY 11/1/2022:  He is seen again today in the office for follow up of a previous issue (as above). Since being seen last, the patient is doing about the same overall. At today's visit, he is using a splint/cast.     History is obtained today from:   [x]  the patient     []  EMR     []  one family member/friend    []  multiple family members/friends    []  other:      He does report that he has occasionally been putting weight on his foot. REVIEW OF SYSTEMS:  Musculoskeletal: See HPI for pertinent positives     Past Medical History:    He  has a past medical history of Asthma, Diabetes mellitus ([facility] [address] Diabetic ketoacidosis associated with type 1 diabetes mellitus ([facility] [address] (04/28/2017), Head injury, Hyperlipidemia, Hypertension, Male erectile dysfunction, Migraine, and Neuropathy in diabetes ([facility] [address] Past Surgical History:    He  has a past surgical history that includes Appendectomy and Tympanostomy tube placement.      Current Medications:     Current Outpatient Medications:     diclofenac (VOLTAREN) 50 MG EC tablet, Take 1 tablet by mouth 3 times daily (with meals), Disp: 60 tablet, Rfl: 0    Insulin Disposable Pump (OMNIPOD 5 G6 INTRO, GEN 5,) KIT, Inject 1 each as directed every 3 days; 200 units of humalog in each pod last 3 days, Disp: 1 kit, Rfl: 0    Continuous Blood Gluc Sensor (DEXCOM G6 SENSOR) MISC, USE 1 SENSOR EVERY 10 DAYS, Disp: 9 each, Rfl: 3    Continuous Blood Gluc Transmit (DEXCOM G6 TRANSMITTER) MISC, USE 1 TRANSMITTER EVERY 3 MONTHS, Disp: 1 each, Rfl: 3    simvastatin (ZOCOR) 20 MG tablet, Take 1 tablet by mouth every evening, Disp: 90 tablet, Rfl: 3    HUMALOG 100 UNIT/ML injection vial, Sliding scale or use with omnipod. Max daily dose 65 units, Disp: 100 mL, Rfl: 3    lisinopril (PRINIVIL;ZESTRIL) 5 MG tablet, Take 1 tablet by mouth daily, Disp: 90 tablet, Rfl: 3    EPINEPHrine (EPIPEN 2-KARY) 0.3 MG/0.3ML SOAJ injection, Use as directed for allergic reaction (Patient not taking: Reported on 9/26/2022), Disp: 1 each, Rfl: 1    gabapentin (NEURONTIN) 400 MG capsule, TAKE 1 CAPSULE BY MOUTH IN THE MORNING, ONE IN THE AFTERNOON, AND 2 AT BEDTIME (Patient taking differently: TAKE 2 CAPSULE BY MOUTH IN THE MORNING, ONE IN THE AFTERNOON, AND 2 AT BEDTIME), Disp: 120 capsule, Rfl: 2    Acetone, Urine, Test (KETONE TEST) STRP, As need for elevated glucose, Disp: 50 strip, Rfl: 3    Glucagon (GVOKE HYPOPEN 2-PACK) 1 MG/0.2ML SOAJ, Inject 1 mg into the skin as needed (hypoglycemia) Inject 1mg under the skin as needed for low blood sugars. , Disp: 2 Syringe, Rfl: 1    Continuous Blood Gluc  ([address]) JARED, 1 Units by Does not apply route daily, Disp: 1 Device, Rfl: 0     Allergies:    Bee venom, Vancomycin, and Lipitor [atorvastatin]    Family History:  family history includes Diabetes in his father and mother; Heart Disease in his father and mother; High Blood Pressure in his father and mother; High Cholesterol in his mother; Kidney Disease in his mother.     Social History:   Social History     Occupational History    Not on file   Tobacco Use    Smoking status: Some Days     Packs/day: 0.25     Years: 16.00     Pack years: 4.00     Types: Cigarettes     Start date: 1/1/2005    Smokeless tobacco: Never    Tobacco comments:     ECLAMB RRT 7/19/17   Vaping Use    Vaping Use: Never used   Substance and Sexual Activity    Alcohol use: Yes     Alcohol/week: 18.0 standard drinks     Types: 18 Cans of beer per week     Comment: has 18 beers in a weekend    Drug use: Never    Sexual activity: Yes     Works full-time at [employer], on his feet    OBJECTIVE:  Resp 16   Ht 5' 10\" (1.778 m)   Wt 170 lb (77.1 kg)   SpO2 100%   BMI 24.39 kg/m²    Psych: awake, alert  Cardio:  well perfused extremities, no cyanosis  Resp:  normal respiratory effort  Musculoskeletal:    RLE:  Vascular: Limb well perfused, compartments soft/compressible. Skin: No erythema/ulcers. Intact. Neurovascular Status: Sensation diminished throughout  Tenderness to Palpation: Midfoot  -Mild swelling      LLE:  Vascular: Limb well perfused, compartments soft/compressible. Skin: No erythema/ulcers. Intact. Neurovascular Status: Sensation diminished throughout  Tenderness to Palpation:        RADIOLOGY:   11/1/2022 FINDINGS:  Three weightbearing views (AP, Oblique, Lateral) of the right foot were obtained in the office today and reviewed, revealing severe fragmentation of the navicular with proximal migration midfoot, along with collapse of the midfoot. Talar head is not well visualized, and may also be fragmented. No significant interval change. IMPRESSION: Neuropathic arthropathy as above. Electronically signed by Sana Wheat MD        FINDINGS:  Three weightbearing views (AP, Mortise, and Lateral) of the right ankle and three weightbearing views (AP, Oblique, Lateral) of the right foot were obtained in the office today and reviewed, revealing  severe fragmentation of the navicular with proximal migration midfoot , along with collapse of the midfoot. Talar head is not well visualized, and may also be fragmented. IMPRESSION: Neuropathic arthropathy as above.      Electronically signed by Yael Montelongo MD      Relevant previous imaging reviewed, both imaging and report(s) as below:    -Right foot CT scan from 9/23/2022:  1. Findings above most likely represent developing Charcot foot/neuropathic   changes of the midfoot as detailed above. 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 Findings appear to have significantly progressed even from the 09/02/2022   right foot plain radiographs. 2. Mild-to-moderate soft tissue edema about the ankle and foot. 3. Small tibiotalar joint effusion.     -Right foot x-rays from 9/2/2022:  Some interval healing of comminuted nondisplaced fracture of the dorsal   aspect navicular. ASSESSMENT AND PLAN:  Body mass index is 24.39 kg/m². He has right Charcot arthropathy with fragmentation of the navicular and questionably of the talus, along with midfoot collapse. Total contact casting began on 10/4/2022. Notably, he has a somewhat complex past medical history. He has a history of peripheral artery disease, type 1 diabetes (with history of diabetic foot ulcer and right foot osteomyelitis), and tobacco use (reports he smokes about 1 pack of cigarettes per week). We had a discussion today about the likely diagnosis and its natural history, physical exam and imaging findings, as well as various treatment options in detail. Surgically, we have discussed a possible Charcot foot reconstruction in the future, depending on his clinical course. We did discuss that he is at high risk for complications with the surgery. At today's visit, I did recommend conservative management. Orders/referrals were placed as below at today's visit. The patient was replaced into a total contact cast.  He will remain nonweightbearing, and we have discussed the importance of this. The patient has been provided an informational handout on Charcot foot.   We discussed the importance of glucose control and daily skin checks to decrease the risks of ulcers/amputations.     -The patient was previously provided a note for work, recommending seated work only for the next 12 weeks. All questions were answered and the above plan was agreed upon. The patient will return to clinic in 1 month with a right foot x-rays, simulated weightbearing out of plaster. At his next visit, I anticipate performing a skin check and then replacing his total contact cast for another 4 weeks (we discussed that I anticipate at least 12 weeks of casting with nonweightbearing). At the patient's next visit, depending on how the patient is doing and/or new imaging/labs results, we may consider the following options:    []  Lace up ankle     []  CAM boot         []  removable wrist brace     []  PT:        []  Wean out immobilization         []  Adv activity      []  Footmind        []  Spenco       []  Custom Orthotic:               []  AZ brace                    []  Rocker Bottom      []  Night splint    []  Heel cups        []  Strap        []  Toe gizmos    []  Topl        []  NSAIDs         []  Rose        []  Ref:         []  Stress Xray    []  CT        []  MRI  []  Inj:          []  Consider OR      []  Pick OR date    No follow-ups on file. No orders of the defined types were placed in this encounter. No orders of the defined types were placed in this encounter. Christen Ybarra MD  Orthopedic Surgery        Please excuse any typos/errors, as this note was created with the assistance of voice recognition software. While intending to generate a document that actually reflects the content of the visit, the document can still have some errors including those of syntax and sound-a-like substitutions which may escape proof reading. In such instances, actual meaning can be extrapolated by context.

## 2022-11-23 DIAGNOSIS — E11.610 CHARCOT FOOT DUE TO DIABETES MELLITUS (HCC): Primary | ICD-10-CM

## 2022-11-29 ENCOUNTER — OFFICE VISIT (OUTPATIENT)
Dept: ORTHOPEDIC SURGERY | Age: 37
End: 2022-11-29
Payer: MEDICARE

## 2022-11-29 VITALS — BODY MASS INDEX: 24.34 KG/M2 | HEIGHT: 70 IN | RESPIRATION RATE: 16 BRPM | OXYGEN SATURATION: 100 % | WEIGHT: 170 LBS

## 2022-11-29 DIAGNOSIS — E10.42 DIABETIC POLYNEUROPATHY ASSOCIATED WITH TYPE 1 DIABETES MELLITUS (HCC): ICD-10-CM

## 2022-11-29 DIAGNOSIS — E11.610 CHARCOT FOOT DUE TO DIABETES MELLITUS (HCC): Primary | ICD-10-CM

## 2022-11-29 PROCEDURE — 4004F PT TOBACCO SCREEN RCVD TLK: CPT | Performed by: ORTHOPAEDIC SURGERY

## 2022-11-29 PROCEDURE — G8427 DOCREV CUR MEDS BY ELIG CLIN: HCPCS | Performed by: ORTHOPAEDIC SURGERY

## 2022-11-29 PROCEDURE — 3046F HEMOGLOBIN A1C LEVEL >9.0%: CPT | Performed by: ORTHOPAEDIC SURGERY

## 2022-11-29 PROCEDURE — G8420 CALC BMI NORM PARAMETERS: HCPCS | Performed by: ORTHOPAEDIC SURGERY

## 2022-11-29 PROCEDURE — G8484 FLU IMMUNIZE NO ADMIN: HCPCS | Performed by: ORTHOPAEDIC SURGERY

## 2022-11-29 PROCEDURE — 99212 OFFICE O/P EST SF 10 MIN: CPT | Performed by: ORTHOPAEDIC SURGERY

## 2022-11-29 PROCEDURE — 2022F DILAT RTA XM EVC RTNOPTHY: CPT | Performed by: ORTHOPAEDIC SURGERY

## 2022-11-29 NOTE — PROGRESS NOTES
Of the  [address] [place] [address] AND SPORTS [facility]  [address] [place], [place] [id number]  Dept: [phone]    Ambulatory Orthopedic Consult      CHIEF COMPLAINT:    Chief Complaint   Patient presents with    Foot Pain     Right       HISTORY OF PRESENT ILLNESS:      The patient is a 40 y.o. male who is being seen for evaluation of the above, which began around 9/1/2022 atraumatically  . At today's visit, he is using a cam boot. History is obtained today from:   [x]  the patient     [x]  EMR     []  one family member/friend    []  multiple family members/friends    []  other:      The patient is referred here today by Dr. Aminah Russell. INTERVAL HISTORY 11/1/2022:  He is seen again today in the office for follow up of a previous issue (as above). Since being seen last, the patient is doing about the same overall. At today's visit, he is using a splint/cast.     History is obtained today from:   [x]  the patient     []  EMR     []  one family member/friend    []  multiple family members/friends    []  other:      He does report that he has occasionally been putting weight on his foot. INTERVAL HISTORY 11/29/2022:  He is seen again today in the office for follow up of a previous issue (as above). Since being seen last, the patient is doing about the same overall. At today's visit, he is using a splint/cast.     History is obtained today from:   [x]  the patient     []  EMR     []  one family member/friend    []  multiple family members/friends    []  other:        REVIEW OF SYSTEMS:  Musculoskeletal: See HPI for pertinent positives     Past Medical History:    He  has a past medical history of Asthma, Diabetes mellitus ([address] Diabetic ketoacidosis associated with type 1 diabetes mellitus ([address] (04/28/2017), Head injury, Hyperlipidemia, Hypertension, Male erectile dysfunction, Migraine, and Neuropathy in diabetes ([address]      Past Surgical History:    He  has a past surgical history that includes Appendectomy and Tympanostomy tube placement. Current Medications:     Current Outpatient Medications:     diclofenac (VOLTAREN) 50 MG EC tablet, Take 1 tablet by mouth 3 times daily (with meals), Disp: 60 tablet, Rfl: 0    Insulin Disposable Pump (OMNIPOD 5 G6 INTRO, GEN 5,) KIT, Inject 1 each as directed every 3 days; 200 units of humalog in each pod last 3 days, Disp: 1 kit, Rfl: 0    Continuous Blood Gluc Sensor (DEXCOM G6 SENSOR) MISC, USE 1 SENSOR EVERY 10 DAYS, Disp: 9 each, Rfl: 3    Continuous Blood Gluc Transmit (DEXCOM G6 TRANSMITTER) MISC, USE 1 TRANSMITTER EVERY 3 MONTHS, Disp: 1 each, Rfl: 3    simvastatin (ZOCOR) 20 MG tablet, Take 1 tablet by mouth every evening, Disp: 90 tablet, Rfl: 3    HUMALOG 100 UNIT/ML injection vial, Sliding scale or use with omnipod. Max daily dose 65 units, Disp: 100 mL, Rfl: 3    lisinopril (PRINIVIL;ZESTRIL) 5 MG tablet, Take 1 tablet by mouth daily, Disp: 90 tablet, Rfl: 3    EPINEPHrine (EPIPEN 2-KARY) 0.3 MG/0.3ML SOAJ injection, Use as directed for allergic reaction (Patient not taking: Reported on 9/26/2022), Disp: 1 each, Rfl: 1    gabapentin (NEURONTIN) 400 MG capsule, TAKE 1 CAPSULE BY MOUTH IN THE MORNING, ONE IN THE AFTERNOON, AND 2 AT BEDTIME (Patient taking differently: TAKE 2 CAPSULE BY MOUTH IN THE MORNING, ONE IN THE AFTERNOON, AND 2 AT BEDTIME), Disp: 120 capsule, Rfl: 2    Acetone, Urine, Test (KETONE TEST) STRP, As need for elevated glucose, Disp: 50 strip, Rfl: 3    Glucagon (GVOKE HYPOPEN 2-PACK) 1 MG/0.2ML SOAJ, Inject 1 mg into the skin as needed (hypoglycemia) Inject 1mg under the skin as needed for low blood sugars. , Disp: 2 Syringe, Rfl: 1    Continuous Blood Gluc  ([address]) JARED, 1 Units by Does not apply route daily, Disp: 1 Device, Rfl: 0     Allergies:    Bee venom, Vancomycin, and Lipitor [atorvastatin]    Family History:  family history includes Diabetes in his father and mother; Heart Disease in his father and mother; High Blood Pressure in his father and mother; High Cholesterol in his mother; Kidney Disease in his mother. Social History:   Social History     Occupational History    Not on file   Tobacco Use    Smoking status: Some Days     Packs/day: 0.25     Years: 16.00     Pack years: 4.00     Types: Cigarettes     Start date: 1/1/2005    Smokeless tobacco: Never    Tobacco comments:     ECLAMB RRT 7/19/17   Vaping Use    Vaping Use: Never used   Substance and Sexual Activity    Alcohol use: Yes     Alcohol/week: 18.0 standard drinks     Types: 18 Cans of beer per week     Comment: has 18 beers in a weekend    Drug use: Never    Sexual activity: Yes     Works full-time at [employer], on his feet    OBJECTIVE:  Resp 16   Ht 5' 10\" (1.778 m)   Wt 170 lb (77.1 kg)   SpO2 100%   BMI 24.39 kg/m²    Psych: awake, alert  Cardio:  well perfused extremities, no cyanosis  Resp:  normal respiratory effort  Musculoskeletal:    RLE:  Vascular: Limb well perfused, compartments soft/compressible. Skin: No erythema/ulcers. Intact. Neurovascular Status: Sensation diminished throughout  Tenderness to Palpation: Midfoot  -Mild swelling      LLE:  Vascular: Limb well perfused, compartments soft/compressible. Skin: No erythema/ulcers. Intact. Neurovascular Status: Sensation diminished throughout  Tenderness to Palpation:        RADIOLOGY:   11/29/2022 FINDINGS:  Three weightbearing views (AP, Oblique, Lateral) of the right foot were obtained in the office today and reviewed, revealing severe fragmentation of the navicular with proximal migration midfoot, along with collapse of the midfoot. The talar head is not well visualized, and may also be fragmented. No significant interval change. IMPRESSION: Neuropathic arthropathy as above.      Electronically signed by Alysha Genao MD        FINDINGS:  Three weightbearing views (AP, Mortise, and Lateral) of the right ankle and three weightbearing views (AP, Oblique, Lateral) of the right foot were obtained in the office today and reviewed, revealing  severe fragmentation of the navicular with proximal migration midfoot , along with collapse of the midfoot. Talar head is not well visualized, and may also be fragmented. IMPRESSION: Neuropathic arthropathy as above. Electronically signed by Sammie Andrew MD      Relevant previous imaging reviewed, both imaging and report(s) as below:    -Right foot CT scan from 9/23/2022:  1. Findings above most likely represent developing Charcot foot/neuropathic   changes of the midfoot as detailed above. 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 Findings appear to have significantly progressed even from the 09/02/2022   right foot plain radiographs. 2. Mild-to-moderate soft tissue edema about the ankle and foot. 3. Small tibiotalar joint effusion.     -Right foot x-rays from 9/2/2022:  Some interval healing of comminuted nondisplaced fracture of the dorsal   aspect navicular. ASSESSMENT AND PLAN:  Body mass index is 24.39 kg/m². He has right Charcot arthropathy with fragmentation of the navicular and questionably of the talus, along with midfoot collapse. Total contact casting began on 10/4/2022; he is doing well without evidence of complication at this time. Notably, he has a somewhat complex past medical history. He has a history of peripheral artery disease, type 1 diabetes (with history of diabetic foot ulcer and right foot osteomyelitis), and tobacco use (reports he smokes about 1 pack of cigarettes per week). We had a discussion today about the likely diagnosis and its natural history, physical exam and imaging findings, as well as various treatment options in detail. Surgically, we have previously discussed a possible Charcot foot reconstruction in the future, depending on his clinical course.   We did discuss that he is at high risk for complications with the surgery. At today's visit, I did recommend continuing conservative management. Orders/referrals were placed as below at today's visit. The patient was replaced into a total contact cast.  He will remain nonweightbearing, and we have discussed the importance of this. The patient has been provided an informational handout on Charcot foot. We discussed the importance of glucose control and daily skin checks to decrease the risks of ulcers/amputations.     -The patient was previously provided a note for work, recommending seated work only for the next 12 weeks. All questions were answered and the above plan was agreed upon. The patient will return to clinic in 1 month with right foot x-rays, semi-weightbearing out of plaster. At his next visit, I anticipate performing a skin check and then likely transitioning him to a cam boot and ordering a Crow walker if he has no signs of displacement. At the patient's next visit, depending on how the patient is doing and/or new imaging/labs results, we may consider the following options:    []  Lace up ankle     []  CAM boot         []  removable wrist brace     []  PT:        []  Wean out immobilization         []  Adv activity      []  Footmind        []  Spenco       []  Custom Orthotic:               []  AZ brace                    []  Rocker Bottom      []  Night splint    []  Heel cups        []  Strap        []  Toe gizmos    []  Topl        []  NSAIDs         []  Rose        []  Ref:         []  Stress Xray    []  CT        []  MRI  []  Inj:          []  Consider OR      []  Pick OR date    No follow-ups on file. No orders of the defined types were placed in this encounter. No orders of the defined types were placed in this encounter. Weston Dancer, MD  Orthopedic Surgery        Please excuse any typos/errors, as this note was created with the assistance of voice recognition software.  While intending to generate a document that actually reflects the content of the visit, the document can still have some errors including those of syntax and sound-a-like substitutions which may escape proof reading. In such instances, actual meaning can be extrapolated by context.

## 2022-12-23 DIAGNOSIS — E11.610 CHARCOT FOOT DUE TO DIABETES MELLITUS (HCC): Primary | ICD-10-CM

## 2022-12-26 ENCOUNTER — PATIENT MESSAGE (OUTPATIENT)
Dept: DIABETES SERVICES | Age: 37
End: 2022-12-26

## 2022-12-26 DIAGNOSIS — E10.42 TYPE 1 DIABETES MELLITUS WITH DIABETIC POLYNEUROPATHY (HCC): ICD-10-CM

## 2022-12-27 ENCOUNTER — OFFICE VISIT (OUTPATIENT)
Dept: ORTHOPEDIC SURGERY | Age: 37
End: 2022-12-27
Payer: MEDICARE

## 2022-12-27 VITALS — OXYGEN SATURATION: 100 % | HEIGHT: 70 IN | RESPIRATION RATE: 16 BRPM | BODY MASS INDEX: 24.34 KG/M2 | WEIGHT: 170 LBS

## 2022-12-27 DIAGNOSIS — E11.610 CHARCOT FOOT DUE TO DIABETES MELLITUS (HCC): Primary | ICD-10-CM

## 2022-12-27 DIAGNOSIS — E10.42 DIABETIC POLYNEUROPATHY ASSOCIATED WITH TYPE 1 DIABETES MELLITUS (HCC): ICD-10-CM

## 2022-12-27 PROCEDURE — G8420 CALC BMI NORM PARAMETERS: HCPCS | Performed by: ORTHOPAEDIC SURGERY

## 2022-12-27 PROCEDURE — 3046F HEMOGLOBIN A1C LEVEL >9.0%: CPT | Performed by: ORTHOPAEDIC SURGERY

## 2022-12-27 PROCEDURE — G8484 FLU IMMUNIZE NO ADMIN: HCPCS | Performed by: ORTHOPAEDIC SURGERY

## 2022-12-27 PROCEDURE — 2022F DILAT RTA XM EVC RTNOPTHY: CPT | Performed by: ORTHOPAEDIC SURGERY

## 2022-12-27 PROCEDURE — G8427 DOCREV CUR MEDS BY ELIG CLIN: HCPCS | Performed by: ORTHOPAEDIC SURGERY

## 2022-12-27 PROCEDURE — 4004F PT TOBACCO SCREEN RCVD TLK: CPT | Performed by: ORTHOPAEDIC SURGERY

## 2022-12-27 PROCEDURE — 99212 OFFICE O/P EST SF 10 MIN: CPT | Performed by: ORTHOPAEDIC SURGERY

## 2022-12-27 RX ORDER — INSULIN PMP CART,AUT,G6/7,CNTR
EACH SUBCUTANEOUS
Qty: 10 EACH | Refills: 3 | Status: SHIPPED | OUTPATIENT
Start: 2022-12-27

## 2022-12-27 RX ORDER — INSULIN PMP CART,AUT,G6/7,CNTR
EACH SUBCUTANEOUS
OUTPATIENT
Start: 2022-12-27

## 2022-12-27 NOTE — TELEPHONE ENCOUNTER
From: Todd Land  To: Jett Schmidt  Sent: 12/26/2022 2:55 PM EST  Subject: Omni pod    I need a refill for my Omni pod 5 but just the Omni pods not the starter kit please and thank you

## 2022-12-27 NOTE — LETTER
[address] [place] [address] and Sports [address] [address]  Phone: [phone]  Fax: [phone]    Lindon Baumgarten, MD        December 27, 2022     Patient: Tiesha Spencer   YOB: 1985   Date of Visit: 12/27/2022       To Whom It May Concern: It is my medical opinion that Tiesha Spencer continue seated work only for 4 weeks. Once he receives his CROW walker boot restrictions will change. If you have any questions or concerns, please don't hesitate to call.     Sincerely,    The office of Dr. Katt Moody MD

## 2022-12-27 NOTE — PROGRESS NOTES
Of the  [address] [place] [address] AND SPORTS MEDICINE  [facility]   [address] [facility] [address]  Dept: [phone]    Ambulatory Orthopedic Consult      CHIEF COMPLAINT:    Chief Complaint   Patient presents with    Foot Pain     Right       HISTORY OF PRESENT ILLNESS:      The patient is a 40 y.o. male who is being seen for evaluation of the above, which began around 9/1/2022 atraumatically  . At today's visit, he is using a cam boot. History is obtained today from:   [x]  the patient     [x]  EMR     []  one family member/friend    []  multiple family members/friends    []  other:      The patient is referred here today by Dr. Petr Epps. INTERVAL HISTORY 11/1/2022:  He is seen again today in the office for follow up of a previous issue (as above). Since being seen last, the patient is doing about the same overall. At today's visit, he is using a splint/cast.     History is obtained today from:   [x]  the patient     []  EMR     []  one family member/friend    []  multiple family members/friends    []  other:      He does report that he has occasionally been putting weight on his foot. INTERVAL HISTORY 11/29/2022:  He is seen again today in the office for follow up of a previous issue (as above). Since being seen last, the patient is doing about the same overall. At today's visit, he is using a splint/cast.     History is obtained today from:   [x]  the patient     []  EMR     []  one family member/friend    []  multiple family members/friends    []  other:      INTERVAL HISTORY 12/27/2022:  He is seen again today in the office for follow up of a previous issue (as above). Since being seen last, the patient is doing about the same overall.  At today's visit, he is using a splint/cast.     History is obtained today from:   [x]  the patient     []  EMR     []  one family member/friend    []  multiple family members/friends    []  other: REVIEW OF SYSTEMS:  Musculoskeletal: See HPI for pertinent positives     Past Medical History:    He  has a past medical history of Asthma, Diabetes mellitus ([facility] [address] Diabetic ketoacidosis associated with type 1 diabetes mellitus ([facility] [address] (04/28/2017), Head injury, Hyperlipidemia, Hypertension, Male erectile dysfunction, Migraine, and Neuropathy in diabetes ([facility] [address] Past Surgical History:    He  has a past surgical history that includes Appendectomy and Tympanostomy tube placement. Current Medications:     Current Outpatient Medications:     diclofenac (VOLTAREN) 50 MG EC tablet, Take 1 tablet by mouth 3 times daily (with meals), Disp: 60 tablet, Rfl: 0    Insulin Disposable Pump (OMNIPOD 5 G6 INTRO, GEN 5,) KIT, Inject 1 each as directed every 3 days; 200 units of humalog in each pod last 3 days, Disp: 1 kit, Rfl: 0    Continuous Blood Gluc Sensor (DEXCOM G6 SENSOR) MISC, USE 1 SENSOR EVERY 10 DAYS, Disp: 9 each, Rfl: 3    Continuous Blood Gluc Transmit (DEXCOM G6 TRANSMITTER) MISC, USE 1 TRANSMITTER EVERY 3 MONTHS, Disp: 1 each, Rfl: 3    simvastatin (ZOCOR) 20 MG tablet, Take 1 tablet by mouth every evening, Disp: 90 tablet, Rfl: 3    HUMALOG 100 UNIT/ML injection vial, Sliding scale or use with omnipod.  Max daily dose 65 units, Disp: 100 mL, Rfl: 3    lisinopril (PRINIVIL;ZESTRIL) 5 MG tablet, Take 1 tablet by mouth daily, Disp: 90 tablet, Rfl: 3    EPINEPHrine (EPIPEN 2-KARY) 0.3 MG/0.3ML SOAJ injection, Use as directed for allergic reaction (Patient not taking: Reported on 9/26/2022), Disp: 1 each, Rfl: 1    gabapentin (NEURONTIN) 400 MG capsule, TAKE 1 CAPSULE BY MOUTH IN THE MORNING, ONE IN THE AFTERNOON, AND 2 AT BEDTIME (Patient taking differently: TAKE 2 CAPSULE BY MOUTH IN THE MORNING, ONE IN THE AFTERNOON, AND 2 AT BEDTIME), Disp: 120 capsule, Rfl: 2    Acetone, Urine, Test (KETONE TEST) STRP, As need for elevated glucose, Disp: 50 strip, Rfl: 3    Glucagon (GVOKE HYPOPEN 2-PACK) 1 MG/0.2ML SOAJ, Inject 1 mg into the skin as needed (hypoglycemia) Inject 1mg under the skin as needed for low blood sugars. , Disp: 2 Syringe, Rfl: 1    Continuous Blood Gluc  ([address]) JARED, 1 Units by Does not apply route daily, Disp: 1 Device, Rfl: 0     Allergies:    Bee venom, Vancomycin, and Lipitor [atorvastatin]    Family History:  family history includes Diabetes in his father and mother; Heart Disease in his father and mother; High Blood Pressure in his father and mother; High Cholesterol in his mother; Kidney Disease in his mother. Social History:   Social History     Occupational History    Not on file   Tobacco Use    Smoking status: Some Days     Packs/day: 0.25     Years: 16.00     Pack years: 4.00     Types: Cigarettes     Start date: 1/1/2005    Smokeless tobacco: Never    Tobacco comments:     ECLAMB RRT 7/19/17   Vaping Use    Vaping Use: Never used   Substance and Sexual Activity    Alcohol use: Yes     Alcohol/week: 18.0 standard drinks     Types: 18 Cans of beer per week     Comment: has 18 beers in a weekend    Drug use: Never    Sexual activity: Yes     Works full-time at [employer], on his feet    OBJECTIVE:  Resp 16   Ht 5' 10\" (1.778 m)   Wt 170 lb (77.1 kg)   SpO2 100%   BMI 24.39 kg/m²    Psych: awake, alert  Cardio:  well perfused extremities, no cyanosis  Resp:  normal respiratory effort  Musculoskeletal:    RLE:  Vascular: Limb well perfused, compartments soft/compressible. Skin: No erythema/ulcers. Intact. Neurovascular Status: Sensation diminished throughout  Tenderness to Palpation: Midfoot  -Mild swelling      LLE:  Vascular: Limb well perfused, compartments soft/compressible. Skin: No erythema/ulcers. Intact.    Neurovascular Status: Sensation diminished throughout  Tenderness to Palpation:        RADIOLOGY:   12/27/2022 FINDINGS:  Three weightbearing views (AP, Oblique, Lateral) of the right foot were obtained in the office today and reviewed, revealing severe fragmentation of the navicular with proximal migration midfoot, along with collapse of the midfoot. The talar head is not well visualized, and may also be fragmented. No significant interval change. IMPRESSION: Neuropathic arthropathy as above. Electronically signed by Eliana Curry MD        FINDINGS:  Three weightbearing views (AP, Mortise, and Lateral) of the right ankle and three weightbearing views (AP, Oblique, Lateral) of the right foot were obtained in the office today and reviewed, revealing  severe fragmentation of the navicular with proximal migration midfoot , along with collapse of the midfoot. Talar head is not well visualized, and may also be fragmented. IMPRESSION: Neuropathic arthropathy as above. Electronically signed by Eliana Curry MD      Relevant previous imaging reviewed, both imaging and report(s) as below:    -Right foot CT scan from 9/23/2022:  1. Findings above most likely represent developing Charcot foot/neuropathic   changes of the midfoot as detailed above. 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 Findings appear to have significantly progressed even from the 09/02/2022   right foot plain radiographs. 2. Mild-to-moderate soft tissue edema about the ankle and foot. 3. Small tibiotalar joint effusion.     -Right foot x-rays from 9/2/2022:  Some interval healing of comminuted nondisplaced fracture of the dorsal   aspect navicular. ASSESSMENT AND PLAN:  Body mass index is 24.39 kg/m². He has right Charcot arthropathy with fragmentation of the navicular and questionably of the talus, along with midfoot collapse. Total contact casting began on 10/4/2022; he is doing well without evidence of complication at this time. Notably, he has a somewhat complex past medical history.   He has a history of peripheral artery disease, type 1 diabetes (with history of diabetic foot ulcer and right foot osteomyelitis), and tobacco use (reports he smokes about 1 pack of cigarettes per week). We had a discussion today about the likely diagnosis and its natural history, physical exam and imaging findings, as well as various treatment options in detail. Surgically, we have previously discussed a possible Charcot foot reconstruction in the future, depending on his clinical course. We did discuss that he is at high risk for complications with the surgery. At today's visit, I did recommend continuing conservative management. Orders/referrals were placed as below at today's visit. His total contact cast was removed. He was placed into a cam boot, and we discussed not doing much until he gets his Crow walker boot. He was referred to prosthetics and orthotics today to obtain a Crow walker boot. We have again discussed the risks of displacement. The patient has been provided an informational handout on Charcot foot. We discussed the importance of glucose control and daily skin checks to decrease the risks of ulcers/amputations.     -The patient was previously provided a note for work, recommending seated work only for the next 4 weeks. All questions were answered and the above plan was agreed upon. The patient will return to clinic in 3 months with repeat right foot x-rays. At his next visit, we will monitor for signs of displacement.            At the patient's next visit, depending on how the patient is doing and/or new imaging/labs results, we may consider the following options:    []  Lace up ankle     []  CAM boot         []  removable wrist brace     []  PT:        []  Wean out immobilization         []  Adv activity      []  Footmind        []  Spenco       []  Custom Orthotic:               []  AZ brace                    []  Rocker Bottom      []  Night splint    []  Heel cups        []  Strap        []  Toe gizmos    []  Topl        []  NSAIDs         []  Rose        []  Ref:         []  Stress Xray    []  CT        []  MRI  []  Inj:          [] Consider OR      []  Pick OR date    No follow-ups on file. No orders of the defined types were placed in this encounter. No orders of the defined types were placed in this encounter. James Smith MD  Orthopedic Surgery        Please excuse any typos/errors, as this note was created with the assistance of voice recognition software. While intending to generate a document that actually reflects the content of the visit, the document can still have some errors including those of syntax and sound-a-like substitutions which may escape proof reading. In such instances, actual meaning can be extrapolated by context.

## 2023-02-08 ENCOUNTER — TELEPHONE (OUTPATIENT)
Dept: ORTHOPEDIC SURGERY | Age: 38
End: 2023-02-08

## 2023-02-08 NOTE — TELEPHONE ENCOUNTER
Patient called wanting to know if we can update his work note. It was only valid for 4 weeks, and he is still waiting for them to call him to let him know his CROW boot is ready. He said he was told it may be ready sometime this week, but has not received a call. Are you OK with extending the work note out? Please advise. If OK, he would like his note faxed to [phone]. I told him someone would call and inform him of this.

## 2023-02-21 ENCOUNTER — TELEPHONE (OUTPATIENT)
Dept: ORTHOPEDIC SURGERY | Age: 38
End: 2023-02-21

## 2023-02-21 NOTE — TELEPHONE ENCOUNTER
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 Call back [phone].

## 2023-02-22 ENCOUNTER — TELEPHONE (OUTPATIENT)
Dept: INTERNAL MEDICINE | Age: 38
End: 2023-02-22

## 2023-02-22 ENCOUNTER — OFFICE VISIT (OUTPATIENT)
Dept: DIABETES SERVICES | Age: 38
End: 2023-02-22
Payer: MEDICARE

## 2023-02-22 VITALS
SYSTOLIC BLOOD PRESSURE: 126 MMHG | RESPIRATION RATE: 16 BRPM | WEIGHT: 180 LBS | DIASTOLIC BLOOD PRESSURE: 88 MMHG | HEIGHT: 70 IN | BODY MASS INDEX: 25.77 KG/M2 | HEART RATE: 88 BPM

## 2023-02-22 DIAGNOSIS — E78.2 MIXED HYPERLIPIDEMIA: ICD-10-CM

## 2023-02-22 DIAGNOSIS — I10 ESSENTIAL HYPERTENSION: ICD-10-CM

## 2023-02-22 DIAGNOSIS — E10.42 TYPE 1 DIABETES MELLITUS WITH DIABETIC POLYNEUROPATHY (HCC): Primary | ICD-10-CM

## 2023-02-22 PROCEDURE — 99214 OFFICE O/P EST MOD 30 MIN: CPT | Performed by: NURSE PRACTITIONER

## 2023-02-22 RX ORDER — BLOOD-GLUCOSE TRANSMITTER
EACH MISCELLANEOUS
Qty: 1 EACH | Refills: 3 | Status: SHIPPED | OUTPATIENT
Start: 2023-02-22

## 2023-02-22 RX ORDER — INSULIN PMP CART,AUT,G6/7,CNTR
EACH SUBCUTANEOUS
Qty: 10 EACH | Refills: 5 | Status: SHIPPED | OUTPATIENT
Start: 2023-02-22

## 2023-02-22 ASSESSMENT — ENCOUNTER SYMPTOMS
ABDOMINAL PAIN: 0
SHORTNESS OF BREATH: 0
DIARRHEA: 0
RESPIRATORY NEGATIVE: 1

## 2023-02-22 NOTE — TELEPHONE ENCOUNTER
Sent Carmelina, omnipod rep, secure email on patient requesting the following: Beti Breen : [birth year] # [phone] (M) He is needing set up where his omnipod is linked with our practice. our proconnect code is: [place] \"      His response:    \"I will send Jeral Curb directions on linking glooko. \"

## 2023-02-22 NOTE — PROGRESS NOTES
[facility] A DEPARTMENT OF [facility]  [address] [facility] [address] [phone]        HISTORY:    Pedro Sotelo presents today for evaluation and management of:  Chief Complaint   Patient presents with    Diabetes       Patient Care Team:  Nga Martines DO as PCP - General (Family Medicine)  Nga Martines DO [facility] P[facility] - [facility]  Bear Jamison MD as Consulting Physician (Endocrinology)      Interval History:    Past DM Medications   none     Current Diabetic Medications  novolog for use with omnipod.      Tresiba 20 units daily with novolog vial and syringe for meal bolus.      DKA episodes: Multiple episodes of DKA related to not taking insulin and on diagnosis.    08/17/22   Pedro Sotelo is a 37 y.o. male patient who presents to clinic today for follow up on his diabetes. he has a history of PAD, HTN, neuropathy and hyperlipidemia which contributes to his diabetes. At previous visit omnipod was updated to gen 5. He has upgraded about 2 weeks ago but has not set up [employer] account. He tired to call customer support but did not get a response. he denies any signs or symptoms of hyper/hypoglycemia. he states they are taking their medications as prescribed without any adverse effects.   Diet: Counting carbs  Exercise: None  BS testing: uses cgm daily with success and is adherent to cgm therapy  Issues: supply of insulin  Diabetic foot exam up-to-date: no  Diabetic retinal exam up-to-date: yes  Hypoglycemia as needed treatment: snacks,    02/22/23   Pedro Sotelo is a 37 y.o. male patient who presents to clinic today for his diabetes. he has a history of PAD, HTN, neuropathy and hyperlipidemia which contributes to his diabetes. At previous visit diabetes counseling was provided. he denies any current signs or symptoms of hyper/hypoglycemia. he states they are taking their medications as prescribed without any  adverse effects. Diet: counting carbs  Exercise: none due to fractured ankle, but will return to work next week. BS testing: uses cgm daily with success and is adherent to cgm therapy  Hypoglycemia: No  Hypoglycemia as needed treatment: snack  Issues:   Diabetic foot exam up-to-date: Yes  Diabetic retinal exam up-to-date: Yes    Diabetes complications:peripheral neuropathy      High cholesterol-  Takes Zocor and denies any adverse effects with its use. Watches diet and exercise. Hypertension-  Takes lisinopril and denies any adverse effects with their use. Watches diet and exercise. Denies any chest pain, dizziness or edema. Past Medical History:   Diagnosis Date    Asthma     Diabetes mellitus ([facility] [address]     type 1    Diabetic ketoacidosis associated with type 1 diabetes mellitus ([facility] 04/28/2017    Head injury     Hyperlipidemia     Hypertension     Male erectile dysfunction     Migraine     Neuropathy in diabetes ([facility]      Family History   Problem Relation Age of Onset    Diabetes Mother         type 1    Heart Disease Mother     Kidney Disease Mother     High Cholesterol Mother     High Blood Pressure Mother     High Blood Pressure Father     Diabetes Father         type 2    Heart Disease Father      Social History     Tobacco Use    Smoking status: Some Days     Packs/day: 0.25     Years: 16.00     Pack years: 4.00     Types: Cigarettes     Start date: 1/1/2005    Smokeless tobacco: Never    Tobacco comments:     ECLAMB RRT 7/19/17   Vaping Use    Vaping Use: Never used   Substance Use Topics    Alcohol use: Yes     Alcohol/week: 18.0 standard drinks     Types: 18 Cans of beer per week     Comment: has 18 beers in a weekend    Drug use: Never     Allergies   Allergen Reactions    Bee Venom Hives     With swelling    Vancomycin Other (See Comments)     From [facility] care plan - reaction unknown.      Lipitor [Atorvastatin] Hives       MEDICATIONS:  Current Outpatient Medications   Medication Sig Dispense Refill    Continuous Blood Gluc Transmit (DEXCOM G6 TRANSMITTER) MISC USE 1 TRANSMITTER EVERY 3 MONTHS 1 each 3    Insulin Disposable Pump (OMNIPOD 5 G6 POD, GEN 5,) MISC Use daily to deliver insulin. Change pod every 3 days. 10 each 5    HUMALOG 100 UNIT/ML injection vial Sliding scale or use with omnipod. Max daily dose 65 units 100 mL 3    Continuous Blood Gluc Transmit (DEXCOM G6 TRANSMITTER) MISC Use one transmitter every 3 months 1 each 3    diclofenac (VOLTAREN) 50 MG EC tablet Take 1 tablet by mouth 3 times daily (with meals) 60 tablet 0    simvastatin (ZOCOR) 20 MG tablet Take 1 tablet by mouth every evening 90 tablet 3    lisinopril (PRINIVIL;ZESTRIL) 5 MG tablet Take 1 tablet by mouth daily 90 tablet 3    gabapentin (NEURONTIN) 400 MG capsule TAKE 1 CAPSULE BY MOUTH IN THE MORNING, ONE IN THE AFTERNOON, AND 2 AT BEDTIME (Patient taking differently: TAKE 2 CAPSULE BY MOUTH IN THE MORNING, ONE IN THE AFTERNOON, AND 2 AT BEDTIME) 120 capsule 2    Continuous Blood Gluc Sensor (DEXCOM G6 SENSOR) MISC USE 1 SENSOR EVERY 10 DAYS 9 each 3    EPINEPHrine (EPIPEN 2-KARY) 0.3 MG/0.3ML SOAJ injection Use as directed for allergic reaction (Patient not taking: Reported on 9/26/2022) 1 each 1    Acetone, Urine, Test (KETONE TEST) STRP As need for elevated glucose 50 strip 3    Glucagon (GVOKE HYPOPEN 2-PACK) 1 MG/0.2ML SOAJ Inject 1 mg into the skin as needed (hypoglycemia) Inject 1mg under the skin as needed for low blood sugars. 2 Syringe 1    Continuous Blood Gluc  (DEXCOM G6 ) JARED 1 Units by Does not apply route daily 1 Device 0     No current facility-administered medications for this visit. Review ofSymptoms:  Review of Systems   Constitutional:  Positive for fatigue. Negative for unexpected weight change. Eyes:  Negative for visual disturbance. Respiratory: Negative. Negative for shortness of breath. Cardiovascular:  Negative for chest pain and leg swelling.    Gastrointestinal: Negative for abdominal pain and diarrhea. Endocrine: Negative for polydipsia, polyphagia and polyuria. Genitourinary: Negative. Musculoskeletal: Negative. Skin:  Negative for rash and wound. Neurological:  Negative for dizziness, tremors, seizures and headaches. Psychiatric/Behavioral: Negative. Negative for confusion and decreased concentration. Theremainder of a complete 14-point review of systems is negative. Vital Signs: /88 (Site: Left Upper Arm, Position: Sitting, Cuff Size: Medium Adult)   Pulse 88   Resp 16   Ht 5' 10\" (1.778 m)   Wt 180 lb (81.6 kg)   BMI 25.83 kg/m²      Wt Readings from Last 3 Encounters:   02/22/23 180 lb (81.6 kg)   12/27/22 170 lb (77.1 kg)   11/29/22 170 lb (77.1 kg)     Body mass index is 25.83 kg/m².   LABS:  Hemoglobin A1C   Date Value Ref Range Status   06/15/2022 12.7 (H) 4.0 - 6.0 % Final   06/14/2021 7.2 (H) 4.0 - 6.0 % Final     Lab Results   Component Value Date    LABMICR 397 (H) 06/15/2022     Lab Results   Component Value Date     06/15/2022    K 4.2 06/15/2022     06/15/2022    CO2 24 06/15/2022    BUN 14 06/15/2022    CREATININE 0.68 (L) 06/15/2022    GLUCOSE 300 (H) 06/15/2022    CALCIUM 9.5 06/15/2022    PROT 6.9 08/08/2020    LABALBU 3.8 01/20/2021    BILITOT 0.3 01/20/2021    ALKPHOS 92 01/20/2021    AST 15 01/20/2021    ALT 12 01/20/2021    LABGLOM >60 06/15/2022    GFRAA >60 06/15/2022     Lab Results   Component Value Date    CHOL 184 08/08/2020    CHOL 235 (H) 12/14/2018    CHOL 282 (H) 02/10/2017     Lab Results   Component Value Date    TRIG 71 08/08/2020    TRIG 159 (H) 12/14/2018    TRIG 361 (H) 02/10/2017     Lab Results   Component Value Date    HDL 60 08/08/2020    HDL 71 12/14/2018    HDL 65 02/10/2017     Lab Results   Component Value Date    LDLCHOLESTEROL 110 08/08/2020    LDLCHOLESTEROL 132 (H) 12/14/2018    LDLCHOLESTEROL 145 (H) 02/10/2017     Lab Results   Component Value Date    VLDL NOT REPORTED 08/08/2020    VLDL NOT REPORTED 12/14/2018    VLDL 72 (H) 02/10/2017     Lab Results   Component Value Date    RAFFAELE 3.1 08/08/2020    GINOO 3.3 12/14/2018    GINOO 4.3 02/10/2017           Physical Exam  Constitutional:       Appearance: Normal appearance. He is well-developed. HENT:      Head: Normocephalic. Eyes:      Pupils: Pupils are equal, round, and reactive to light. Cardiovascular:      Rate and Rhythm: Normal rate and regular rhythm. Pulmonary:      Effort: Pulmonary effort is normal.      Breath sounds: Normal breath sounds. Musculoskeletal:         General: Normal range of motion. Cervical back: Normal range of motion. Skin:     General: Skin is warm and dry. Findings: No lesion (no lipohypertrophy) or rash. Neurological:      Mental Status: He is alert and oriented to person, place, and time. Mental status is at baseline. Sensory: No sensory deficit. Psychiatric:         Mood and Affect: Mood normal.         Speech: Speech normal.         Behavior: Behavior normal.         Thought Content: Thought content normal.         Judgment: Judgment normal.           ASSESSMENT/PLAN:     Diagnosis Orders   1. Type 1 diabetes mellitus with diabetic polyneuropathy (HCC)  Continuous Blood Gluc Transmit (DEXCOM G6 TRANSMITTER) MISC    Insulin Disposable Pump (OMNIPOD 5 G6 POD, GEN 5,) MISC    HUMALOG 100 UNIT/ML injection vial    Continuous Blood Gluc Transmit (DEXCOM G6 TRANSMITTER) MISC      2. Mixed hyperlipidemia        3. Essential hypertension          No orders of the defined types were placed in this encounter. Orders Placed This Encounter   Medications    Continuous Blood Gluc Transmit (DEXCOM G6 TRANSMITTER) MISC     Sig: USE 1 TRANSMITTER EVERY 3 MONTHS     Dispense:  1 each     Refill:  3    Insulin Disposable Pump (OMNIPOD 5 G6 POD, GEN 5,) MISC     Sig: Use daily to deliver insulin. Change pod every 3 days.      Dispense:  10 each     Refill:  5 HUMALOG 100 UNIT/ML injection vial     Sig: Sliding scale or use with omnipod. Max daily dose 65 units     Dispense:  100 mL     Refill:  3    Continuous Blood Gluc Transmit (DEXCOM G6 TRANSMITTER) MISC     Sig: Use one transmitter every 3 months     Dispense:  1 each     Refill:  3     Requested Prescriptions     Signed Prescriptions Disp Refills    Continuous Blood Gluc Transmit (DEXCOM G6 TRANSMITTER) MISC 1 each 3     Sig: USE 1 TRANSMITTER EVERY 3 MONTHS    Insulin Disposable Pump (OMNIPOD 5 G6 POD, GEN 5,) MISC 10 each 5     Sig: Use daily to deliver insulin. Change pod every 3 days. HUMALOG 100 UNIT/ML injection vial 100 mL 3     Sig: Sliding scale or use with omnipod. Max daily dose 65 units    Continuous Blood Gluc Transmit (DEXCOM G6 TRANSMITTER) MISC 1 each 3     Sig: Use one transmitter every 3 months       1. Type 1 diabetes mellitus with diabetic polyneuropathy (HCC)  - Unstable  HbA1C goal is less than 7%. - Fasting blood glucose goal is 70-120mg/dl and postprandial blood sugar goal is less than 180 mg/dl. - Labs reviewed including most recent A1c, UACR and kidney function. Repeat labs due in 3 months.    -We discussed in great detail dietary modifications they can make to better improve their blood sugars. -follow up diabetes education completed, all questions answered. CGM report downloaded and reviewed from the past 2 weeks scanned to media tab. Time in range 20%, 80% hyperglycemia, and hypoglycemia 0%. Predicted A1c per CGM report 9.3% and average glucose 250 mg/dl.   -unable to download omnipod report  Patient Instructions   Labs due  Target glucose changed  Stay in automated mode       Discussed signs and symptoms of hyper/hypoglycemia and how to treat. Encouraged 150 minutes of physical activity per week. Follow a low carbohydrate diet. Encouraged at least 7 hours of sleep. The patient was informed of the goals of diabetes management.  This can only be accomplished by watching their diet and exercise levels. We certainly use medicines to help attain these goals. The consequences of not controlling blood sugars were discussed. These include blindness, heart disease, stroke, kidney disease, and possibly need for dialysis. They were told to be careful with their foot care as diabetics often have nerve damage, infections and risk for limb amutations . They also need a dilated eye exam yearly. We discussed the issues of diet, exercise, medication, complication avoidance, reviewed the signs and symptoms of diabetes, hypoglycemic episodes, significance of HbA1C.     - Continuous Blood Gluc Transmit (DEXCOM G6 TRANSMITTER) MISC; USE 1 TRANSMITTER EVERY 3 MONTHS  Dispense: 1 each; Refill: 3  - Insulin Disposable Pump (OMNIPOD 5 G6 POD, GEN 5,) MISC; Use daily to deliver insulin. Change pod every 3 days. Dispense: 10 each; Refill: 5  - HUMALOG 100 UNIT/ML injection vial; Sliding scale or use with omnipod. Max daily dose 65 units  Dispense: 100 mL; Refill: 3  - Continuous Blood Gluc Transmit (DEXCOM G6 TRANSMITTER) MISC; Use one transmitter every 3 months  Dispense: 1 each; Refill: 3    2. Mixed hyperlipidemia  stable, lipid panel reviewed, continue current medications. Diet and exercise. 3. Essential hypertension   stable, continue current medications. Diet and exercise Seek emergent care if chest pain develops. Answered all patient questions. Agrees to follow plan of care and to follow up in 3 months, sooner if needed. Call office if unexplained blood sugars less than 70 occur or above 400. Call office or access MyChart with any further questions or concerns. Be sure to bring glucometer/food log at next appointment. Total time spent reviewing chart, labs, counseling patient and documenting on the date of the encounter: 30 min.       Electronically signed by BEATRIS Woods CNP on 2/22/2023 at 9:45 AM      (Please note that portions of this note were completed with a voice-recognition program. Efforts were made to edit the dictation but occasionally words are mis-transcribed.)

## 2023-03-24 DIAGNOSIS — E11.610 CHARCOT FOOT DUE TO DIABETES MELLITUS (HCC): Primary | ICD-10-CM

## 2023-03-28 ENCOUNTER — OFFICE VISIT (OUTPATIENT)
Dept: ORTHOPEDIC SURGERY | Age: 38
End: 2023-03-28
Payer: MEDICARE

## 2023-03-28 VITALS — OXYGEN SATURATION: 100 % | BODY MASS INDEX: 25.77 KG/M2 | HEIGHT: 70 IN | RESPIRATION RATE: 16 BRPM | WEIGHT: 180 LBS

## 2023-03-28 DIAGNOSIS — E10.42 DIABETIC POLYNEUROPATHY ASSOCIATED WITH TYPE 1 DIABETES MELLITUS (HCC): ICD-10-CM

## 2023-03-28 DIAGNOSIS — E11.610 CHARCOT FOOT DUE TO DIABETES MELLITUS (HCC): Primary | ICD-10-CM

## 2023-03-28 PROCEDURE — 3046F HEMOGLOBIN A1C LEVEL >9.0%: CPT | Performed by: ORTHOPAEDIC SURGERY

## 2023-03-28 PROCEDURE — G8484 FLU IMMUNIZE NO ADMIN: HCPCS | Performed by: ORTHOPAEDIC SURGERY

## 2023-03-28 PROCEDURE — 99212 OFFICE O/P EST SF 10 MIN: CPT | Performed by: ORTHOPAEDIC SURGERY

## 2023-03-28 PROCEDURE — 2022F DILAT RTA XM EVC RTNOPTHY: CPT | Performed by: ORTHOPAEDIC SURGERY

## 2023-03-28 PROCEDURE — G8419 CALC BMI OUT NRM PARAM NOF/U: HCPCS | Performed by: ORTHOPAEDIC SURGERY

## 2023-03-28 PROCEDURE — 4004F PT TOBACCO SCREEN RCVD TLK: CPT | Performed by: ORTHOPAEDIC SURGERY

## 2023-03-28 PROCEDURE — G8427 DOCREV CUR MEDS BY ELIG CLIN: HCPCS | Performed by: ORTHOPAEDIC SURGERY

## 2023-03-28 NOTE — PROGRESS NOTES
Of the  [address] [place] [address] AND SPORTS MEDICINE  Critical access hospital [facility]  [address] [address]  Dept: [phone]    Ambulatory Orthopedic Consult      CHIEF COMPLAINT:    Chief Complaint   Patient presents with    Foot Pain     Right       HISTORY OF PRESENT ILLNESS:      The patient is a 45 y.o. male who is being seen for evaluation of the above, which began around 9/1/2022 atraumatically  . At today's visit, he is using a cam boot. History is obtained today from:   [x]  the patient     [x]  EMR     []  one family member/friend    []  multiple family members/friends    []  other:      The patient is referred here today by Dr. Karie Martinez. INTERVAL HISTORY 11/1/2022:  He is seen again today in the office for follow up of a previous issue (as above). Since being seen last, the patient is doing about the same overall. At today's visit, he is using a splint/cast.     History is obtained today from:   [x]  the patient     []  EMR     []  one family member/friend    []  multiple family members/friends    []  other:      He does report that he has occasionally been putting weight on his foot. INTERVAL HISTORY 11/29/2022:  He is seen again today in the office for follow up of a previous issue (as above). Since being seen last, the patient is doing about the same overall. At today's visit, he is using a splint/cast.     History is obtained today from:   [x]  the patient     []  EMR     []  one family member/friend    []  multiple family members/friends    []  other:      INTERVAL HISTORY 12/27/2022:  He is seen again today in the office for follow up of a previous issue (as above). Since being seen last, the patient is doing about the same overall.  At today's visit, he is using a splint/cast.     History is obtained today from:   [x]  the patient     []  EMR     []  one family member/friend    []  multiple family members/friends    []  other:

## 2023-07-05 ENCOUNTER — OFFICE VISIT (OUTPATIENT)
Dept: DIABETES SERVICES | Age: 38
End: 2023-07-05

## 2023-07-05 ENCOUNTER — HOSPITAL ENCOUNTER (OUTPATIENT)
Age: 38
Discharge: HOME | End: 2023-07-05
Payer: MEDICARE

## 2023-07-05 VITALS
RESPIRATION RATE: 15 BRPM | HEIGHT: 70 IN | WEIGHT: 196.2 LBS | DIASTOLIC BLOOD PRESSURE: 100 MMHG | SYSTOLIC BLOOD PRESSURE: 142 MMHG | HEART RATE: 60 BPM | BODY MASS INDEX: 28.09 KG/M2

## 2023-07-05 DIAGNOSIS — E78.2 MIXED HYPERLIPIDEMIA: ICD-10-CM

## 2023-07-05 DIAGNOSIS — E10.42 TYPE 1 DIABETES MELLITUS WITH DIABETIC POLYNEUROPATHY (HCC): ICD-10-CM

## 2023-07-05 DIAGNOSIS — E10.42 TYPE 1 DIABETES MELLITUS WITH DIABETIC POLYNEUROPATHY (HCC): Primary | ICD-10-CM

## 2023-07-05 DIAGNOSIS — I10 ESSENTIAL HYPERTENSION: ICD-10-CM

## 2023-07-05 LAB
ANION GAP SERPL CALCULATED.3IONS-SCNC: 12 MMOL/L (ref 9–17)
BUN SERPL-MCNC: 20 MG/DL (ref 6–20)
BUN/CREAT SERPL: 23 (ref 9–20)
CALCIUM SERPL-MCNC: 9.2 MG/DL (ref 8.6–10.4)
CHLORIDE SERPL-SCNC: 101 MMOL/L (ref 98–107)
CO2 SERPL-SCNC: 26 MMOL/L (ref 20–31)
CREAT SERPL-MCNC: 0.87 MG/DL (ref 0.7–1.2)
CREAT UR-MCNC: 156.2 MG/DL (ref 39–259)
GFR SERPL CREATININE-BSD FRML MDRD: >60 ML/MIN/1.73M2
GLUCOSE SERPL-MCNC: 121 MG/DL (ref 70–99)
MICROALBUMIN UR-MCNC: 3064 MG/L
MICROALBUMIN/CREAT UR-RTO: 1962 MCG/MG CREAT
POTASSIUM SERPL-SCNC: 3.7 MMOL/L (ref 3.7–5.3)
SODIUM SERPL-SCNC: 139 MMOL/L (ref 135–144)

## 2023-07-05 PROCEDURE — 36415 COLL VENOUS BLD VENIPUNCTURE: CPT

## 2023-07-05 PROCEDURE — 82043 UR ALBUMIN QUANTITATIVE: CPT

## 2023-07-05 PROCEDURE — 83036 HEMOGLOBIN GLYCOSYLATED A1C: CPT

## 2023-07-05 PROCEDURE — 82570 ASSAY OF URINE CREATININE: CPT

## 2023-07-05 PROCEDURE — 80048 BASIC METABOLIC PNL TOTAL CA: CPT

## 2023-07-05 NOTE — PROGRESS NOTES
polyneuropathy ([address])  - Unstable  HbA1C goal is less than 7%. - Fasting blood glucose goal is 70-120mg/dl and postprandial blood sugar goal is less than 180 mg/dl. - Labs reviewed including most recent A1c, UACR and kidney function. Repeat labs due in 1 week. -We discussed in great detail dietary modifications they can make to better improve their blood sugars. -follow up diabetes education completed, all questions answered. CGM report downloaded and reviewed from the past 2 weeks scanned to media tab. Time in range 27%, 73% hyperglycemia, and hypoglycemia 0%. Predicted A1c per CGM report 9.2% and average glucose 245 mg/dl.   -labs due now, discussed the importance of using bolus feature. Discussed non compliance. No pump changes today.   -unable to download pump report. -we will reach out to [facility] to get is pump connected for downloads. Discussed signs and symptoms of hyper/hypoglycemia and how to treat. Encouraged 150 minutes of physical activity per week. Follow a low carbohydrate diet. Encouraged at least 7 hours of sleep. The patient was informed of the goals of diabetes management. This can only be accomplished by watching their diet and exercise levels. We certainly use medicines to help attain these goals. The consequences of not controlling blood sugars were discussed. These include blindness, heart disease, stroke, kidney disease, and possibly need for dialysis. They were told to be careful with their foot care as diabetics often have nerve damage, infections and risk for limb amutations . They also need a dilated eye exam yearly. We discussed the issues of diet, exercise, medication, complication avoidance, reviewed the signs and symptoms of diabetes, hypoglycemic episodes, significance of HbA1C.       2. Mixed hyperlipidemia  stable, lipid panel reviewed, continue current medications. Diet and exercise. 3. Essential hypertension   abnormal, continue current medications.  Diet and

## 2023-07-06 LAB
EST. AVERAGE GLUCOSE BLD GHB EST-MCNC: 226 MG/DL
HBA1C MFR BLD: 9.5 % (ref 4–6)

## 2023-09-07 DIAGNOSIS — E10.42 TYPE 1 DIABETES MELLITUS WITH DIABETIC POLYNEUROPATHY (HCC): ICD-10-CM

## 2023-09-07 RX ORDER — PROCHLORPERAZINE 25 MG/1
SUPPOSITORY RECTAL
Qty: 3 EACH | Refills: 5 | Status: SHIPPED | OUTPATIENT
Start: 2023-09-07

## 2023-10-24 DIAGNOSIS — E10.42 TYPE 1 DIABETES MELLITUS WITH DIABETIC POLYNEUROPATHY (HCC): ICD-10-CM

## 2023-10-25 RX ORDER — INSULIN PMP CART,AUT,G6/7,CNTR
EACH SUBCUTANEOUS
Qty: 10 EACH | Refills: 5 | Status: SHIPPED | OUTPATIENT
Start: 2023-10-25

## 2024-01-11 ENCOUNTER — OFFICE VISIT (OUTPATIENT)
Dept: DIABETES SERVICES | Age: 39
End: 2024-01-11

## 2024-01-11 VITALS
BODY MASS INDEX: 29.65 KG/M2 | WEIGHT: 211.8 LBS | HEIGHT: 71 IN | HEART RATE: 87 BPM | DIASTOLIC BLOOD PRESSURE: 76 MMHG | SYSTOLIC BLOOD PRESSURE: 138 MMHG | OXYGEN SATURATION: 99 %

## 2024-01-11 DIAGNOSIS — I10 ESSENTIAL HYPERTENSION: ICD-10-CM

## 2024-01-11 DIAGNOSIS — E10.42 TYPE 1 DIABETES MELLITUS WITH DIABETIC POLYNEUROPATHY (HCC): Primary | ICD-10-CM

## 2024-01-11 DIAGNOSIS — E78.2 MIXED HYPERLIPIDEMIA: ICD-10-CM

## 2024-01-11 RX ORDER — INSULIN PMP CART,AUT,G6/7,CNTR
EACH SUBCUTANEOUS
Qty: 30 EACH | Refills: 5 | Status: SHIPPED | OUTPATIENT
Start: 2024-01-11

## 2024-01-11 ASSESSMENT — ENCOUNTER SYMPTOMS
ABDOMINAL PAIN: 0
SHORTNESS OF BREATH: 0
RESPIRATORY NEGATIVE: 1
DIARRHEA: 0

## 2024-01-11 NOTE — PROGRESS NOTES
careful with their foot care as diabetics often have nerve damage, infections and risk for limb amutations . They also need a dilated eye exam yearly. We discussed the issues of diet, exercise, medication, complication avoidance, reviewed the signs and symptoms of diabetes, hypoglycemic episodes, significance of HbA1C.     - Hemoglobin A1C; Future  - Comprehensive Metabolic Panel; Future  - Lipid Panel; Future  - Microalbumin, Ur; Future  - insulin detemir (LEVEMIR FLEXTOUCH) 100 UNIT/ML injection pen; Inject 20 Units into the skin nightly For use when not using insulin pump  Dispense: 5 Adjustable Dose Pre-filled Pen Syringe; Refill: 3  - Insulin Pen Needle 32G X 4 MM MISC; Use one needle with each injection  Dispense: 100 each; Refill: 3  - Insulin Disposable Pump (OMNIPOD 5 G6 POD, GEN 5,) MISC; use as directed CHANGE every 3 days  Dispense: 30 each; Refill: 5    2. Mixed hyperlipidemia  stable, lipid panel reviewed, continue current medications. Diet and exercise.       3. Essential hypertension   stable, continue current medications. Diet and exercise Seek emergent care if chest pain develops.               Answered all patient questions. Agrees to follow plan of care and to follow up in 1 months, sooner if needed. Call office if unexplained blood sugars less than 70 occur or above 400. Call office or access MyChart with any further questions or concerns. Be sure to bring glucometer/food log at next appointment.       Total time spent reviewing chart, labs, counseling patient and documenting on the date of the encounter: 30 min.      Electronically signed by BEATRIS Barroso CNP on 1/11/2024 at 2:35 PM      (Please note that portions of this note were completed with a voice-recognition program. Efforts were made to edit the dictation but occasionally words are mis-transcribed.)

## 2024-01-18 ENCOUNTER — TELEPHONE (OUTPATIENT)
Dept: INTERNAL MEDICINE | Age: 39
End: 2024-01-18

## 2024-01-18 NOTE — TELEPHONE ENCOUNTER
Pedro called stating that his insurance is not paying for his Omnipod again, and we will need to complete the paperwork.   Please advise.       Medical Necessity Clause: This procedure was medically necessary because the lesions that were treated were:

## 2024-01-22 ENCOUNTER — TELEPHONE (OUTPATIENT)
Dept: INTERNAL MEDICINE | Age: 39
End: 2024-01-22

## 2024-02-12 ENCOUNTER — OFFICE VISIT (OUTPATIENT)
Dept: DIABETES SERVICES | Age: 39
End: 2024-02-12
Payer: MEDICARE

## 2024-02-12 VITALS
WEIGHT: 215.8 LBS | HEIGHT: 71 IN | HEART RATE: 94 BPM | DIASTOLIC BLOOD PRESSURE: 82 MMHG | BODY MASS INDEX: 30.21 KG/M2 | SYSTOLIC BLOOD PRESSURE: 126 MMHG | OXYGEN SATURATION: 98 %

## 2024-02-12 DIAGNOSIS — E78.2 MIXED HYPERLIPIDEMIA: ICD-10-CM

## 2024-02-12 DIAGNOSIS — E10.42 TYPE 1 DIABETES MELLITUS WITH DIABETIC POLYNEUROPATHY (HCC): Primary | ICD-10-CM

## 2024-02-12 DIAGNOSIS — I10 ESSENTIAL HYPERTENSION: ICD-10-CM

## 2024-02-12 PROCEDURE — 4004F PT TOBACCO SCREEN RCVD TLK: CPT | Performed by: NURSE PRACTITIONER

## 2024-02-12 PROCEDURE — 3046F HEMOGLOBIN A1C LEVEL >9.0%: CPT | Performed by: NURSE PRACTITIONER

## 2024-02-12 PROCEDURE — 99214 OFFICE O/P EST MOD 30 MIN: CPT | Performed by: NURSE PRACTITIONER

## 2024-02-12 PROCEDURE — 95251 CONT GLUC MNTR ANALYSIS I&R: CPT | Performed by: NURSE PRACTITIONER

## 2024-02-12 PROCEDURE — 3074F SYST BP LT 130 MM HG: CPT | Performed by: NURSE PRACTITIONER

## 2024-02-12 PROCEDURE — G8427 DOCREV CUR MEDS BY ELIG CLIN: HCPCS | Performed by: NURSE PRACTITIONER

## 2024-02-12 PROCEDURE — G8484 FLU IMMUNIZE NO ADMIN: HCPCS | Performed by: NURSE PRACTITIONER

## 2024-02-12 PROCEDURE — 2022F DILAT RTA XM EVC RTNOPTHY: CPT | Performed by: NURSE PRACTITIONER

## 2024-02-12 PROCEDURE — 3079F DIAST BP 80-89 MM HG: CPT | Performed by: NURSE PRACTITIONER

## 2024-02-12 PROCEDURE — 99212 OFFICE O/P EST SF 10 MIN: CPT

## 2024-02-12 PROCEDURE — G8417 CALC BMI ABV UP PARAM F/U: HCPCS | Performed by: NURSE PRACTITIONER

## 2024-02-12 NOTE — PROGRESS NOTES
[facility]  [address] [facility] [address] [phone]        HISTORY:    Pedro Sotelo presents today for evaluation and management of:  Chief Complaint   Patient presents with    Diabetes     1 month follow up       Patient Care Team:  Nga Martines DO as PCP - General (Family Medicine)  Nga Martines DO as PCP - [facility]  Bear Jamison MD as Consulting Physician (Endocrinology)      Interval History:    Past DM Medications   none     Current Diabetic Medications  novolog for use with omnipod.      Tresiba 20 units daily with novolog vial and syringe for meal bolus. (back up)      DKA episodes: Multiple episodes of DKA related to not taking insulin and on diagnosis.    01/11/24   Pedro Sotelo is a 38 y.o. male patient who presents to clinic today for his diabetes. he has a history of  PAD, HTN, neuropathy and hyperlipidemia    which contributes to his diabetes. At previous visit diabetes counseling was provided. he denies any current signs or symptoms of hyper/hypoglycemia. he states they are taking their medications as prescribed without any adverse effects. States he has been without omnipod due to insurance issues. Only taking meal time insulin.   Diet: regular  Exercise: none  BS testing: uses cgm daily with success and is adherent to cgm therapy  Hypoglycemia: No  Hypoglycemia as needed treatment: snack  Issues:   Diabetic foot exam up-to-date: Yes  Diabetic retinal exam up-to-date: No     Diabetes complications:peripheral neuropathy    02/14/24   Pedro Sotelo is a 38 y.o. male patient who presents to clinic today for his diabetes. he has a history of  PAD, HTN, neuropathy and hyperlipidemia   which contributes to his diabetes. At previous visit diabetes counseling was provided. he denies any current signs or symptoms of hyper/hypoglycemia. he states they are taking

## 2024-03-18 DIAGNOSIS — E10.42 TYPE 1 DIABETES MELLITUS WITH DIABETIC POLYNEUROPATHY (HCC): ICD-10-CM

## 2024-03-18 RX ORDER — PROCHLORPERAZINE 25 MG/1
SUPPOSITORY RECTAL
Qty: 1 EACH | Refills: 5 | Status: SHIPPED | OUTPATIENT
Start: 2024-03-18

## 2024-03-19 DIAGNOSIS — E10.42 TYPE 1 DIABETES MELLITUS WITH DIABETIC POLYNEUROPATHY (HCC): ICD-10-CM

## 2024-03-19 RX ORDER — INSULIN LISPRO 100 [IU]/ML
INJECTION, SOLUTION INTRAVENOUS; SUBCUTANEOUS
Qty: 100 ML | Refills: 5 | Status: SHIPPED | OUTPATIENT
Start: 2024-03-19

## 2024-03-20 DIAGNOSIS — E10.42 TYPE 1 DIABETES MELLITUS WITH DIABETIC POLYNEUROPATHY (HCC): ICD-10-CM

## 2024-03-20 RX ORDER — PROCHLORPERAZINE 25 MG/1
SUPPOSITORY RECTAL
Qty: 3 EACH | Refills: 5 | Status: SHIPPED | OUTPATIENT
Start: 2024-03-20

## 2024-03-20 RX ORDER — INSULIN ASPART 100 [IU]/ML
INJECTION, SOLUTION INTRAVENOUS; SUBCUTANEOUS
Qty: 20 ML | Refills: 3 | Status: SHIPPED | OUTPATIENT
Start: 2024-03-20

## 2024-03-20 NOTE — TELEPHONE ENCOUNTER
Also received a notice that patients humalog is no longer on the formulary list. Can we please change?      Please send update to [facility].

## 2024-07-08 ENCOUNTER — TELEPHONE (OUTPATIENT)
Dept: DIABETES SERVICES | Age: 39
End: 2024-07-08

## 2024-07-08 NOTE — TELEPHONE ENCOUNTER
Ginny with prior auth for [facility] calling to say Dexcom G6 approved from [id number]

## 2024-07-09 ENCOUNTER — TELEPHONE (OUTPATIENT)
Dept: INTERNAL MEDICINE | Age: 39
End: 2024-07-09

## 2024-07-16 ENCOUNTER — TELEPHONE (OUTPATIENT)
Dept: INTERNAL MEDICINE | Age: 39
End: 2024-07-16

## 2024-07-16 NOTE — TELEPHONE ENCOUNTER
Writer received notice from [facility] stating that Novolog was not a formulary preference.  Can we change script to formulary preference FIASP for use in omnipod insulin pump. Notice scanned to this encounter.  Please advise.

## 2024-07-22 DIAGNOSIS — E10.42 TYPE 1 DIABETES MELLITUS WITH DIABETIC POLYNEUROPATHY (HCC): ICD-10-CM

## 2024-07-23 DIAGNOSIS — E10.42 TYPE 1 DIABETES MELLITUS WITH DIABETIC POLYNEUROPATHY (HCC): ICD-10-CM

## 2024-07-23 RX ORDER — PROCHLORPERAZINE 25 MG/1
SUPPOSITORY RECTAL
Qty: 1 EACH | Refills: 5 | Status: SHIPPED | OUTPATIENT
Start: 2024-07-23

## 2024-07-23 RX ORDER — PROCHLORPERAZINE 25 MG/1
SUPPOSITORY RECTAL
Qty: 3 EACH | Refills: 1 | Status: SHIPPED | OUTPATIENT
Start: 2024-07-23

## 2024-07-23 NOTE — TELEPHONE ENCOUNTER
Patient had a prescription for a transmitter and is requesting a refill on his sensors. Please send to [facility] [place].

## 2024-07-23 NOTE — TELEPHONE ENCOUNTER
Pharmacy  requesting a refill of the below medication which has been pended for you:     Requested Prescriptions     Pending Prescriptions Disp Refills    Continuous Glucose Transmitter (DEXCOM G6 TRANSMITTER) MISC [Pharmacy Med Name: DEXCOM G6 TRANSMITTER] 1 each 5     Sig: USE 1 TRANSMITTER EVERY 3 MONTHS       Last Appointment Date: 2/12/2024  Next Appointment Date: 9/3/2024    Allergies   Allergen Reactions    Bee Venom Hives     With swelling    Vancomycin Other (See Comments)     From  care plan - reaction unknown.     Lipitor [Atorvastatin] Hives

## 2024-07-30 ENCOUNTER — TELEPHONE (OUTPATIENT)
Dept: INTERNAL MEDICINE | Age: 39
End: 2024-07-30

## 2024-07-30 DIAGNOSIS — E10.42 TYPE 1 DIABETES MELLITUS WITH DIABETIC POLYNEUROPATHY (HCC): ICD-10-CM

## 2024-07-30 NOTE — TELEPHONE ENCOUNTER
Writer received a fax from [facility] stating that Fiasp was not covered, requesting a change. Writer called [facility] for advisement on what the insurance wanted as they sent a formulary change requesting FIASP.  Pharmacist stated that the patient will need a script for Humalog. Patient uses the insulin in an Omni pod pump.  [employer] advise.

## 2024-08-01 RX ORDER — INSULIN LISPRO 100 [IU]/ML
INJECTION, SOLUTION INTRAVENOUS; SUBCUTANEOUS
Qty: 100 ML | Refills: 5 | Status: SHIPPED | OUTPATIENT
Start: 2024-08-01

## 2024-08-23 ENCOUNTER — TELEPHONE (OUTPATIENT)
Dept: INTERNAL MEDICINE | Age: 39
End: 2024-08-23

## 2024-08-23 NOTE — TELEPHONE ENCOUNTER
Patient called in stating that his insurance will not cover humalog and needs something different sent in for him. Patient reports that they did state to him that they would cover the generic. Please advise he is aware crystal is out until Monday

## 2024-08-26 RX ORDER — INSULIN LISPRO 100 [IU]/ML
INJECTION, SOLUTION INTRAVENOUS; SUBCUTANEOUS
Qty: 20 ML | Refills: 5 | Status: SHIPPED | OUTPATIENT
Start: 2024-08-26

## 2024-09-12 ENCOUNTER — OFFICE VISIT (OUTPATIENT)
Dept: DIABETES SERVICES | Age: 39
End: 2024-09-12
Payer: MEDICARE

## 2024-09-12 VITALS
WEIGHT: 199 LBS | DIASTOLIC BLOOD PRESSURE: 102 MMHG | BODY MASS INDEX: 28.49 KG/M2 | HEART RATE: 87 BPM | SYSTOLIC BLOOD PRESSURE: 166 MMHG | HEIGHT: 70 IN | OXYGEN SATURATION: 99 %

## 2024-09-12 DIAGNOSIS — E10.42 TYPE 1 DIABETES MELLITUS WITH DIABETIC POLYNEUROPATHY (HCC): Primary | ICD-10-CM

## 2024-09-12 DIAGNOSIS — I10 ESSENTIAL HYPERTENSION: ICD-10-CM

## 2024-09-12 DIAGNOSIS — E78.2 MIXED HYPERLIPIDEMIA: ICD-10-CM

## 2024-09-12 PROCEDURE — 3077F SYST BP >= 140 MM HG: CPT | Performed by: NURSE PRACTITIONER

## 2024-09-12 PROCEDURE — G8417 CALC BMI ABV UP PARAM F/U: HCPCS | Performed by: NURSE PRACTITIONER

## 2024-09-12 PROCEDURE — 99212 OFFICE O/P EST SF 10 MIN: CPT | Performed by: NURSE PRACTITIONER

## 2024-09-12 PROCEDURE — G2211 COMPLEX E/M VISIT ADD ON: HCPCS | Performed by: NURSE PRACTITIONER

## 2024-09-12 PROCEDURE — 2022F DILAT RTA XM EVC RTNOPTHY: CPT | Performed by: NURSE PRACTITIONER

## 2024-09-12 PROCEDURE — 95251 CONT GLUC MNTR ANALYSIS I&R: CPT | Performed by: NURSE PRACTITIONER

## 2024-09-12 PROCEDURE — 3080F DIAST BP >= 90 MM HG: CPT | Performed by: NURSE PRACTITIONER

## 2024-09-12 PROCEDURE — 3046F HEMOGLOBIN A1C LEVEL >9.0%: CPT | Performed by: NURSE PRACTITIONER

## 2024-09-12 PROCEDURE — 4004F PT TOBACCO SCREEN RCVD TLK: CPT | Performed by: NURSE PRACTITIONER

## 2024-09-12 PROCEDURE — G8427 DOCREV CUR MEDS BY ELIG CLIN: HCPCS | Performed by: NURSE PRACTITIONER

## 2024-09-12 PROCEDURE — 99214 OFFICE O/P EST MOD 30 MIN: CPT | Performed by: NURSE PRACTITIONER

## 2024-09-12 ASSESSMENT — ENCOUNTER SYMPTOMS
ABDOMINAL PAIN: 0
DIARRHEA: 0
RESPIRATORY NEGATIVE: 1
SHORTNESS OF BREATH: 0

## 2024-11-10 DIAGNOSIS — E10.42 TYPE 1 DIABETES MELLITUS WITH DIABETIC POLYNEUROPATHY (HCC): ICD-10-CM

## 2024-11-11 RX ORDER — INSULIN PMP CART,AUT,G6/7,CNTR
EACH SUBCUTANEOUS
Qty: 10 EACH | Refills: 5 | Status: SHIPPED | OUTPATIENT
Start: 2024-11-11

## 2024-11-13 RX ORDER — INSULIN LISPRO 100 [IU]/ML
INJECTION, SOLUTION INTRAVENOUS; SUBCUTANEOUS
Qty: 20 ML | Refills: 11 | Status: SHIPPED
Start: 2024-11-13 | End: 2024-11-14 | Stop reason: SDUPTHER

## 2024-11-13 NOTE — TELEPHONE ENCOUNTER
Patient is currently out of insulin for his omnipod he is requesting a ref[facility].

## 2024-11-14 ENCOUNTER — TELEPHONE (OUTPATIENT)
Dept: INTERNAL MEDICINE | Age: 39
End: 2024-11-14

## 2024-11-14 RX ORDER — INSULIN LISPRO 100 [IU]/ML
INJECTION, SOLUTION INTRAVENOUS; SUBCUTANEOUS
Qty: 20 ML | Refills: 11 | Status: SHIPPED | OUTPATIENT
Start: 2024-11-14

## 2024-11-14 NOTE — TELEPHONE ENCOUNTER
Omnipod need PA according to the clinic pharmacy. Patient has now switched to the clinic for the pharmacy.

## 2024-11-14 NOTE — TELEPHONE ENCOUNTER
Pedro called requesting a refill of the below medication which has been pended for you:     Requested Prescriptions     Pending Prescriptions Disp Refills    insulin lispro (HUMALOG,ADMELOG) 100 UNIT/ML SOLN injection vial 20 mL 11     Sig: For use with ompinod max daily dose is 65 units.       Last Appointment Date: Visit date not found  Next Appointment Date: Visit date not found    Allergies   Allergen Reactions    Bee Venom Hives     With swelling    Vancomycin Other (See Comments)     From  care plan - reaction unknown.     Lipitor [Atorvastatin] Hives

## 2024-11-18 NOTE — TELEPHONE ENCOUNTER
PA completed and approved for Omni pod.        Prior authorization approved  Payer: [employer] Patient's Payer Case ID: [id number]    [phone]  Note from payer: Request Reference Number: [id number]. OMNIPOD 5 DX MIS POD G7G6 is approved through 11/18/2025. Your patient may now fill this prescription and it will be covered.

## 2024-12-31 ENCOUNTER — HOSPITAL ENCOUNTER (OUTPATIENT)
Dept: NON INVASIVE DIAGNOSTICS | Age: 39
Discharge: HOME | End: 2024-12-31
Payer: MEDICARE

## 2024-12-31 ENCOUNTER — OFFICE VISIT (OUTPATIENT)
Dept: FAMILY MEDICINE CLINIC | Age: 39
End: 2024-12-31
Payer: MEDICARE

## 2024-12-31 ENCOUNTER — HOSPITAL ENCOUNTER (OUTPATIENT)
Age: 39
Discharge: HOME | End: 2024-12-31
Payer: MEDICARE

## 2024-12-31 VITALS
HEIGHT: 68 IN | BODY MASS INDEX: 31.37 KG/M2 | OXYGEN SATURATION: 98 % | TEMPERATURE: 97.6 F | WEIGHT: 207 LBS | SYSTOLIC BLOOD PRESSURE: 152 MMHG | DIASTOLIC BLOOD PRESSURE: 96 MMHG | HEART RATE: 79 BPM

## 2024-12-31 DIAGNOSIS — Z91.030 ALLERGY TO BEE STING: ICD-10-CM

## 2024-12-31 DIAGNOSIS — Z23 NEED FOR PNEUMOCOCCAL VACCINATION: ICD-10-CM

## 2024-12-31 DIAGNOSIS — Z11.59 NEED FOR HEPATITIS C SCREENING TEST: ICD-10-CM

## 2024-12-31 DIAGNOSIS — Z01.818 PRE-OP EXAM: ICD-10-CM

## 2024-12-31 DIAGNOSIS — H25.811 COMBINED FORMS OF AGE-RELATED CATARACT OF RIGHT EYE: Primary | ICD-10-CM

## 2024-12-31 DIAGNOSIS — I10 ESSENTIAL HYPERTENSION: ICD-10-CM

## 2024-12-31 DIAGNOSIS — Z23 NEED FOR INFLUENZA VACCINATION: ICD-10-CM

## 2024-12-31 DIAGNOSIS — I73.9 PERIPHERAL VASCULAR DISEASE, UNSPECIFIED (HCC): ICD-10-CM

## 2024-12-31 DIAGNOSIS — E10.42 TYPE 1 DIABETES MELLITUS WITH DIABETIC POLYNEUROPATHY (HCC): ICD-10-CM

## 2024-12-31 DIAGNOSIS — Z23 NEED FOR VACCINATION: ICD-10-CM

## 2024-12-31 LAB
ALBUMIN SERPL-MCNC: 2.5 G/DL (ref 3.5–5.2)
ALBUMIN/GLOB SERPL: 0.8 {RATIO} (ref 1–2.5)
ALP SERPL-CCNC: 88 U/L (ref 40–129)
ALT SERPL-CCNC: 14 U/L (ref 5–41)
ANION GAP SERPL CALCULATED.3IONS-SCNC: 11 MMOL/L (ref 9–17)
AST SERPL-CCNC: 20 U/L
BASOPHILS # BLD: <0.03 K/UL (ref 0–0.2)
BASOPHILS NFR BLD: 0 % (ref 0–2)
BILIRUB SERPL-MCNC: 0.1 MG/DL (ref 0.3–1.2)
BUN SERPL-MCNC: 20 MG/DL (ref 6–20)
BUN/CREAT SERPL: 13 (ref 9–20)
CALCIUM SERPL-MCNC: 8.1 MG/DL (ref 8.6–10.4)
CHLORIDE SERPL-SCNC: 105 MMOL/L (ref 98–107)
CHOLEST SERPL-MCNC: 280 MG/DL (ref 0–199)
CHOLESTEROL/HDL RATIO: 5
CO2 SERPL-SCNC: 24 MMOL/L (ref 20–31)
CREAT SERPL-MCNC: 1.6 MG/DL (ref 0.7–1.2)
CREAT UR-MCNC: 191 MG/DL (ref 39–259)
EOSINOPHIL # BLD: 0.08 K/UL (ref 0–0.44)
EOSINOPHILS RELATIVE PERCENT: 1 % (ref 1–4)
ERYTHROCYTE [DISTWIDTH] IN BLOOD BY AUTOMATED COUNT: 11.4 % (ref 11.8–14.4)
EST. AVERAGE GLUCOSE BLD GHB EST-MCNC: 169 MG/DL
GFR, ESTIMATED: 56 ML/MIN/1.73M2
GLUCOSE SERPL-MCNC: 110 MG/DL (ref 70–99)
HBA1C MFR BLD: 7.5 % (ref 4–6)
HCT VFR BLD AUTO: 35.3 % (ref 40.7–50.3)
HCV AB SERPL QL IA: NONREACTIVE
HDLC SERPL-MCNC: 56 MG/DL
HGB BLD-MCNC: 12.4 G/DL (ref 13–17)
IMM GRANULOCYTES # BLD AUTO: <0.03 K/UL (ref 0–0.3)
IMM GRANULOCYTES NFR BLD: 0 %
LDLC SERPL CALC-MCNC: 189 MG/DL (ref 0–100)
LYMPHOCYTES NFR BLD: 1.53 K/UL (ref 1.1–3.7)
LYMPHOCYTES RELATIVE PERCENT: 27 % (ref 24–43)
MCH RBC QN AUTO: 33.2 PG (ref 25.2–33.5)
MCHC RBC AUTO-ENTMCNC: 35.1 G/DL (ref 25.2–33.5)
MCV RBC AUTO: 94.4 FL (ref 82.6–102.9)
MICROALBUMIN UR-MCNC: 9141 MG/L (ref 0–20)
MICROALBUMIN/CREAT UR-RTO: 4786 MCG/MG CREAT (ref 0–17)
MONOCYTES NFR BLD: 0.49 K/UL (ref 0.1–1.2)
MONOCYTES NFR BLD: 9 % (ref 3–12)
NEUTROPHILS NFR BLD: 63 % (ref 36–65)
NEUTS SEG NFR BLD: 3.51 K/UL (ref 1.5–8.1)
NRBC BLD-RTO: 0 PER 100 WBC
PLATELET # BLD AUTO: 307 K/UL (ref 138–453)
PMV BLD AUTO: 9.1 FL (ref 8.1–13.5)
POTASSIUM SERPL-SCNC: 3.8 MMOL/L (ref 3.7–5.3)
PROT SERPL-MCNC: 5.7 G/DL (ref 6.4–8.3)
RBC # BLD AUTO: 3.74 M/UL (ref 4.21–5.77)
SODIUM SERPL-SCNC: 140 MMOL/L (ref 135–144)
TRIGL SERPL-MCNC: 177 MG/DL
VLDLC SERPL CALC-MCNC: 35 MG/DL (ref 1–30)
WBC OTHER # BLD: 5.6 K/UL (ref 3.5–11.3)

## 2024-12-31 PROCEDURE — 82043 UR ALBUMIN QUANTITATIVE: CPT

## 2024-12-31 PROCEDURE — 80061 LIPID PANEL: CPT

## 2024-12-31 PROCEDURE — 36415 COLL VENOUS BLD VENIPUNCTURE: CPT

## 2024-12-31 PROCEDURE — 85025 COMPLETE CBC W/AUTO DIFF WBC: CPT

## 2024-12-31 PROCEDURE — 93005 ELECTROCARDIOGRAM TRACING: CPT | Performed by: FAMILY MEDICINE

## 2024-12-31 PROCEDURE — 80053 COMPREHEN METABOLIC PANEL: CPT

## 2024-12-31 PROCEDURE — 83036 HEMOGLOBIN GLYCOSYLATED A1C: CPT

## 2024-12-31 PROCEDURE — 82570 ASSAY OF URINE CREATININE: CPT

## 2024-12-31 PROCEDURE — 86803 HEPATITIS C AB TEST: CPT

## 2024-12-31 RX ORDER — LISINOPRIL 5 MG/1
5 TABLET ORAL DAILY
Qty: 90 TABLET | Refills: 1 | Status: SHIPPED | OUTPATIENT
Start: 2024-12-31

## 2024-12-31 RX ORDER — EPINEPHRINE 0.3 MG/.3ML
INJECTION SUBCUTANEOUS
Qty: 1 EACH | Refills: 1 | Status: SHIPPED | OUTPATIENT
Start: 2024-12-31

## 2024-12-31 SDOH — ECONOMIC STABILITY: FOOD INSECURITY: WITHIN THE PAST 12 MONTHS, THE FOOD YOU BOUGHT JUST DIDN'T LAST AND YOU DIDN'T HAVE MONEY TO GET MORE.: NEVER TRUE

## 2024-12-31 SDOH — ECONOMIC STABILITY: FOOD INSECURITY: WITHIN THE PAST 12 MONTHS, YOU WORRIED THAT YOUR FOOD WOULD RUN OUT BEFORE YOU GOT MONEY TO BUY MORE.: NEVER TRUE

## 2024-12-31 SDOH — ECONOMIC STABILITY: INCOME INSECURITY: HOW HARD IS IT FOR YOU TO PAY FOR THE VERY BASICS LIKE FOOD, HOUSING, MEDICAL CARE, AND HEATING?: NOT HARD AT ALL

## 2024-12-31 ASSESSMENT — PATIENT HEALTH QUESTIONNAIRE - PHQ9
SUM OF ALL RESPONSES TO PHQ QUESTIONS 1-9: 0
1. LITTLE INTEREST OR PLEASURE IN DOING THINGS: NOT AT ALL
2. FEELING DOWN, DEPRESSED OR HOPELESS: NOT AT ALL
SUM OF ALL RESPONSES TO PHQ QUESTIONS 1-9: 0
SUM OF ALL RESPONSES TO PHQ QUESTIONS 1-9: 0
SUM OF ALL RESPONSES TO PHQ9 QUESTIONS 1 & 2: 0
SUM OF ALL RESPONSES TO PHQ QUESTIONS 1-9: 0

## 2024-12-31 ASSESSMENT — ENCOUNTER SYMPTOMS
BLOOD IN STOOL: 0
SHORTNESS OF BREATH: 0
VOMITING: 0
DIARRHEA: 0

## 2024-12-31 NOTE — PROGRESS NOTES
[facility]  [address] [id number]  [phone]      Pedro Sotelo is a 39 y.o. male who presents today for his medical conditions/complaints as noted below.  Pedro Sotelo is c/o of Pre-op Exam (Cataract right eye, Dr Meier Jan 14th at [facility] [place])      HPI:     Pt here today for pre-op exam.    Pt is scheduled for cataract extraction from R eye with Dr. Meier on 1/14/25 at [facility] [place].     No h/o anesthesia reaction.  No family h/o malignant hyperthermia.  No h/o blood transfusion.    No h/o MI, CVA/TIA, DVT/PE, CHF, cardiac arrhythmia, HOMERO, or renal failure.      Pt is type 1 diabetic - due for updated A1c today.      Used to be on HTN medication - Lisinopril 5 mg daily.  Has been off for approx 1 year now.  BP slightly elevated today - 152/96.      Functional capacity >4 METS.          Past Medical History:   Diagnosis Date    Asthma     Diabetes mellitus (HCC)     type 1    Diabetic ketoacidosis associated with type 1 diabetes mellitus (HCC) 04/28/2017    Head injury     Hyperlipidemia     Hypertension     Male erectile dysfunction     Migraine     Neuropathy in diabetes (HCC)       Past Surgical History:   Procedure Laterality Date    APPENDECTOMY      PARS PLANA VITRECTOMY  03/09/2023    [facility]    PARS PLANA VITRECTOMY  04/27/2023    [facility]    TYMPANOSTOMY TUBE PLACEMENT       Family History   Problem Relation Age of Onset    Diabetes Mother         type 1    Heart Disease Mother     Kidney Disease Mother     High Cholesterol Mother     High Blood Pressure Mother     High Blood Pressure Father     Diabetes Father         type 2    Heart Disease Father      Social History     Tobacco Use    Smoking status: Some Days     Current packs/day: 0.25     Average packs/day: 0.3 packs/day for 20.0 years (5.0 ttl pk-yrs)     Types: Cigarettes     Start date: 1/1/2005    Smokeless tobacco: Never    Tobacco

## 2025-01-02 LAB
EKG ATRIAL RATE: 84 BPM
EKG P AXIS: 28 DEGREES
EKG P-R INTERVAL: 130 MS
EKG Q-T INTERVAL: 372 MS
EKG QRS DURATION: 102 MS
EKG QTC CALCULATION (BAZETT): 439 MS
EKG R AXIS: 48 DEGREES
EKG T AXIS: 32 DEGREES
EKG VENTRICULAR RATE: 84 BPM

## 2025-01-08 DIAGNOSIS — R79.89 ELEVATED SERUM CREATININE: Primary | ICD-10-CM

## 2025-01-10 ENCOUNTER — TELEPHONE (OUTPATIENT)
Dept: FAMILY MEDICINE CLINIC | Age: 40
End: 2025-01-10

## 2025-01-10 ENCOUNTER — PATIENT MESSAGE (OUTPATIENT)
Dept: FAMILY MEDICINE CLINIC | Age: 40
End: 2025-01-10

## 2025-01-10 ENCOUNTER — HOSPITAL ENCOUNTER (OUTPATIENT)
Age: 40
Discharge: HOME | End: 2025-01-10
Payer: MEDICARE

## 2025-01-10 DIAGNOSIS — R79.89 ELEVATED SERUM CREATININE: ICD-10-CM

## 2025-01-10 DIAGNOSIS — R79.89 ELEVATED SERUM CREATININE: Primary | ICD-10-CM

## 2025-01-10 LAB
ANION GAP SERPL CALCULATED.3IONS-SCNC: 9 MMOL/L (ref 9–17)
BUN SERPL-MCNC: 26 MG/DL (ref 6–20)
BUN/CREAT SERPL: 17 (ref 9–20)
CALCIUM SERPL-MCNC: 8.7 MG/DL (ref 8.6–10.4)
CHLORIDE SERPL-SCNC: 106 MMOL/L (ref 98–107)
CO2 SERPL-SCNC: 27 MMOL/L (ref 20–31)
CREAT SERPL-MCNC: 1.5 MG/DL (ref 0.7–1.2)
GFR, ESTIMATED: 60 ML/MIN/1.73M2
GLUCOSE SERPL-MCNC: 129 MG/DL (ref 70–99)
POTASSIUM SERPL-SCNC: 3.4 MMOL/L (ref 3.7–5.3)
SODIUM SERPL-SCNC: 142 MMOL/L (ref 135–144)

## 2025-01-10 PROCEDURE — 36415 COLL VENOUS BLD VENIPUNCTURE: CPT

## 2025-01-10 PROCEDURE — 80048 BASIC METABOLIC PNL TOTAL CA: CPT

## 2025-01-10 NOTE — TELEPHONE ENCOUNTER
Writer faxed via [employer] but received Transmission Error.   Writer had Bertha HASTINGS fax from Urgent Care fax but also received errors.  Writer called to [facility], reached  and left detailed message regarding surgical clearance, pt is cleared and [facility].  Called to [place] office, states to fax them the papers and they will make sure his office gets it.  Writer faxed [place] office [phone] now. Upon arrival to fax machine, Successful confirmation to Dr. Meier's office was received (for number [phone]).

## 2025-01-24 DIAGNOSIS — E10.42 TYPE 1 DIABETES MELLITUS WITH DIABETIC POLYNEUROPATHY (HCC): ICD-10-CM

## 2025-01-27 RX ORDER — PROCHLORPERAZINE 25 MG/1
SUPPOSITORY RECTAL
Qty: 6 EACH | Refills: 5 | Status: SHIPPED | OUTPATIENT
Start: 2025-01-27

## 2025-01-27 NOTE — TELEPHONE ENCOUNTER
Refill request pended for your review.     Last Appt:  Visit date not found  Next Appt:  2/5/2025

## 2025-02-05 ENCOUNTER — OFFICE VISIT (OUTPATIENT)
Dept: DIABETES SERVICES | Age: 40
End: 2025-02-05
Payer: COMMERCIAL

## 2025-02-05 VITALS
HEART RATE: 89 BPM | SYSTOLIC BLOOD PRESSURE: 134 MMHG | WEIGHT: 212.8 LBS | BODY MASS INDEX: 29.79 KG/M2 | DIASTOLIC BLOOD PRESSURE: 82 MMHG | HEIGHT: 71 IN | OXYGEN SATURATION: 98 %

## 2025-02-05 DIAGNOSIS — I10 ESSENTIAL HYPERTENSION: ICD-10-CM

## 2025-02-05 DIAGNOSIS — E78.2 MIXED HYPERLIPIDEMIA: ICD-10-CM

## 2025-02-05 DIAGNOSIS — E10.42 TYPE 1 DIABETES MELLITUS WITH DIABETIC POLYNEUROPATHY (HCC): Primary | ICD-10-CM

## 2025-02-05 PROCEDURE — 3046F HEMOGLOBIN A1C LEVEL >9.0%: CPT | Performed by: NURSE PRACTITIONER

## 2025-02-05 PROCEDURE — 99214 OFFICE O/P EST MOD 30 MIN: CPT | Performed by: NURSE PRACTITIONER

## 2025-02-05 PROCEDURE — G8417 CALC BMI ABV UP PARAM F/U: HCPCS | Performed by: NURSE PRACTITIONER

## 2025-02-05 PROCEDURE — 4004F PT TOBACCO SCREEN RCVD TLK: CPT | Performed by: NURSE PRACTITIONER

## 2025-02-05 PROCEDURE — 95251 CONT GLUC MNTR ANALYSIS I&R: CPT | Performed by: NURSE PRACTITIONER

## 2025-02-05 PROCEDURE — 3079F DIAST BP 80-89 MM HG: CPT | Performed by: NURSE PRACTITIONER

## 2025-02-05 PROCEDURE — G2211 COMPLEX E/M VISIT ADD ON: HCPCS | Performed by: NURSE PRACTITIONER

## 2025-02-05 PROCEDURE — G8427 DOCREV CUR MEDS BY ELIG CLIN: HCPCS | Performed by: NURSE PRACTITIONER

## 2025-02-05 PROCEDURE — 3075F SYST BP GE 130 - 139MM HG: CPT | Performed by: NURSE PRACTITIONER

## 2025-02-05 PROCEDURE — 2022F DILAT RTA XM EVC RTNOPTHY: CPT | Performed by: NURSE PRACTITIONER

## 2025-02-05 ASSESSMENT — ENCOUNTER SYMPTOMS
RESPIRATORY NEGATIVE: 1
SHORTNESS OF BREATH: 0

## 2025-02-05 NOTE — PROGRESS NOTES
182.  Insulin pump settings downloaded and reviewed. Scanned into media tab.   CGM report downloaded and reviewed from the past 2 weeks scanned to media tab.     Hemoglobin A1C   Date Value Ref Range Status   12/31/2024 7.5 (H) 4.0 - 6.0 % Final      Results       ** No results found for the last 336 hours. **          Lab Results   Component Value Date/Time     01/10/2025 12:22 PM    K 3.4 01/10/2025 12:22 PM     01/10/2025 12:22 PM    CO2 27 01/10/2025 12:22 PM    BUN 26 01/10/2025 12:22 PM    CREATININE 1.5 01/10/2025 12:22 PM    GLUCOSE 129 01/10/2025 12:22 PM    CALCIUM 8.7 01/10/2025 12:22 PM    LABGLOM 60 01/10/2025 12:22 PM    LABGLOM >60 07/05/2023 04:58 PM      Lab Results   Component Value Date    CHOL 280 (H) 12/31/2024    TRIG 177 (H) 12/31/2024    HDL 56 12/31/2024     (H) 12/31/2024    VLDL 35 (H) 12/31/2024    CHOLHDLRATIO 5.0 12/31/2024     Lab Results   Component Value Date    ALT 14 12/31/2024    AST 20 12/31/2024    ALKPHOS 88 12/31/2024    BILITOT 0.1 (L) 12/31/2024             Attestation    The patient (or guardian, if applicable) and other individuals in attendance with the patient were advised that Artificial Intelligence will be utilized during this visit to record, process the conversation to generate a clinical note, and support improvement of the AI technology. The patient (or guardian, if applicable) and other individuals in attendance at the appointment consented to the use of AI, including the recording.        Electronically signed by BEATRIS Barroso CNP on 2/5/2025 at 9:49 AM      (Please note that portions of this note were completed with a voice-recognition program. Efforts were made to edit the dictation but occasionally words are mis-transcribed.)

## 2025-02-12 ENCOUNTER — APPOINTMENT (OUTPATIENT)
Dept: CT IMAGING | Age: 40
End: 2025-02-12
Payer: COMMERCIAL

## 2025-02-12 ENCOUNTER — HOSPITAL ENCOUNTER (EMERGENCY)
Age: 40
Discharge: HOME | End: 2025-02-12
Attending: EMERGENCY MEDICINE
Payer: COMMERCIAL

## 2025-02-12 VITALS
OXYGEN SATURATION: 97 % | WEIGHT: 205 LBS | RESPIRATION RATE: 18 BRPM | BODY MASS INDEX: 30.36 KG/M2 | DIASTOLIC BLOOD PRESSURE: 116 MMHG | HEIGHT: 69 IN | TEMPERATURE: 98.1 F | HEART RATE: 86 BPM | SYSTOLIC BLOOD PRESSURE: 189 MMHG

## 2025-02-12 DIAGNOSIS — R51.9 NONINTRACTABLE HEADACHE, UNSPECIFIED CHRONICITY PATTERN, UNSPECIFIED HEADACHE TYPE: Primary | ICD-10-CM

## 2025-02-12 DIAGNOSIS — I15.9 SECONDARY HYPERTENSION: ICD-10-CM

## 2025-02-12 PROCEDURE — 96374 THER/PROPH/DIAG INJ IV PUSH: CPT

## 2025-02-12 PROCEDURE — 99284 EMERGENCY DEPT VISIT MOD MDM: CPT

## 2025-02-12 PROCEDURE — 6360000002 HC RX W HCPCS: Performed by: EMERGENCY MEDICINE

## 2025-02-12 PROCEDURE — 96375 TX/PRO/DX INJ NEW DRUG ADDON: CPT

## 2025-02-12 PROCEDURE — 6370000000 HC RX 637 (ALT 250 FOR IP): Performed by: EMERGENCY MEDICINE

## 2025-02-12 PROCEDURE — 2580000003 HC RX 258: Performed by: EMERGENCY MEDICINE

## 2025-02-12 PROCEDURE — 70450 CT HEAD/BRAIN W/O DYE: CPT

## 2025-02-12 RX ORDER — MORPHINE SULFATE 4 MG/ML
4 INJECTION, SOLUTION INTRAMUSCULAR; INTRAVENOUS
Status: COMPLETED | OUTPATIENT
Start: 2025-02-12 | End: 2025-02-12

## 2025-02-12 RX ORDER — ONDANSETRON 2 MG/ML
4 INJECTION INTRAMUSCULAR; INTRAVENOUS ONCE
Status: COMPLETED | OUTPATIENT
Start: 2025-02-12 | End: 2025-02-12

## 2025-02-12 RX ORDER — 0.9 % SODIUM CHLORIDE 0.9 %
1000 INTRAVENOUS SOLUTION INTRAVENOUS ONCE
Status: COMPLETED | OUTPATIENT
Start: 2025-02-12 | End: 2025-02-12

## 2025-02-12 RX ORDER — LISINOPRIL 5 MG/1
10 TABLET ORAL DAILY
Status: DISCONTINUED | OUTPATIENT
Start: 2025-02-12 | End: 2025-02-12 | Stop reason: HOSPADM

## 2025-02-12 RX ORDER — KETOROLAC TROMETHAMINE 30 MG/ML
15 INJECTION, SOLUTION INTRAMUSCULAR; INTRAVENOUS ONCE
Status: COMPLETED | OUTPATIENT
Start: 2025-02-12 | End: 2025-02-12

## 2025-02-12 RX ADMIN — KETOROLAC TROMETHAMINE 15 MG: 30 INJECTION, SOLUTION INTRAMUSCULAR at 14:44

## 2025-02-12 RX ADMIN — SODIUM CHLORIDE 1000 ML: 9 INJECTION, SOLUTION INTRAVENOUS at 14:43

## 2025-02-12 RX ADMIN — LISINOPRIL 10 MG: 5 TABLET ORAL at 14:47

## 2025-02-12 RX ADMIN — ONDANSETRON 4 MG: 2 INJECTION INTRAMUSCULAR; INTRAVENOUS at 14:44

## 2025-02-12 RX ADMIN — MORPHINE SULFATE 4 MG: 4 INJECTION, SOLUTION INTRAMUSCULAR; INTRAVENOUS at 15:51

## 2025-02-12 ASSESSMENT — PAIN DESCRIPTION - PAIN TYPE: TYPE: ACUTE PAIN

## 2025-02-12 ASSESSMENT — LIFESTYLE VARIABLES
HOW MANY STANDARD DRINKS CONTAINING ALCOHOL DO YOU HAVE ON A TYPICAL DAY: 1 OR 2
HOW OFTEN DO YOU HAVE A DRINK CONTAINING ALCOHOL: MONTHLY OR LESS

## 2025-02-12 ASSESSMENT — PAIN SCALES - GENERAL
PAINLEVEL_OUTOF10: 7
PAINLEVEL_OUTOF10: 7
PAINLEVEL_OUTOF10: 6

## 2025-02-12 ASSESSMENT — PAIN DESCRIPTION - DESCRIPTORS: DESCRIPTORS: ACHING

## 2025-02-12 ASSESSMENT — PAIN DESCRIPTION - LOCATION: LOCATION: HEAD

## 2025-02-12 ASSESSMENT — PAIN - FUNCTIONAL ASSESSMENT: PAIN_FUNCTIONAL_ASSESSMENT: 0-10

## 2025-02-12 NOTE — ED PROVIDER NOTES
admission, final decision will be based on test results and patient status    Prescription considerations: See below    Critical Care note written: See below    Sepsis considered: Considered, no criteria met      DIAGNOSTIC RESULTS     EKG: All EKG's are interpreted by the Emergency Department Physician who either signs or Co-signs this chart in the absence of a cardiologist.        Not indicated unless otherwise documented above    LABS:  No results found for this visit on 02/12/25.    Not indicated unless otherwise documented above    RADIOLOGY:   I reviewed the radiologist interpretations and visualized all plain films:    CT HEAD WO CONTRAST   Final Result   No acute intracranial abnormality.             Not indicated unless otherwise documented above    EMERGENCY DEPARTMENT COURSE:     The patient was given the following medications:  Orders Placed This Encounter   Medications    sodium chloride 0.9 % bolus 1,000 mL    ondansetron (ZOFRAN) injection 4 mg    ketorolac (TORADOL) injection 15 mg    DISCONTD: lisinopril (PRINIVIL;ZESTRIL) tablet 10 mg    morphine (PF) injection 4 mg        Vitals:   -------------------------  BP (!) 189/116   Pulse 86   Temp 98.1 °F (36.7 °C) (Tympanic)   Resp 18   Ht 1.753 m (5' 9\")   Wt 93 kg (205 lb)   SpO2 97%   BMI 30.27 kg/m²     Patient is given 1 L normal saline, Zofran and Toradol.  He was hypertensive on arrival he had not taken his hypertensive medication he was given 10 mg p.o. which helped his symptoms.  His blood pressure has improved he does not have any focal neurologic deficit.  CT scan of his brain is unremarkable.  Headache has resolved.  Patient stable for outpatient follow-up and treatment.  The patient will go home and take his regular dose of lisinopril.    At this time the patient is without objective evidence of an acute process requiring hospitalization or inpatient management.  The patient has remained hemodynamically stable.  No additional

## 2025-02-17 DIAGNOSIS — E10.42 TYPE 1 DIABETES MELLITUS WITH DIABETIC POLYNEUROPATHY (HCC): Primary | ICD-10-CM

## 2025-02-17 RX ORDER — ACYCLOVIR 400 MG/1
3 TABLET ORAL
Qty: 3 EACH | Refills: 5 | Status: SHIPPED | OUTPATIENT
Start: 2025-02-17

## 2025-02-17 NOTE — TELEPHONE ENCOUNTER
Writer reviewed patient chart to find he was on a Dexcom 7 with Omnipod.  Writer called patient for clarification.  Patient states that at last OV he was given an 7 and was told Dexcom 7 sensors would be sent in. Dexcom 7 sensor pended for your review to [facility].  Sample 7 sensor placed ad front window for patient . Patient aware.

## 2025-04-08 ENCOUNTER — OFFICE VISIT (OUTPATIENT)
Dept: FAMILY MEDICINE CLINIC | Age: 40
End: 2025-04-08
Payer: COMMERCIAL

## 2025-04-08 VITALS
HEART RATE: 83 BPM | DIASTOLIC BLOOD PRESSURE: 78 MMHG | HEIGHT: 69 IN | TEMPERATURE: 98 F | SYSTOLIC BLOOD PRESSURE: 146 MMHG | OXYGEN SATURATION: 99 % | BODY MASS INDEX: 32.24 KG/M2 | RESPIRATION RATE: 16 BRPM | WEIGHT: 217.7 LBS

## 2025-04-08 DIAGNOSIS — I10 ESSENTIAL HYPERTENSION: ICD-10-CM

## 2025-04-08 DIAGNOSIS — B36.9 FUNGAL DERMATITIS: ICD-10-CM

## 2025-04-08 DIAGNOSIS — E10.42 TYPE 1 DIABETES MELLITUS WITH DIABETIC POLYNEUROPATHY (HCC): Primary | ICD-10-CM

## 2025-04-08 DIAGNOSIS — E78.2 MIXED HYPERLIPIDEMIA: ICD-10-CM

## 2025-04-08 PROBLEM — E11.621 DIABETIC FOOT ULCER (HCC): Status: RESOLVED | Noted: 2020-12-07 | Resolved: 2025-04-08

## 2025-04-08 PROBLEM — L97.509 DIABETIC FOOT ULCER (HCC): Status: RESOLVED | Noted: 2020-12-07 | Resolved: 2025-04-08

## 2025-04-08 PROCEDURE — 3046F HEMOGLOBIN A1C LEVEL >9.0%: CPT | Performed by: FAMILY MEDICINE

## 2025-04-08 PROCEDURE — 3078F DIAST BP <80 MM HG: CPT | Performed by: FAMILY MEDICINE

## 2025-04-08 PROCEDURE — 99214 OFFICE O/P EST MOD 30 MIN: CPT | Performed by: FAMILY MEDICINE

## 2025-04-08 PROCEDURE — G8427 DOCREV CUR MEDS BY ELIG CLIN: HCPCS | Performed by: FAMILY MEDICINE

## 2025-04-08 PROCEDURE — 3077F SYST BP >= 140 MM HG: CPT | Performed by: FAMILY MEDICINE

## 2025-04-08 PROCEDURE — 4004F PT TOBACCO SCREEN RCVD TLK: CPT | Performed by: FAMILY MEDICINE

## 2025-04-08 PROCEDURE — 2022F DILAT RTA XM EVC RTNOPTHY: CPT | Performed by: FAMILY MEDICINE

## 2025-04-08 PROCEDURE — G8417 CALC BMI ABV UP PARAM F/U: HCPCS | Performed by: FAMILY MEDICINE

## 2025-04-08 RX ORDER — SIMVASTATIN 20 MG
20 TABLET ORAL EVERY EVENING
Qty: 90 TABLET | Refills: 3 | Status: SHIPPED | OUTPATIENT
Start: 2025-04-08

## 2025-04-08 RX ORDER — CLOTRIMAZOLE 1 %
CREAM (GRAM) TOPICAL
Qty: 28 G | Refills: 0 | Status: SHIPPED | OUTPATIENT
Start: 2025-04-08 | End: 2025-04-15

## 2025-04-08 SDOH — ECONOMIC STABILITY: FOOD INSECURITY: WITHIN THE PAST 12 MONTHS, THE FOOD YOU BOUGHT JUST DIDN'T LAST AND YOU DIDN'T HAVE MONEY TO GET MORE.: NEVER TRUE

## 2025-04-08 SDOH — ECONOMIC STABILITY: FOOD INSECURITY: WITHIN THE PAST 12 MONTHS, YOU WORRIED THAT YOUR FOOD WOULD RUN OUT BEFORE YOU GOT MONEY TO BUY MORE.: NEVER TRUE

## 2025-04-08 ASSESSMENT — PATIENT HEALTH QUESTIONNAIRE - PHQ9
2. FEELING DOWN, DEPRESSED OR HOPELESS: NOT AT ALL
SUM OF ALL RESPONSES TO PHQ QUESTIONS 1-9: 0
SUM OF ALL RESPONSES TO PHQ QUESTIONS 1-9: 0
1. LITTLE INTEREST OR PLEASURE IN DOING THINGS: NOT AT ALL
SUM OF ALL RESPONSES TO PHQ QUESTIONS 1-9: 0
SUM OF ALL RESPONSES TO PHQ QUESTIONS 1-9: 0

## 2025-04-08 NOTE — PROGRESS NOTES
[facility]  [address] [place] [address]  [phone]      Pedro Sotelo is a 40 y.o. male who presents today for his medical conditions/complaints as noted below.  Pedro Sotelo is c/o of Diabetes and Hypertension      HPI:     History of Present Illness  The patient is a 40-year-old male who presents today for follow-up of diabetes and hypertension.    He reports persistent blurry vision following eye surgery, which initially improved post-surgery but subsequently deteriorated. He has been diagnosed with diabetic retinopathy, characterized by bleeding in the eye, and received an injection of bevacizumab 25 mg on 03/25/2025. Despite this treatment, he perceives a worsening of his condition, accompanied by intermittent eye pain and pressure. He also experiences a constant headache. He is scheduled for a follow-up appointment with Dr. Ortiz on 04/22/2025. He has not had a recent consultation with his optometrist, Dr. Batres. He has been on leave from work due to his eye condition since 10/2024, having only worked for 1.5 weeks during this period. He is considering applying for disability benefits.    Blood pressure monitoring has not been consistent at home or during other visits. The last recorded high blood pressure was during his cataract surgery. He admits to inconsistent adherence to his lisinopril regimen.    Vitamin supplements, including calcium, have been discontinued as his levels have normalized. His diet is rich in eggs and meat, occasionally consuming steak or chicken. Breakfast typically consists of eggs and bread, with cheese as an additional source of protein. Cereal is not consumed regularly.    Simvastatin was previously used for cholesterol management but was discontinued due to forgetfulness. He expresses willingness to resume this medication.    A rough, spreading rash on both feet is reported, suspected to be related to diabetes. The

## 2025-05-05 ENCOUNTER — PATIENT MESSAGE (OUTPATIENT)
Dept: DIABETES SERVICES | Age: 40
End: 2025-05-05

## 2025-06-03 DIAGNOSIS — E10.42 TYPE 1 DIABETES MELLITUS WITH DIABETIC POLYNEUROPATHY (HCC): ICD-10-CM

## 2025-06-03 DIAGNOSIS — B36.9 FUNGAL DERMATITIS: ICD-10-CM

## 2025-06-03 RX ORDER — INSULIN PMP CART,AUT,G6/7,CNTR
EACH SUBCUTANEOUS
Qty: 10 EACH | Refills: 5 | Status: SHIPPED | OUTPATIENT
Start: 2025-06-03

## 2025-06-03 NOTE — TELEPHONE ENCOUNTER
Refill request pended for your review.     Last Appt:  2/5/2025  Next Appt:  Visit date not found

## 2025-06-03 NOTE — TELEPHONE ENCOUNTER
Pedro called requesting a refill of the below medication which has been pended for you:     Requested Prescriptions     Pending Prescriptions Disp Refills    clotrimazole (LOTRIMIN) 1 % cream [Pharmacy Med Name: CLOTRIMAZOLE 1% CREA] 30 g 0     Sig: APPLY TO AFFECTED AREA(S) 2 TIMES A DAY FOR 10 DAYS       Last Appointment Date: 4/8/2025  Next Appointment Date: 7/9/2025    Allergies   Allergen Reactions    Bee Venom Hives     With swelling    Vancomycin Other (See Comments)     From  care plan - reaction unknown.     Lipitor [Atorvastatin] Hives

## 2025-06-05 RX ORDER — CLOTRIMAZOLE 1 %
CREAM (GRAM) TOPICAL
Qty: 30 G | Refills: 1 | Status: SHIPPED | OUTPATIENT
Start: 2025-06-05

## 2025-07-09 ENCOUNTER — OFFICE VISIT (OUTPATIENT)
Dept: FAMILY MEDICINE CLINIC | Age: 40
End: 2025-07-09
Payer: COMMERCIAL

## 2025-07-09 VITALS
RESPIRATION RATE: 16 BRPM | BODY MASS INDEX: 32.19 KG/M2 | WEIGHT: 217.3 LBS | HEIGHT: 69 IN | DIASTOLIC BLOOD PRESSURE: 80 MMHG | SYSTOLIC BLOOD PRESSURE: 120 MMHG | TEMPERATURE: 97.9 F | OXYGEN SATURATION: 99 % | HEART RATE: 90 BPM

## 2025-07-09 DIAGNOSIS — I10 ESSENTIAL HYPERTENSION: ICD-10-CM

## 2025-07-09 DIAGNOSIS — E78.2 MIXED HYPERLIPIDEMIA: ICD-10-CM

## 2025-07-09 DIAGNOSIS — E10.42 TYPE 1 DIABETES MELLITUS WITH DIABETIC POLYNEUROPATHY (HCC): Primary | ICD-10-CM

## 2025-07-09 DIAGNOSIS — R13.10 DYSPHAGIA, UNSPECIFIED TYPE: ICD-10-CM

## 2025-07-09 DIAGNOSIS — R11.0 NAUSEA: ICD-10-CM

## 2025-07-09 DIAGNOSIS — K92.0 HEMATEMESIS WITH NAUSEA: ICD-10-CM

## 2025-07-09 LAB — HBA1C MFR BLD: 6.2 %

## 2025-07-09 PROCEDURE — G8417 CALC BMI ABV UP PARAM F/U: HCPCS | Performed by: FAMILY MEDICINE

## 2025-07-09 PROCEDURE — 99214 OFFICE O/P EST MOD 30 MIN: CPT | Performed by: FAMILY MEDICINE

## 2025-07-09 PROCEDURE — 3074F SYST BP LT 130 MM HG: CPT | Performed by: FAMILY MEDICINE

## 2025-07-09 PROCEDURE — G8427 DOCREV CUR MEDS BY ELIG CLIN: HCPCS | Performed by: FAMILY MEDICINE

## 2025-07-09 PROCEDURE — 2022F DILAT RTA XM EVC RTNOPTHY: CPT | Performed by: FAMILY MEDICINE

## 2025-07-09 PROCEDURE — G2211 COMPLEX E/M VISIT ADD ON: HCPCS | Performed by: FAMILY MEDICINE

## 2025-07-09 PROCEDURE — 4004F PT TOBACCO SCREEN RCVD TLK: CPT | Performed by: FAMILY MEDICINE

## 2025-07-09 PROCEDURE — 83036 HEMOGLOBIN GLYCOSYLATED A1C: CPT | Performed by: FAMILY MEDICINE

## 2025-07-09 PROCEDURE — 3044F HG A1C LEVEL LT 7.0%: CPT | Performed by: FAMILY MEDICINE

## 2025-07-09 PROCEDURE — 3079F DIAST BP 80-89 MM HG: CPT | Performed by: FAMILY MEDICINE

## 2025-07-09 RX ORDER — OMEPRAZOLE 40 MG/1
40 CAPSULE, DELAYED RELEASE ORAL DAILY
Qty: 30 CAPSULE | Refills: 0 | Status: SHIPPED | OUTPATIENT
Start: 2025-07-09

## 2025-07-09 RX ORDER — ONDANSETRON 4 MG/1
4 TABLET, ORALLY DISINTEGRATING ORAL EVERY 8 HOURS PRN
Qty: 30 TABLET | Refills: 1 | Status: SHIPPED | OUTPATIENT
Start: 2025-07-09

## 2025-07-09 RX ORDER — LISINOPRIL 5 MG/1
5 TABLET ORAL DAILY
Qty: 90 TABLET | Refills: 3 | Status: ON HOLD | OUTPATIENT
Start: 2025-07-09 | End: 2025-07-24 | Stop reason: HOSPADM

## 2025-07-09 NOTE — PROGRESS NOTES
pain, but these have not been effective. He has been experiencing constant stomach pain for the past month, which is somewhat alleviated by sleeping with a pillow against his stomach. He describes the pain as throbbing and notes that it is present during swallowing and talking. The symptoms can occur at any time of day and have occasionally woken him from sleep. He reports no swollen glands or neck swelling. He feels like something is pushing in from the outside and there is less room for things to move by. He has not tried Zofran before and has no history of using omeprazole or Nexium.    He has been on simvastatin for about 3 months but occasionally misses doses. He reports no side effects from the medication.    He has been taking lisinopril daily without any issues.    FAMILY HISTORY  The patient mentions that acid reflux runs on his father's side.      Past Medical History:   Diagnosis Date    Asthma     Diabetes mellitus (HCC)     type 1    Diabetic ketoacidosis associated with type 1 diabetes mellitus ([facility]) 04/28/2017    Head injury     Hyperlipidemia     Hypertension     Male erectile dysfunction     Migraine     Neuropathy in diabetes ([facility])       Past Surgical History:   Procedure Laterality Date    APPENDECTOMY      PARS PLANA VITRECTOMY  03/09/2023    [facility]    PARS PLANA VITRECTOMY  04/27/2023    [facility]    TYMPANOSTOMY TUBE PLACEMENT       Family History   Problem Relation Age of Onset    Diabetes Mother         type 1    Heart Disease Mother     Kidney Disease Mother     High Cholesterol Mother     High Blood Pressure Mother     High Blood Pressure Father     Diabetes Father         type 2    Heart Disease Father      Social History     Tobacco Use    Smoking status: Some Days     Current packs/day: 0.25     Average packs/day: 0.3 packs/day for 20.6 years (5.1 ttl pk-yrs)     Types: Cigarettes     Start date: 1/1/2005    Smokeless tobacco: Never    Tobacco comments:

## 2025-07-14 ENCOUNTER — APPOINTMENT (OUTPATIENT)
Dept: CT IMAGING | Age: 40
End: 2025-07-14
Payer: COMMERCIAL

## 2025-07-14 ENCOUNTER — HOSPITAL ENCOUNTER (EMERGENCY)
Age: 40
Discharge: ANOTHER HOSPITAL | End: 2025-07-14
Attending: STUDENT IN AN ORGANIZED HEALTH CARE EDUCATION/TRAINING PROGRAM
Payer: COMMERCIAL

## 2025-07-14 VITALS
RESPIRATION RATE: 18 BRPM | TEMPERATURE: 97.9 F | HEART RATE: 93 BPM | SYSTOLIC BLOOD PRESSURE: 158 MMHG | DIASTOLIC BLOOD PRESSURE: 97 MMHG | OXYGEN SATURATION: 98 %

## 2025-07-14 DIAGNOSIS — I16.1 HYPERTENSIVE EMERGENCY: Primary | ICD-10-CM

## 2025-07-14 DIAGNOSIS — R79.89 ELEVATED TROPONIN: ICD-10-CM

## 2025-07-14 DIAGNOSIS — K92.0 HEMATEMESIS WITHOUT NAUSEA: ICD-10-CM

## 2025-07-14 DIAGNOSIS — N17.9 ACUTE RENAL FAILURE, UNSPECIFIED ACUTE RENAL FAILURE TYPE: ICD-10-CM

## 2025-07-14 PROBLEM — I16.9 HYPERTENSIVE CRISIS: Status: ACTIVE | Noted: 2025-07-14

## 2025-07-14 LAB
ALBUMIN SERPL-MCNC: 2.6 G/DL (ref 3.5–5.2)
ALBUMIN/GLOB SERPL: 0.9 {RATIO} (ref 1–2.5)
ALP SERPL-CCNC: 98 U/L (ref 40–129)
ALT SERPL-CCNC: 9 U/L (ref 10–50)
ANION GAP SERPL CALCULATED.3IONS-SCNC: 10 MMOL/L (ref 9–16)
ANION GAP SERPL CALCULATED.3IONS-SCNC: 14 MMOL/L (ref 9–16)
AST SERPL-CCNC: 17 U/L (ref 10–50)
BASOPHILS # BLD: 0.05 K/UL (ref 0–0.2)
BASOPHILS NFR BLD: 1 % (ref 0–2)
BILIRUB SERPL-MCNC: <0.2 MG/DL (ref 0–1.2)
BNP SERPL-MCNC: ABNORMAL PG/ML (ref 0–125)
BUN SERPL-MCNC: 40 MG/DL (ref 6–20)
BUN SERPL-MCNC: 41 MG/DL (ref 6–20)
BUN/CREAT SERPL: 7 (ref 9–20)
BUN/CREAT SERPL: 7 (ref 9–20)
CALCIUM SERPL-MCNC: 8.6 MG/DL (ref 8.6–10.4)
CALCIUM SERPL-MCNC: 8.6 MG/DL (ref 8.6–10.4)
CHLORIDE SERPL-SCNC: 107 MMOL/L (ref 98–107)
CHLORIDE SERPL-SCNC: 109 MMOL/L (ref 98–107)
CO2 SERPL-SCNC: 18 MMOL/L (ref 20–31)
CO2 SERPL-SCNC: 20 MMOL/L (ref 20–31)
CREAT SERPL-MCNC: 5.5 MG/DL (ref 0.7–1.2)
CREAT SERPL-MCNC: 5.6 MG/DL (ref 0.7–1.2)
EOSINOPHIL # BLD: 0.31 K/UL (ref 0–0.44)
EOSINOPHILS RELATIVE PERCENT: 3 % (ref 1–4)
ERYTHROCYTE [DISTWIDTH] IN BLOOD BY AUTOMATED COUNT: 11.9 % (ref 11.8–14.4)
GFR, ESTIMATED: 12 ML/MIN/1.73M2
GFR, ESTIMATED: 13 ML/MIN/1.73M2
GLUCOSE SERPL-MCNC: 131 MG/DL (ref 74–99)
GLUCOSE SERPL-MCNC: 153 MG/DL (ref 74–99)
HCT VFR BLD AUTO: 30.8 % (ref 40.7–50.3)
HGB BLD-MCNC: 10.8 G/DL (ref 13–17)
IMM GRANULOCYTES # BLD AUTO: 0.04 K/UL (ref 0–0.3)
IMM GRANULOCYTES NFR BLD: 0 %
LIPASE SERPL-CCNC: 21 U/L (ref 13–60)
LYMPHOCYTES NFR BLD: 2.17 K/UL (ref 1.1–3.7)
LYMPHOCYTES RELATIVE PERCENT: 21 % (ref 24–43)
MCH RBC QN AUTO: 33.9 PG (ref 25.2–33.5)
MCHC RBC AUTO-ENTMCNC: 35.1 G/DL (ref 25.2–33.5)
MCV RBC AUTO: 96.6 FL (ref 82.6–102.9)
MONOCYTES NFR BLD: 0.55 K/UL (ref 0.1–1.2)
MONOCYTES NFR BLD: 5 % (ref 3–12)
NEUTROPHILS NFR BLD: 70 % (ref 36–65)
NEUTS SEG NFR BLD: 7.36 K/UL (ref 1.5–8.1)
NRBC BLD-RTO: 0 PER 100 WBC
PLATELET # BLD AUTO: 353 K/UL (ref 138–453)
PMV BLD AUTO: 9.1 FL (ref 8.1–13.5)
POTASSIUM SERPL-SCNC: 4.6 MMOL/L (ref 3.7–5.3)
POTASSIUM SERPL-SCNC: 5 MMOL/L (ref 3.7–5.3)
PROT SERPL-MCNC: 5.5 G/DL (ref 6.6–8.7)
RBC # BLD AUTO: 3.19 M/UL (ref 4.21–5.77)
SODIUM SERPL-SCNC: 139 MMOL/L (ref 136–145)
SODIUM SERPL-SCNC: 139 MMOL/L (ref 136–145)
TROPONIN I SERPL HS-MCNC: 145 NG/L (ref 0–22)
TROPONIN I SERPL HS-MCNC: 145 NG/L (ref 0–22)
WBC OTHER # BLD: 10.5 K/UL (ref 3.5–11.3)

## 2025-07-14 PROCEDURE — 70450 CT HEAD/BRAIN W/O DYE: CPT

## 2025-07-14 PROCEDURE — 2580000003 HC RX 258: Performed by: EMERGENCY MEDICINE

## 2025-07-14 PROCEDURE — 6360000002 HC RX W HCPCS: Performed by: EMERGENCY MEDICINE

## 2025-07-14 PROCEDURE — 93005 ELECTROCARDIOGRAM TRACING: CPT | Performed by: STUDENT IN AN ORGANIZED HEALTH CARE EDUCATION/TRAINING PROGRAM

## 2025-07-14 PROCEDURE — 85025 COMPLETE CBC W/AUTO DIFF WBC: CPT

## 2025-07-14 PROCEDURE — 83880 ASSAY OF NATRIURETIC PEPTIDE: CPT

## 2025-07-14 PROCEDURE — 36415 COLL VENOUS BLD VENIPUNCTURE: CPT

## 2025-07-14 PROCEDURE — 83690 ASSAY OF LIPASE: CPT

## 2025-07-14 PROCEDURE — 71250 CT THORAX DX C-: CPT

## 2025-07-14 PROCEDURE — 6360000002 HC RX W HCPCS: Performed by: STUDENT IN AN ORGANIZED HEALTH CARE EDUCATION/TRAINING PROGRAM

## 2025-07-14 PROCEDURE — 96365 THER/PROPH/DIAG IV INF INIT: CPT

## 2025-07-14 PROCEDURE — 96366 THER/PROPH/DIAG IV INF ADDON: CPT

## 2025-07-14 PROCEDURE — 84484 ASSAY OF TROPONIN QUANT: CPT

## 2025-07-14 PROCEDURE — 99285 EMERGENCY DEPT VISIT HI MDM: CPT

## 2025-07-14 PROCEDURE — 80053 COMPREHEN METABOLIC PANEL: CPT

## 2025-07-14 PROCEDURE — 96375 TX/PRO/DX INJ NEW DRUG ADDON: CPT

## 2025-07-14 PROCEDURE — 70490 CT SOFT TISSUE NECK W/O DYE: CPT

## 2025-07-14 PROCEDURE — 80048 BASIC METABOLIC PNL TOTAL CA: CPT

## 2025-07-14 RX ORDER — LABETALOL HYDROCHLORIDE 5 MG/ML
10 INJECTION, SOLUTION INTRAVENOUS ONCE
Status: COMPLETED | OUTPATIENT
Start: 2025-07-14 | End: 2025-07-14

## 2025-07-14 RX ADMIN — LABETALOL HYDROCHLORIDE 10 MG: 5 INJECTION INTRAVENOUS at 19:29

## 2025-07-14 RX ADMIN — SODIUM CHLORIDE 5 MG/HR: 0.9 INJECTION, SOLUTION INTRAVENOUS at 20:22

## 2025-07-14 ASSESSMENT — ENCOUNTER SYMPTOMS
SHORTNESS OF BREATH: 1
NAUSEA: 0
TROUBLE SWALLOWING: 1
VOMITING: 1
ABDOMINAL PAIN: 0
COUGH: 1

## 2025-07-14 ASSESSMENT — PAIN - FUNCTIONAL ASSESSMENT
PAIN_FUNCTIONAL_ASSESSMENT: PREVENTS OR INTERFERES WITH MANY ACTIVE NOT PASSIVE ACTIVITIES
PAIN_FUNCTIONAL_ASSESSMENT: NONE - DENIES PAIN

## 2025-07-14 ASSESSMENT — PAIN DESCRIPTION - ONSET: ONSET: GRADUAL

## 2025-07-14 ASSESSMENT — PAIN DESCRIPTION - PAIN TYPE: TYPE: ACUTE PAIN

## 2025-07-14 ASSESSMENT — LIFESTYLE VARIABLES: HOW OFTEN DO YOU HAVE A DRINK CONTAINING ALCOHOL: MONTHLY OR LESS

## 2025-07-14 ASSESSMENT — PAIN SCALES - GENERAL: PAINLEVEL_OUTOF10: 8

## 2025-07-14 ASSESSMENT — PAIN DESCRIPTION - DESCRIPTORS: DESCRIPTORS: THROBBING

## 2025-07-14 ASSESSMENT — PAIN DESCRIPTION - LOCATION: LOCATION: HEAD

## 2025-07-14 ASSESSMENT — PAIN DESCRIPTION - FREQUENCY: FREQUENCY: CONTINUOUS

## 2025-07-14 NOTE — ED PROVIDER NOTES
[facility] EMERGENCY DEPARTMENT  EMERGENCY DEPARTMENT ENCOUNTER      Pt Name: Pedro Sotelo  MRN: [id number]  Birthdate 1985  Date of evaluation: 7/14/2025  Provider: Carolyn Chamberlain DO    CHIEF COMPLAINT       Chief Complaint   Patient presents with    Pharyngitis     Foreign body sensation for a couple months, has testing scheduled for 7/25 but does not feel he can wait    Migraine     Started a couple days ago, pt gets migraines but states this one hurts more than usual.    small pus-filled areas on left upper chest       HISTORY OF PRESENT ILLNESS   (Location/Symptom, Timing/Onset,Context/Setting, Quality, Duration, Modifying Factors, Severity)  Note limiting factors.     Pedro Sotelo is a 40 y.o. male who presents to the emergency department with difficulty swallowing.  Patient states that she has been ongoing for months.  States he feels like things are getting stuck in his pain with swallowing.  Patient also states he has been vomiting up some blood clots.  Patient states sometimes he also been coughing up some blood clots.  Does feel somewhat short of breath.  No chest pain.  No history of PE or DVT, not anticoagulated.  Patient saw his primary doctor for this recently and they scheduled him some outpatient testing however that is not coming up for about a week and a half.  Patient states he felt like the foreign body sensation and swallowing difficulty is getting worse.  Patient also states he developed a very severe headache, developed it a few days ago.  Feeling the headache is getting worse.  Does have chronic migraines however this headache does feel somewhat different.  Patient also has an area on his left chest that he wants evaluated, believes he has a cyst there however it has been painful and wanted it looked at.  Patient does have a history of hypertension, takes lisinopril daily.  States his blood pressure is normally very controlled.    Nursing Notes were

## 2025-07-14 NOTE — ED PROVIDER NOTES
[facility] Emergency Department  [address] [facility] [address]  Phone: [phone]      Patient Name:  Pedro Sotelo  Medical Record Number:  [id number]  YOB: 1985  Date of Service:  7/14/2025  Primary Care Physician:  Nga Martines DO    Care of this patient was assumed from Dr Chamberlain.  The patient was seen for Pharyngitis (Foreign body sensation for a couple months, has testing scheduled for 7/25 but does not feel he can wait), Migraine (Started a couple days ago, pt gets migraines but states this one hurts more than usual.), and small pus-filled areas on left upper chest  .  The patient's initial evaluation and plan have been discussed with the prior provider who initially evaluated the patient.  Past medical history, past surgical history, medications and allergies were all reviewed.     CURRENT MEDICATIONS:      Discharge Medication List as of 7/14/2025 11:13 PM        CONTINUE these medications which have NOT CHANGED    Details   lisinopril (PRINIVIL;ZESTRIL) 5 MG tablet Take 1 tablet by mouth daily, Disp-90 tablet, R-3Normal      ondansetron (ZOFRAN-ODT) 4 MG disintegrating tablet Take 1 tablet by mouth every 8 hours as needed for Nausea or Vomiting, Disp-30 tablet, R-1Normal      omeprazole (PRILOSEC) 40 MG delayed release capsule Take 1 capsule by mouth daily, Disp-30 capsule, R-0Normal      clotrimazole (LOTRIMIN) 1 % cream APPLY TO AFFECTED AREA(S) 2 TIMES A DAY FOR 10 DAYS, THEN AS NEEDED, Disp-30 g, R-1, Normal      Insulin Disposable Pump (OMNIPOD 5 [id number] PODS GEN 5) MISC USE AS DIRECTED FOR DELIVERING INSULIN. CHANGE POD EVERY THREE DAYS, Disp-10 each, R-5Normal      simvastatin (ZOCOR) 20 MG tablet Take 1 tablet by mouth every evening, Disp-90 tablet, R-3Normal      Continuous Glucose Sensor (DEXCOM G7 SENSOR) MISC 3 each by Does not apply route every 10 days, Disp-3 each, R-5Normal      EPINEPHrine (EPIPEN 2-KARY) 0.3 MG/0.3ML SOAJ injection Use as directed for

## 2025-07-15 ENCOUNTER — HOSPITAL ENCOUNTER (INPATIENT)
Age: 40
LOS: 9 days | Discharge: HOME | DRG: 280 | End: 2025-07-24
Attending: STUDENT IN AN ORGANIZED HEALTH CARE EDUCATION/TRAINING PROGRAM | Admitting: RADIOLOGY
Payer: COMMERCIAL

## 2025-07-15 ENCOUNTER — APPOINTMENT (OUTPATIENT)
Dept: ULTRASOUND IMAGING | Age: 40
DRG: 280 | End: 2025-07-15
Attending: STUDENT IN AN ORGANIZED HEALTH CARE EDUCATION/TRAINING PROGRAM
Payer: COMMERCIAL

## 2025-07-15 ENCOUNTER — APPOINTMENT (OUTPATIENT)
Age: 40
DRG: 280 | End: 2025-07-15
Payer: COMMERCIAL

## 2025-07-15 DIAGNOSIS — I45.5 SINUS PAUSE: ICD-10-CM

## 2025-07-15 DIAGNOSIS — I10 PRIMARY HYPERTENSION: Primary | ICD-10-CM

## 2025-07-15 DIAGNOSIS — R13.10 ODYNOPHAGIA: ICD-10-CM

## 2025-07-15 DIAGNOSIS — R55 SYNCOPE, UNSPECIFIED SYNCOPE TYPE: ICD-10-CM

## 2025-07-15 PROBLEM — R79.89 ELEVATED BRAIN NATRIURETIC PEPTIDE (BNP) LEVEL: Status: ACTIVE | Noted: 2025-07-15

## 2025-07-15 PROBLEM — N17.9 AKI (ACUTE KIDNEY INJURY): Status: ACTIVE | Noted: 2025-07-15

## 2025-07-15 PROBLEM — R79.89 ELEVATED TROPONIN: Status: ACTIVE | Noted: 2025-07-15

## 2025-07-15 PROBLEM — E11.21 DIABETIC NEPHROPATHY WITH PROTEINURIA (HCC): Status: ACTIVE | Noted: 2025-07-15

## 2025-07-15 PROBLEM — I16.1 HYPERTENSIVE EMERGENCY: Status: ACTIVE | Noted: 2025-07-14

## 2025-07-15 PROBLEM — N18.31 CHRONIC KIDNEY DISEASE, STAGE 3A (HCC): Status: ACTIVE | Noted: 2025-07-15

## 2025-07-15 PROBLEM — E87.20 METABOLIC ACIDOSIS: Status: ACTIVE | Noted: 2025-07-15

## 2025-07-15 LAB
ALBUMIN SERPL BCG-MCNC: 2.4 G/DL (ref 3.4–4.9)
ALP SERPL-CCNC: 94 U/L (ref 40–129)
ALT SERPL W/O P-5'-P-CCNC: 8 U/L (ref 10–50)
AMPHETAMINES UR QL SCN: NEGATIVE
ANION GAP SERPL CALC-SCNC: 13 MEQ/L (ref 8–16)
AST SERPL-CCNC: 18 U/L (ref 10–50)
BACTERIA: ABNORMAL
BARBITURATES UR QL SCN: NEGATIVE
BASOPHILS ABSOLUTE: 0.1 THOU/MM3 (ref 0–0.1)
BASOPHILS NFR BLD AUTO: 0.5 %
BENZODIAZ UR QL SCN: NEGATIVE
BILIRUB SERPL-MCNC: < 0.2 MG/DL (ref 0.3–1.2)
BILIRUB UR QL STRIP: NEGATIVE
BUN SERPL-MCNC: 43 MG/DL (ref 8–23)
BUPRENORPHINE URINE: NEGATIVE
BZE UR QL SCN: NEGATIVE
C3C SERPL-MCNC: 149 MG/DL (ref 90–180)
C4 SERPL-MCNC: 25 MG/DL (ref 10–40)
CALCIUM SERPL-MCNC: 8.4 MG/DL (ref 8.6–10)
CANNABINOIDS UR QL SCN: NEGATIVE
CASTS #/AREA URNS LPF: ABNORMAL /LPF
CASTS #/AREA URNS LPF: ABNORMAL /LPF
CHARACTER UR: ABNORMAL
CHARCOAL URNS QL MICRO: ABNORMAL
CHLORIDE SERPL-SCNC: 107 MEQ/L (ref 98–111)
CK SERPL-CCNC: 110 U/L (ref 39–308)
CO2 SERPL-SCNC: 16 MEQ/L (ref 22–29)
COLOR UR: YELLOW
CREAT SERPL-MCNC: 5.9 MG/DL (ref 0.7–1.2)
CREAT UR-MCNC: 134 MG/DL
CRYSTALS URNS QL MICRO: ABNORMAL
DEPRECATED RDW RBC AUTO: 43.5 FL (ref 35–45)
ECHO AO ASC DIAM: 3 CM
ECHO AO ASCENDING AORTA INDEX: 1.39 CM/M2
ECHO AV CUSP MM: 2 CM
ECHO AV PEAK GRADIENT: 9 MMHG
ECHO AV PEAK VELOCITY: 1.5 M/S
ECHO AV VELOCITY RATIO: 0.67
ECHO BSA: 2.21 M2
ECHO EST RA PRESSURE: 3 MMHG
ECHO LA AREA 2C: 17.6 CM2
ECHO LA AREA 4C: 15.8 CM2
ECHO LA DIAMETER INDEX: 1.44 CM/M2
ECHO LA DIAMETER: 3.1 CM
ECHO LA MAJOR AXIS: 4.5 CM
ECHO LA MINOR AXIS: 5.4 CM
ECHO LA VOL BP: 50 ML (ref 18–58)
ECHO LA VOL MOD A2C: 47 ML (ref 18–58)
ECHO LA VOL MOD A4C: 45 ML (ref 18–58)
ECHO LA VOL/BSA BIPLANE: 23 ML/M2 (ref 16–34)
ECHO LA VOLUME INDEX MOD A2C: 22 ML/M2 (ref 16–34)
ECHO LA VOLUME INDEX MOD A4C: 21 ML/M2 (ref 16–34)
ECHO LV E' LATERAL VELOCITY: 8.5 CM/S
ECHO LV E' SEPTAL VELOCITY: 8.5 CM/S
ECHO LV EJECTION FRACTION 3D: 55 %
ECHO LV FRACTIONAL SHORTENING: 35 % (ref 28–44)
ECHO LV INTERNAL DIMENSION DIASTOLE INDEX: 2.5 CM/M2
ECHO LV INTERNAL DIMENSION DIASTOLIC: 5.4 CM (ref 4.2–5.9)
ECHO LV INTERNAL DIMENSION SYSTOLIC INDEX: 1.62 CM/M2
ECHO LV INTERNAL DIMENSION SYSTOLIC: 3.5 CM
ECHO LV ISOVOLUMETRIC RELAXATION TIME (IVRT): 46 MS
ECHO LV IVSD: 1 CM (ref 0.6–1)
ECHO LV MASS 2D: 220.6 G (ref 88–224)
ECHO LV MASS INDEX 2D: 102.1 G/M2 (ref 49–115)
ECHO LV POSTERIOR WALL DIASTOLIC: 1.1 CM (ref 0.6–1)
ECHO LV RELATIVE WALL THICKNESS RATIO: 0.41
ECHO LVOT PEAK GRADIENT: 4 MMHG
ECHO LVOT PEAK VELOCITY: 1 M/S
ECHO MV A VELOCITY: 1.05 M/S
ECHO MV E DECELERATION TIME (DT): 303 MS
ECHO MV E VELOCITY: 0.93 M/S
ECHO MV E/A RATIO: 0.89
ECHO MV E/E' LATERAL: 10.94
ECHO MV E/E' RATIO (AVERAGED): 10.94
ECHO MV E/E' SEPTAL: 10.94
ECHO PV MAX VELOCITY: 1 M/S
ECHO PV PEAK GRADIENT: 4 MMHG
ECHO RIGHT VENTRICULAR SYSTOLIC PRESSURE (RVSP): 28 MMHG
ECHO RV INTERNAL DIMENSION: 2.4 CM
ECHO RV TAPSE: 2.4 CM (ref 1.7–?)
ECHO TV E WAVE: 0.4 M/S
ECHO TV REGURGITANT MAX VELOCITY: 2.51 M/S
ECHO TV REGURGITANT PEAK GRADIENT: 25 MMHG
EKG ATRIAL RATE: 97 BPM
EKG P AXIS: 40 DEGREES
EKG P-R INTERVAL: 114 MS
EKG Q-T INTERVAL: 370 MS
EKG QRS DURATION: 98 MS
EKG QTC CALCULATION (BAZETT): 469 MS
EKG R AXIS: 61 DEGREES
EKG T AXIS: 79 DEGREES
EKG VENTRICULAR RATE: 97 BPM
EOSINOPHIL NFR BLD AUTO: 1.3 %
EOSINOPHILS ABSOLUTE: 0.1 THOU/MM3 (ref 0–0.4)
EPITHELIAL CELLS, UA: ABNORMAL /HPF
ERYTHROCYTE [DISTWIDTH] IN BLOOD BY AUTOMATED COUNT: 11.9 % (ref 11.5–14.5)
FENTANYL: POSITIVE
GFR SERPL CREATININE-BSD FRML MDRD: 12 ML/MIN/1.73M2
GLUCOSE BLD STRIP.AUTO-MCNC: 131 MG/DL (ref 70–108)
GLUCOSE BLD STRIP.AUTO-MCNC: 190 MG/DL (ref 70–108)
GLUCOSE BLD STRIP.AUTO-MCNC: 198 MG/DL (ref 70–108)
GLUCOSE SERPL-MCNC: 169 MG/DL (ref 74–109)
GLUCOSE UR QL STRIP.AUTO: 250 MG/DL
HBV SURFACE AB TITR SER: 5.4 MIU/ML
HBV SURFACE AG SERPL QL IA: NONREACTIVE
HCT VFR BLD AUTO: 32.6 % (ref 42–52)
HCV IGG SERPL QL IA: NONREACTIVE
HGB BLD-MCNC: 10.9 GM/DL (ref 14–18)
HGB UR QL STRIP.AUTO: ABNORMAL
IMM GRANULOCYTES # BLD AUTO: 0.04 THOU/MM3 (ref 0–0.07)
IMM GRANULOCYTES NFR BLD AUTO: 0.4 %
KETONES UR QL STRIP.AUTO: ABNORMAL
LEUKOCYTE ESTERASE UR QL STRIP.AUTO: NEGATIVE
LYMPHOCYTES ABSOLUTE: 1.5 THOU/MM3 (ref 1–4.8)
LYMPHOCYTES NFR BLD AUTO: 15.3 %
MCH RBC QN AUTO: 32.9 PG (ref 26–33)
MCHC RBC AUTO-ENTMCNC: 33.4 GM/DL (ref 32.2–35.5)
MCV RBC AUTO: 98.5 FL (ref 80–94)
MONOCYTES ABSOLUTE: 0.4 THOU/MM3 (ref 0.4–1.3)
MONOCYTES NFR BLD AUTO: 4.4 %
NEUTROPHILS ABSOLUTE: 7.8 THOU/MM3 (ref 1.8–7.7)
NEUTROPHILS NFR BLD AUTO: 78.1 %
NITRITE UR QL STRIP.AUTO: NEGATIVE
NRBC BLD AUTO-RTO: 0 /100 WBC
NT-PROBNP SERPL IA-MCNC: ABNORMAL PG/ML (ref 0–124)
OPIATES UR QL SCN: NEGATIVE
OXYCODONE: NEGATIVE
PCP UR QL SCN: NEGATIVE
PH UR STRIP.AUTO: 6.5 [PH] (ref 5–9)
PLATELET # BLD AUTO: 319 THOU/MM3 (ref 130–400)
PMV BLD AUTO: 9.1 FL (ref 9.4–12.4)
POTASSIUM SERPL-SCNC: 5.2 MEQ/L (ref 3.5–5.2)
PROT SERPL-MCNC: 5.1 G/DL (ref 6.4–8.3)
PROT UR STRIP.AUTO-MCNC: >= 300 MG/DL
PROT UR-MCNC: > 600 MG/DL
PROT/CREAT 24H UR: 4.48 MG/G{CREAT}
RBC # BLD AUTO: 3.31 MILL/MM3 (ref 4.7–6.1)
RBC #/AREA URNS HPF: ABNORMAL /HPF
RENAL EPI CELLS #/AREA URNS HPF: ABNORMAL /[HPF]
SODIUM SERPL-SCNC: 136 MEQ/L (ref 135–145)
SP GR UR REFRACT.AUTO: 1.02 (ref 1–1.03)
UROBILINOGEN UR QL STRIP.AUTO: 0.2 EU/DL (ref 0–1)
WBC # BLD AUTO: 10 THOU/MM3 (ref 4.8–10.8)
WBC #/AREA URNS HPF: ABNORMAL /HPF
YEAST LIKE FUNGI URNS QL MICRO: ABNORMAL

## 2025-07-15 PROCEDURE — 86334 IMMUNOFIX E-PHORESIS SERUM: CPT

## 2025-07-15 PROCEDURE — 87340 HEPATITIS B SURFACE AG IA: CPT

## 2025-07-15 PROCEDURE — 84156 ASSAY OF PROTEIN URINE: CPT

## 2025-07-15 PROCEDURE — 86255 FLUORESCENT ANTIBODY SCREEN: CPT

## 2025-07-15 PROCEDURE — 6360000002 HC RX W HCPCS

## 2025-07-15 PROCEDURE — 93005 ELECTROCARDIOGRAM TRACING: CPT

## 2025-07-15 PROCEDURE — 80307 DRUG TEST PRSMV CHEM ANLYZR: CPT

## 2025-07-15 PROCEDURE — 2580000003 HC RX 258: Performed by: INTERNAL MEDICINE

## 2025-07-15 PROCEDURE — 86706 HEP B SURFACE ANTIBODY: CPT

## 2025-07-15 PROCEDURE — 82570 ASSAY OF URINE CREATININE: CPT

## 2025-07-15 PROCEDURE — 2500000003 HC RX 250 WO HCPCS

## 2025-07-15 PROCEDURE — 81001 URINALYSIS AUTO W/SCOPE: CPT

## 2025-07-15 PROCEDURE — 85025 COMPLETE CBC W/AUTO DIFF WBC: CPT

## 2025-07-15 PROCEDURE — 76770 US EXAM ABDO BACK WALL COMP: CPT

## 2025-07-15 PROCEDURE — 93306 TTE W/DOPPLER COMPLETE: CPT | Performed by: NUCLEAR MEDICINE

## 2025-07-15 PROCEDURE — 99223 1ST HOSP IP/OBS HIGH 75: CPT | Performed by: INTERNAL MEDICINE

## 2025-07-15 PROCEDURE — 80053 COMPREHEN METABOLIC PANEL: CPT

## 2025-07-15 PROCEDURE — 92610 EVALUATE SWALLOWING FUNCTION: CPT

## 2025-07-15 PROCEDURE — 86160 COMPLEMENT ANTIGEN: CPT

## 2025-07-15 PROCEDURE — 2500000003 HC RX 250 WO HCPCS: Performed by: INTERNAL MEDICINE

## 2025-07-15 PROCEDURE — 84155 ASSAY OF PROTEIN SERUM: CPT

## 2025-07-15 PROCEDURE — 82948 REAGENT STRIP/BLOOD GLUCOSE: CPT

## 2025-07-15 PROCEDURE — 2060000000 HC ICU INTERMEDIATE R&B

## 2025-07-15 PROCEDURE — 86225 DNA ANTIBODY NATIVE: CPT

## 2025-07-15 PROCEDURE — 36415 COLL VENOUS BLD VENIPUNCTURE: CPT

## 2025-07-15 PROCEDURE — 6370000000 HC RX 637 (ALT 250 FOR IP)

## 2025-07-15 PROCEDURE — 83880 ASSAY OF NATRIURETIC PEPTIDE: CPT

## 2025-07-15 PROCEDURE — 82550 ASSAY OF CK (CPK): CPT

## 2025-07-15 PROCEDURE — 99223 1ST HOSP IP/OBS HIGH 75: CPT

## 2025-07-15 PROCEDURE — 93010 ELECTROCARDIOGRAM REPORT: CPT | Performed by: NUCLEAR MEDICINE

## 2025-07-15 PROCEDURE — 6370000000 HC RX 637 (ALT 250 FOR IP): Performed by: PHYSICIAN ASSISTANT

## 2025-07-15 PROCEDURE — 84165 PROTEIN E-PHORESIS SERUM: CPT

## 2025-07-15 PROCEDURE — 86038 ANTINUCLEAR ANTIBODIES: CPT

## 2025-07-15 PROCEDURE — 93306 TTE W/DOPPLER COMPLETE: CPT

## 2025-07-15 PROCEDURE — 6370000000 HC RX 637 (ALT 250 FOR IP): Performed by: INTERNAL MEDICINE

## 2025-07-15 PROCEDURE — 2580000003 HC RX 258

## 2025-07-15 PROCEDURE — 86803 HEPATITIS C AB TEST: CPT

## 2025-07-15 PROCEDURE — 83516 IMMUNOASSAY NONANTIBODY: CPT

## 2025-07-15 RX ORDER — SODIUM CHLORIDE 0.9 % (FLUSH) 0.9 %
5-40 SYRINGE (ML) INJECTION EVERY 12 HOURS SCHEDULED
Status: DISCONTINUED | OUTPATIENT
Start: 2025-07-15 | End: 2025-07-24 | Stop reason: HOSPADM

## 2025-07-15 RX ORDER — ONDANSETRON 2 MG/ML
4 INJECTION INTRAMUSCULAR; INTRAVENOUS EVERY 6 HOURS PRN
Status: DISCONTINUED | OUTPATIENT
Start: 2025-07-15 | End: 2025-07-24 | Stop reason: HOSPADM

## 2025-07-15 RX ORDER — ONDANSETRON 4 MG/1
4 TABLET, ORALLY DISINTEGRATING ORAL EVERY 8 HOURS PRN
Status: DISCONTINUED | OUTPATIENT
Start: 2025-07-15 | End: 2025-07-24 | Stop reason: HOSPADM

## 2025-07-15 RX ORDER — DEXTROSE MONOHYDRATE 100 MG/ML
INJECTION, SOLUTION INTRAVENOUS CONTINUOUS PRN
Status: DISCONTINUED | OUTPATIENT
Start: 2025-07-15 | End: 2025-07-15 | Stop reason: SDUPTHER

## 2025-07-15 RX ORDER — SODIUM CHLORIDE 0.9 % (FLUSH) 0.9 %
5-40 SYRINGE (ML) INJECTION PRN
Status: DISCONTINUED | OUTPATIENT
Start: 2025-07-15 | End: 2025-07-24 | Stop reason: HOSPADM

## 2025-07-15 RX ORDER — ACETAMINOPHEN 650 MG/1
650 SUPPOSITORY RECTAL EVERY 6 HOURS PRN
Status: DISCONTINUED | OUTPATIENT
Start: 2025-07-15 | End: 2025-07-24 | Stop reason: HOSPADM

## 2025-07-15 RX ORDER — CARVEDILOL 6.25 MG/1
6.25 TABLET ORAL 2 TIMES DAILY WITH MEALS
Status: DISCONTINUED | OUTPATIENT
Start: 2025-07-15 | End: 2025-07-16

## 2025-07-15 RX ORDER — INSULIN LISPRO 100 [IU]/ML
0-8 INJECTION, SOLUTION INTRAVENOUS; SUBCUTANEOUS
Status: DISCONTINUED | OUTPATIENT
Start: 2025-07-15 | End: 2025-07-24 | Stop reason: HOSPADM

## 2025-07-15 RX ORDER — SODIUM CHLORIDE 9 MG/ML
INJECTION, SOLUTION INTRAVENOUS PRN
Status: DISCONTINUED | OUTPATIENT
Start: 2025-07-15 | End: 2025-07-24 | Stop reason: HOSPADM

## 2025-07-15 RX ORDER — OXYCODONE HYDROCHLORIDE 5 MG/1
5 TABLET ORAL ONCE
Refills: 0 | Status: COMPLETED | OUTPATIENT
Start: 2025-07-15 | End: 2025-07-15

## 2025-07-15 RX ORDER — GLUCAGON 1 MG/ML
1 KIT INJECTION PRN
Status: DISCONTINUED | OUTPATIENT
Start: 2025-07-15 | End: 2025-07-24 | Stop reason: HOSPADM

## 2025-07-15 RX ORDER — ACETAMINOPHEN 325 MG/1
650 TABLET ORAL EVERY 6 HOURS PRN
Status: DISCONTINUED | OUTPATIENT
Start: 2025-07-15 | End: 2025-07-24 | Stop reason: HOSPADM

## 2025-07-15 RX ORDER — SODIUM CHLORIDE 9 MG/ML
INJECTION, SOLUTION INTRAVENOUS CONTINUOUS
Status: DISCONTINUED | OUTPATIENT
Start: 2025-07-15 | End: 2025-07-15

## 2025-07-15 RX ORDER — DEXTROSE MONOHYDRATE 100 MG/ML
INJECTION, SOLUTION INTRAVENOUS CONTINUOUS PRN
Status: DISCONTINUED | OUTPATIENT
Start: 2025-07-15 | End: 2025-07-24 | Stop reason: HOSPADM

## 2025-07-15 RX ORDER — POLYETHYLENE GLYCOL 3350 17 G/17G
17 POWDER, FOR SOLUTION ORAL DAILY PRN
Status: DISCONTINUED | OUTPATIENT
Start: 2025-07-15 | End: 2025-07-24 | Stop reason: HOSPADM

## 2025-07-15 RX ORDER — GLUCAGON 1 MG/ML
1 KIT INJECTION PRN
Status: DISCONTINUED | OUTPATIENT
Start: 2025-07-15 | End: 2025-07-15 | Stop reason: SDUPTHER

## 2025-07-15 RX ORDER — ENOXAPARIN SODIUM 100 MG/ML
30 INJECTION SUBCUTANEOUS DAILY
Status: DISCONTINUED | OUTPATIENT
Start: 2025-07-15 | End: 2025-07-20

## 2025-07-15 RX ADMIN — ACETAMINOPHEN 650 MG: 325 TABLET ORAL at 20:32

## 2025-07-15 RX ADMIN — ACETAMINOPHEN 650 MG: 325 TABLET ORAL at 08:15

## 2025-07-15 RX ADMIN — ENOXAPARIN SODIUM 30 MG: 100 INJECTION SUBCUTANEOUS at 08:15

## 2025-07-15 RX ADMIN — OXYCODONE 5 MG: 5 TABLET ORAL at 03:32

## 2025-07-15 RX ADMIN — SODIUM CHLORIDE, PRESERVATIVE FREE 10 ML: 5 INJECTION INTRAVENOUS at 08:16

## 2025-07-15 RX ADMIN — CARVEDILOL 6.25 MG: 6.25 TABLET, FILM COATED ORAL at 15:49

## 2025-07-15 RX ADMIN — INSULIN LISPRO 2 UNITS: 100 INJECTION, SOLUTION INTRAVENOUS; SUBCUTANEOUS at 11:35

## 2025-07-15 RX ADMIN — SODIUM CHLORIDE, PRESERVATIVE FREE 10 ML: 5 INJECTION INTRAVENOUS at 20:27

## 2025-07-15 RX ADMIN — NICARDIPINE HYDROCHLORIDE 5 MG/HR: 25 INJECTION INTRAVENOUS at 07:56

## 2025-07-15 RX ADMIN — INSULIN LISPRO 2 UNITS: 100 INJECTION, SOLUTION INTRAVENOUS; SUBCUTANEOUS at 17:33

## 2025-07-15 RX ADMIN — SODIUM CHLORIDE: 0.9 INJECTION, SOLUTION INTRAVENOUS at 09:54

## 2025-07-15 RX ADMIN — ACETAMINOPHEN 650 MG: 325 TABLET ORAL at 01:25

## 2025-07-15 RX ADMIN — SODIUM BICARBONATE: 84 INJECTION, SOLUTION INTRAVENOUS at 13:00

## 2025-07-15 RX ADMIN — NICARDIPINE HYDROCHLORIDE 7.5 MG/HR: 25 INJECTION INTRAVENOUS at 11:38

## 2025-07-15 RX ADMIN — NICARDIPINE HYDROCHLORIDE 5 MG/HR: 25 INJECTION INTRAVENOUS at 03:49

## 2025-07-15 RX ADMIN — OXYCODONE 5 MG: 5 TABLET ORAL at 20:39

## 2025-07-15 ASSESSMENT — PAIN SCALES - GENERAL
PAINLEVEL_OUTOF10: 7
PAINLEVEL_OUTOF10: 4
PAINLEVEL_OUTOF10: 0
PAINLEVEL_OUTOF10: 0
PAINLEVEL_OUTOF10: 6
PAINLEVEL_OUTOF10: 10
PAINLEVEL_OUTOF10: 5
PAINLEVEL_OUTOF10: 7
PAINLEVEL_OUTOF10: 4
PAINLEVEL_OUTOF10: 6
PAINLEVEL_OUTOF10: 4
PAINLEVEL_OUTOF10: 7

## 2025-07-15 ASSESSMENT — PAIN DESCRIPTION - ORIENTATION
ORIENTATION: MID
ORIENTATION: LEFT;RIGHT
ORIENTATION: RIGHT;LEFT
ORIENTATION: MID
ORIENTATION: RIGHT;LEFT

## 2025-07-15 ASSESSMENT — PAIN DESCRIPTION - LOCATION
LOCATION: HEAD
LOCATION: HEAD
LOCATION: THROAT
LOCATION: HEAD

## 2025-07-15 ASSESSMENT — PAIN DESCRIPTION - DESCRIPTORS
DESCRIPTORS: ACHING;DISCOMFORT;DULL
DESCRIPTORS: ACHING;THROBBING
DESCRIPTORS: POUNDING;SHOOTING
DESCRIPTORS: ACHING;THROBBING

## 2025-07-15 ASSESSMENT — PAIN - FUNCTIONAL ASSESSMENT
PAIN_FUNCTIONAL_ASSESSMENT: ACTIVITIES ARE NOT PREVENTED
PAIN_FUNCTIONAL_ASSESSMENT: ACTIVITIES ARE NOT PREVENTED
PAIN_FUNCTIONAL_ASSESSMENT: PREVENTS OR INTERFERES WITH MANY ACTIVE NOT PASSIVE ACTIVITIES
PAIN_FUNCTIONAL_ASSESSMENT: ACTIVITIES ARE NOT PREVENTED

## 2025-07-15 ASSESSMENT — PAIN DESCRIPTION - PAIN TYPE
TYPE: ACUTE PAIN

## 2025-07-15 ASSESSMENT — PAIN DESCRIPTION - FREQUENCY: FREQUENCY: CONTINUOUS

## 2025-07-15 ASSESSMENT — PAIN DESCRIPTION - ONSET: ONSET: ON-GOING

## 2025-07-15 NOTE — H&P
Hospitalist History & Physical    Patient:  Pedro Sotelo    Unit/Bed:4B-05/005-A  YOB: 1985  MRN: [id number]   Acct: [id number]   PCP: Nga Martines DO  Code Status: Full Code    Date of Service: Pt seen/examined on 07/15/25 and admitted to Inpatient with expected LOS greater than two midnights due to medical therapy.     Chief Complaint: Headache    Assessment/Plan:    Hypertensive emergency: Reports compliance with his Lisinopril.  Acutely hypertensive with .  Reports his blood pressure was 120/80 at PCP visit on 7/13/25.  Associated severe headache. CT head (-).   Creatinine elevated at 5.9.  BNP 95176 and troponin 145 with flat trend.    Continue Cardene with SBP < 180 for first 24 hours then will bring to normotensive.   Renal ultrasound.   TTE.      Acute kidney injury: Reports no history of renal disease however, on chart review, creatinine was 1.6 (12/2024) and 1.5 (1/2025).  Current creatinine 5.9.   Consult nephrology.   Renal ultrasound.   Check urine studies.   Hold Lisinopril.   BMP daily.   Renally dose medication.     Elevated BNP/Troponin: Likely secondary to hypertensive emergency. BNP 63196 and troponin flat at 145-145. CT chest shows very small left pleural effusion and pulmonary interstitial edema.    TTE in AM.      Dysphagia/Hematemesis: Ongoing for several months with associated nausea and intermittent blood tinged emesis. Reports sensation of something stuck in throat.  Worked up outpatient.  Scheduled for EGD on 7/25/25.  Hemoglobin 10.9 on admission  Monitor  Consider inpatient GI consultation if symptoms worsen or worsening anemia.      Type 1 Diabetes Mellitus: Controlled. Last A1c 6.2 (7/9/25).   Continue home regimen-Ok to use home insulin pump.    Hypoglycemia protocol.   Carb control diet.     HLD:  Continue home Statin.     GERD:   Continue Protonix.        LDA: []CVC / []PICC / []Midline / []Rodriguez / []Drains / []Mediport / [x]None  Antibiotics:

## 2025-07-15 NOTE — PLAN OF CARE
Problem: Pain  Goal: Verbalizes/displays adequate comfort level or baseline comfort level  7/15/2025 1015 by Ct Wells RN  Outcome: Progressing  Flowsheets (Taken 7/15/2025 0800)  Verbalizes/displays adequate comfort level or baseline comfort level:   Encourage patient to monitor pain and request assistance   Assess pain using appropriate pain scale   Administer analgesics based on type and severity of pain and evaluate response  Problem: Safety - Adult  Goal: Free from fall injury  Flowsheets (Taken 7/15/2025 1016)  Free From Fall Injury: Instruct family/caregiver on patient safety

## 2025-07-15 NOTE — PLAN OF CARE
Patient admitted overnight. Visited on 4B stepdown unit. Message received from nephrology regarding lack of formal consult order placed.  Order was placed in epic.  Patient continues on Cardene drip.  Blood pressure improved.  Patient reportedly with difficulty swallowing over the last several months.  Speech-language pathology evaluation ordered.  They plan to proceed with modified barium swallow.  Will avoid resuming oral lisinopril at this time given renal insufficiency.  Coreg added by nephrology.  Continue to monitor.    Electronically signed by Dank Llamas PA-C on 7/15/2025 at 2:16 PM

## 2025-07-15 NOTE — CARE COORDINATION
Case Management Assessment Initial Evaluation    Date/Time of Evaluation: 7/15/2025 8:18 AM  Assessment Completed by: Esha Torres RN    If patient is discharged prior to next notation, then this note serves as note for discharge by case management.    Patient Name: Pedro Sotelo                   YOB: 1985  Diagnosis: Hypertensive crisis [I16.9]                   Date / Time: 7/15/2025 12:27 AM  Location: [address] [place] [address]     Patient Admission Status: Inpatient   Readmission Risk Low 0-14, Mod 15-19), High > 20: Readmission Risk Score: 14.5    Current PCP: Nga Martines DO  Health Care Decision Makers: NOK    Additional Case Management Notes: Presented to [facility] ED with c/o difficulty swallowing for several months, felt like something caught in throat, having n/v with bloody streaks, and headache for a couple days. Reports saw PCP and was scheduled for EGD on 25, but couldn't wait any longer.  in ED; started on cardene drip. WARNER with Creat 5.9. Transferred and admitted to [facility]. Echo ordered. Only had 50 ml UO on night shift. Nephrology consulted for WARNER, HTN.     Afebrile. NSR-'s. On room air. Ox4. SLP. Telemetry, neuro checks. Cardene @ 7.5 mg/hr, bicarb drip, lovenox, SSI. BUN 40 - now 43, Creat 5.5 - now 5.9, trop 145, NT pro-bnp 88963 - now 26004, alb 2.6 -now 2.4, total pro 5.5 -now 5.1, hgb 10.8 - now 10.9.     Procedures: none    Imagin/14 CT Head: No acute findings     CT Soft tissue neck:   1. No appreciable acute abnormality of the neck soft tissues.  2. Mild interlobular septal thickening and ground-glass attenuation in the  imaged lung apices, which may represent mild pulmonary edema.  3. Hyperdense material within the left globe with posterior calcifications,  which may be related to prior surgery or trauma.     CT Chest/Abd/Pelvis:   1. Limited evaluation due to absence of IV contrast.  2. Very small left pleural effusion.  3.

## 2025-07-15 NOTE — PLAN OF CARE
Problem: Chronic Conditions and Co-morbidities  Goal: Patient's chronic conditions and co-morbidity symptoms are monitored and maintained or improved  Outcome: Progressing  Flowsheets (Taken 7/15/2025 0120)  Care Plan - Patient's Chronic Conditions and Co-Morbidity Symptoms are Monitored and Maintained or Improved:   Monitor and assess patient's chronic conditions and comorbid symptoms for stability, deterioration, or improvement   Collaborate with multidisciplinary team to address chronic and comorbid conditions and prevent exacerbation or deterioration   Update acute care plan with appropriate goals if chronic or comorbid symptoms are exacerbated and prevent overall improvement and discharge     Problem: Discharge Planning  Goal: Discharge to home or other facility with appropriate resources  Outcome: Progressing  Flowsheets  Taken 7/15/2025 0150  Discharge to home or other facility with appropriate resources:   Identify barriers to discharge with patient and caregiver   Identify discharge learning needs (meds, wound care, etc)   Arrange for needed discharge resources and transportation as appropriate  Taken 7/15/2025 0120  Discharge to home or other facility with appropriate resources:   Identify barriers to discharge with patient and caregiver   Arrange for needed discharge resources and transportation as appropriate   Identify discharge learning needs (meds, wound care, etc)     Problem: Pain  Goal: Verbalizes/displays adequate comfort level or baseline comfort level  Outcome: Progressing  Flowsheets (Taken 7/15/2025 0204)  Verbalizes/displays adequate comfort level or baseline comfort level:   Encourage patient to monitor pain and request assistance   Assess pain using appropriate pain scale   Administer analgesics based on type and severity of pain and evaluate response   Implement non-pharmacological measures as appropriate and evaluate response   Consider cultural and social influences on pain and pain

## 2025-07-15 NOTE — CONSULTS
[facility]    [address], [place] [address]  [phone]     Hospital Consult  7/15/2025 9:55 AM    Pt Name:    Pedro Sotelo  MRN:     [id number]   [id number]  YOB: 1985  Admit Date:    7/15/2025 12:27 AM  Primary Care Physician:  Nga Martines DO    [facility] Number:   [id number]    Reason for Consult:  WARNER/CKD and HTN  Requesting provider:  Hospitalist    History:   The patient is a 40 y.o. male with history of CKD stage III with previous creatinine of 1.5 as of January 2025, type 1 diabetes mellitus-independent with associated retinopathy and peripheral neuropathy, hypertension who is from [facility] and follows with his providers locally in [place].  Reports he has never seen a nephrologist.  Follows with endocrinology reportedly.  Has underlying type 1 diabetes mellitus.  A1c's have been very high in the past.  Back in 2018 A1c was greater than 14%.  2022 A1c was down to 12.7%.  Last year A1c was 7.5% in December 2024.  Most recent A1c is down to 6.2%.  Patient does have nephrotic range proteinuria.  He was admitted in transfer from outside hospital where he had presented with complaints of elevated blood pressure, difficulty swallowing associated with some nausea and vomiting.  Reports he has had this problem for last several weeks and recently saw PCP and was scheduled for EGD next week but he felt he could not wait.  Reports headaches.  In the emergency room he was noted to have elevated blood pressure.  He was started on Cardene drip.  [place] [facility] attempted to send him to [facility] in [place] but no beds were available.  He was then transferred to [facility] for further evaluation.  Nephrology consulted.  Patient seen and examined.  Currently on room air.  Family member at the bedside.  He denies any urinary complaints.  No lower extremity swelling.  No chest pain or any shortness of breath.  Denies any nonsteroidal use.    Past

## 2025-07-15 NOTE — PROCEDURES
PROCEDURE NOTE  Date: 7/15/2025   Name: Pedro Sotelo  YOB: 1985    Procedures  12 lead EKG completed. Results handed to Alysa RODRIGUEZ.

## 2025-07-16 ENCOUNTER — APPOINTMENT (OUTPATIENT)
Dept: GENERAL RADIOLOGY | Age: 40
DRG: 280 | End: 2025-07-16
Attending: STUDENT IN AN ORGANIZED HEALTH CARE EDUCATION/TRAINING PROGRAM
Payer: COMMERCIAL

## 2025-07-16 LAB
ANION GAP SERPL CALC-SCNC: 10 MEQ/L (ref 8–16)
BUN SERPL-MCNC: 47 MG/DL (ref 8–23)
CALCIUM SERPL-MCNC: 7.9 MG/DL (ref 8.6–10)
CHLORIDE SERPL-SCNC: 108 MEQ/L (ref 98–111)
CO2 SERPL-SCNC: 20 MEQ/L (ref 22–29)
CREAT SERPL-MCNC: 6.2 MG/DL (ref 0.7–1.2)
DEPRECATED RDW RBC AUTO: 42.3 FL (ref 35–45)
EKG ATRIAL RATE: 85 BPM
EKG ATRIAL RATE: 90 BPM
EKG P AXIS: 41 DEGREES
EKG P AXIS: 52 DEGREES
EKG P-R INTERVAL: 114 MS
EKG P-R INTERVAL: 114 MS
EKG Q-T INTERVAL: 364 MS
EKG Q-T INTERVAL: 388 MS
EKG QRS DURATION: 102 MS
EKG QRS DURATION: 92 MS
EKG QTC CALCULATION (BAZETT): 445 MS
EKG QTC CALCULATION (BAZETT): 461 MS
EKG R AXIS: 54 DEGREES
EKG R AXIS: 61 DEGREES
EKG T AXIS: 65 DEGREES
EKG T AXIS: 84 DEGREES
EKG VENTRICULAR RATE: 85 BPM
EKG VENTRICULAR RATE: 90 BPM
ERYTHROCYTE [DISTWIDTH] IN BLOOD BY AUTOMATED COUNT: 11.9 % (ref 11.5–14.5)
GFR SERPL CREATININE-BSD FRML MDRD: 11 ML/MIN/1.73M2
GLUCOSE BLD STRIP.AUTO-MCNC: 134 MG/DL (ref 70–108)
GLUCOSE BLD STRIP.AUTO-MCNC: 165 MG/DL (ref 70–108)
GLUCOSE BLD STRIP.AUTO-MCNC: 169 MG/DL (ref 70–108)
GLUCOSE BLD STRIP.AUTO-MCNC: 180 MG/DL (ref 70–108)
GLUCOSE SERPL-MCNC: 112 MG/DL (ref 74–109)
HCT VFR BLD AUTO: 29.6 % (ref 42–52)
HGB BLD-MCNC: 10.3 GM/DL (ref 14–18)
MAGNESIUM SERPL-MCNC: 2 MG/DL (ref 1.6–2.6)
MCH RBC QN AUTO: 33.9 PG (ref 26–33)
MCHC RBC AUTO-ENTMCNC: 34.8 GM/DL (ref 32.2–35.5)
MCV RBC AUTO: 97.4 FL (ref 80–94)
PHOSPHATE SERPL-MCNC: 5.6 MG/DL (ref 2.5–4.5)
PLATELET # BLD AUTO: 305 THOU/MM3 (ref 130–400)
PMV BLD AUTO: 9.2 FL (ref 9.4–12.4)
POTASSIUM SERPL-SCNC: 4.9 MEQ/L (ref 3.5–5.2)
PTH-INTACT SERPL-MCNC: 167 PG/ML (ref 15–65)
RBC # BLD AUTO: 3.04 MILL/MM3 (ref 4.7–6.1)
SODIUM SERPL-SCNC: 138 MEQ/L (ref 135–145)
WBC # BLD AUTO: 7.4 THOU/MM3 (ref 4.8–10.8)

## 2025-07-16 PROCEDURE — 36415 COLL VENOUS BLD VENIPUNCTURE: CPT

## 2025-07-16 PROCEDURE — 6370000000 HC RX 637 (ALT 250 FOR IP): Performed by: PHYSICIAN ASSISTANT

## 2025-07-16 PROCEDURE — 92611 MOTION FLUOROSCOPY/SWALLOW: CPT

## 2025-07-16 PROCEDURE — 2060000000 HC ICU INTERMEDIATE R&B

## 2025-07-16 PROCEDURE — 82948 REAGENT STRIP/BLOOD GLUCOSE: CPT

## 2025-07-16 PROCEDURE — 83735 ASSAY OF MAGNESIUM: CPT

## 2025-07-16 PROCEDURE — 80048 BASIC METABOLIC PNL TOTAL CA: CPT

## 2025-07-16 PROCEDURE — 2580000003 HC RX 258: Performed by: INTERNAL MEDICINE

## 2025-07-16 PROCEDURE — 93005 ELECTROCARDIOGRAM TRACING: CPT

## 2025-07-16 PROCEDURE — 6370000000 HC RX 637 (ALT 250 FOR IP): Performed by: INTERNAL MEDICINE

## 2025-07-16 PROCEDURE — 85027 COMPLETE CBC AUTOMATED: CPT

## 2025-07-16 PROCEDURE — 84100 ASSAY OF PHOSPHORUS: CPT

## 2025-07-16 PROCEDURE — 99233 SBSQ HOSP IP/OBS HIGH 50: CPT | Performed by: INTERNAL MEDICINE

## 2025-07-16 PROCEDURE — 99233 SBSQ HOSP IP/OBS HIGH 50: CPT | Performed by: PHYSICIAN ASSISTANT

## 2025-07-16 PROCEDURE — 83970 ASSAY OF PARATHORMONE: CPT

## 2025-07-16 PROCEDURE — 6360000002 HC RX W HCPCS

## 2025-07-16 PROCEDURE — 2580000003 HC RX 258

## 2025-07-16 PROCEDURE — 2500000003 HC RX 250 WO HCPCS

## 2025-07-16 PROCEDURE — 74230 X-RAY XM SWLNG FUNCJ C+: CPT

## 2025-07-16 PROCEDURE — 93010 ELECTROCARDIOGRAM REPORT: CPT | Performed by: NUCLEAR MEDICINE

## 2025-07-16 PROCEDURE — 2500000003 HC RX 250 WO HCPCS: Performed by: INTERNAL MEDICINE

## 2025-07-16 PROCEDURE — 2500000003 HC RX 250 WO HCPCS: Performed by: PHYSICIAN ASSISTANT

## 2025-07-16 PROCEDURE — 6370000000 HC RX 637 (ALT 250 FOR IP)

## 2025-07-16 PROCEDURE — 6360000002 HC RX W HCPCS: Performed by: PHYSICIAN ASSISTANT

## 2025-07-16 RX ORDER — CARVEDILOL 6.25 MG/1
12.5 TABLET ORAL 2 TIMES DAILY WITH MEALS
Status: DISCONTINUED | OUTPATIENT
Start: 2025-07-16 | End: 2025-07-18

## 2025-07-16 RX ORDER — HYDRALAZINE HYDROCHLORIDE 20 MG/ML
10 INJECTION INTRAMUSCULAR; INTRAVENOUS EVERY 6 HOURS PRN
Status: DISCONTINUED | OUTPATIENT
Start: 2025-07-16 | End: 2025-07-24 | Stop reason: HOSPADM

## 2025-07-16 RX ORDER — HYDRALAZINE HYDROCHLORIDE 25 MG/1
25 TABLET, FILM COATED ORAL EVERY 8 HOURS SCHEDULED
Status: DISCONTINUED | OUTPATIENT
Start: 2025-07-16 | End: 2025-07-17

## 2025-07-16 RX ORDER — DIPHENHYDRAMINE HYDROCHLORIDE 50 MG/ML
25 INJECTION, SOLUTION INTRAMUSCULAR; INTRAVENOUS EVERY 6 HOURS PRN
Status: DISCONTINUED | OUTPATIENT
Start: 2025-07-16 | End: 2025-07-24 | Stop reason: HOSPADM

## 2025-07-16 RX ORDER — BENZOCAINE/MENTHOL 6 MG-10 MG
LOZENGE MUCOUS MEMBRANE 2 TIMES DAILY
Status: DISCONTINUED | OUTPATIENT
Start: 2025-07-16 | End: 2025-07-24 | Stop reason: HOSPADM

## 2025-07-16 RX ADMIN — SODIUM CHLORIDE, PRESERVATIVE FREE 10 ML: 5 INJECTION INTRAVENOUS at 08:43

## 2025-07-16 RX ADMIN — HYDRALAZINE HYDROCHLORIDE 25 MG: 25 TABLET ORAL at 12:24

## 2025-07-16 RX ADMIN — BARIUM SULFATE 10 ML: 400 PASTE ORAL at 08:05

## 2025-07-16 RX ADMIN — DIPHENHYDRAMINE HYDROCHLORIDE 25 MG: 50 INJECTION INTRAMUSCULAR; INTRAVENOUS at 14:09

## 2025-07-16 RX ADMIN — NICARDIPINE HYDROCHLORIDE 5 MG/HR: 25 INJECTION INTRAVENOUS at 05:49

## 2025-07-16 RX ADMIN — CARVEDILOL 12.5 MG: 6.25 TABLET, FILM COATED ORAL at 15:40

## 2025-07-16 RX ADMIN — ENOXAPARIN SODIUM 30 MG: 100 INJECTION SUBCUTANEOUS at 08:43

## 2025-07-16 RX ADMIN — ACETAMINOPHEN 650 MG: 325 TABLET ORAL at 15:39

## 2025-07-16 RX ADMIN — HYDRALAZINE HYDROCHLORIDE 25 MG: 25 TABLET ORAL at 20:05

## 2025-07-16 RX ADMIN — HYDRALAZINE HYDROCHLORIDE 10 MG: 20 INJECTION INTRAMUSCULAR; INTRAVENOUS at 18:16

## 2025-07-16 RX ADMIN — BARIUM SULFATE 50 ML: 0.81 POWDER, FOR SUSPENSION ORAL at 08:05

## 2025-07-16 RX ADMIN — SODIUM BICARBONATE: 84 INJECTION, SOLUTION INTRAVENOUS at 23:49

## 2025-07-16 RX ADMIN — INSULIN LISPRO 2 UNITS: 100 INJECTION, SOLUTION INTRAVENOUS; SUBCUTANEOUS at 19:36

## 2025-07-16 RX ADMIN — HYDROCORTISONE: 1 CREAM TOPICAL at 14:09

## 2025-07-16 RX ADMIN — HYDRALAZINE HYDROCHLORIDE 10 MG: 20 INJECTION INTRAMUSCULAR; INTRAVENOUS at 22:16

## 2025-07-16 RX ADMIN — CARVEDILOL 6.25 MG: 6.25 TABLET, FILM COATED ORAL at 08:43

## 2025-07-16 RX ADMIN — SODIUM BICARBONATE: 84 INJECTION, SOLUTION INTRAVENOUS at 01:46

## 2025-07-16 RX ADMIN — DIPHENHYDRAMINE HYDROCHLORIDE 25 MG: 50 INJECTION INTRAMUSCULAR; INTRAVENOUS at 22:16

## 2025-07-16 RX ADMIN — ACETAMINOPHEN 650 MG: 325 TABLET ORAL at 04:17

## 2025-07-16 ASSESSMENT — PAIN DESCRIPTION - LOCATION
LOCATION: HEAD

## 2025-07-16 ASSESSMENT — PAIN - FUNCTIONAL ASSESSMENT
PAIN_FUNCTIONAL_ASSESSMENT: ACTIVITIES ARE NOT PREVENTED
PAIN_FUNCTIONAL_ASSESSMENT: PREVENTS OR INTERFERES SOME ACTIVE ACTIVITIES AND ADLS
PAIN_FUNCTIONAL_ASSESSMENT: PREVENTS OR INTERFERES SOME ACTIVE ACTIVITIES AND ADLS

## 2025-07-16 ASSESSMENT — PAIN DESCRIPTION - PAIN TYPE: TYPE: ACUTE PAIN

## 2025-07-16 ASSESSMENT — PAIN DESCRIPTION - ORIENTATION
ORIENTATION: MID
ORIENTATION: POSTERIOR
ORIENTATION: POSTERIOR

## 2025-07-16 ASSESSMENT — PAIN DESCRIPTION - FREQUENCY
FREQUENCY: INTERMITTENT
FREQUENCY: INTERMITTENT

## 2025-07-16 ASSESSMENT — PAIN SCALES - GENERAL
PAINLEVEL_OUTOF10: 0
PAINLEVEL_OUTOF10: 4
PAINLEVEL_OUTOF10: 3
PAINLEVEL_OUTOF10: 5
PAINLEVEL_OUTOF10: 0
PAINLEVEL_OUTOF10: 3
PAINLEVEL_OUTOF10: 2

## 2025-07-16 ASSESSMENT — PAIN DESCRIPTION - DESCRIPTORS
DESCRIPTORS: ACHING;POUNDING
DESCRIPTORS: ACHING;DULL
DESCRIPTORS: ACHING;DULL

## 2025-07-16 ASSESSMENT — PAIN DESCRIPTION - ONSET
ONSET: GRADUAL
ONSET: GRADUAL

## 2025-07-16 NOTE — PLAN OF CARE
Problem: Chronic Conditions and Co-morbidities  Goal: Patient's chronic conditions and co-morbidity symptoms are monitored and maintained or improved  Flowsheets (Taken 7/15/2025 2027)  Care Plan - Patient's Chronic Conditions and Co-Morbidity Symptoms are Monitored and Maintained or Improved:   Monitor and assess patient's chronic conditions and comorbid symptoms for stability, deterioration, or improvement   Collaborate with multidisciplinary team to address chronic and comorbid conditions and prevent exacerbation or deterioration   Update acute care plan with appropriate goals if chronic or comorbid symptoms are exacerbated and prevent overall improvement and discharge     Problem: Discharge Planning  Goal: Discharge to home or other facility with appropriate resources  Flowsheets (Taken 7/15/2025 2027)  Discharge to home or other facility with appropriate resources:   Identify barriers to discharge with patient and caregiver   Arrange for needed discharge resources and transportation as appropriate   Identify discharge learning needs (meds, wound care, etc)     Problem: Pain  Goal: Verbalizes/displays adequate comfort level or baseline comfort level  Flowsheets (Taken 7/15/2025 2030)  Verbalizes/displays adequate comfort level or baseline comfort level:   Encourage patient to monitor pain and request assistance   Assess pain using appropriate pain scale   Administer analgesics based on type and severity of pain and evaluate response   Implement non-pharmacological measures as appropriate and evaluate response     Problem: Safety - Adult  Goal: Free from fall injury  Flowsheets (Taken 7/15/2025 1016 by Ct Wells RN)  Free From Fall Injury: Instruct family/caregiver on patient safety

## 2025-07-16 NOTE — PLAN OF CARE
Problem: Pain  Goal: Verbalizes/displays adequate comfort level or baseline comfort level  7/16/2025 0913 by Ct Wells, RN  Outcome: Progressing  Flowsheets (Taken 7/16/2025 0833)  Verbalizes/displays adequate comfort level or baseline comfort level:   Encourage patient to monitor pain and request assistance   Assess pain using appropriate pain scale   Administer analgesics based on type and severity of pain and evaluate response     Problem: Safety - Adult  Goal: Free from fall injury  7/16/2025 0913 by Ct Wells, RN  Outcome: Progressing  Flowsheets (Taken 7/16/2025 0913)  Free From Fall Injury: Instruct family/caregiver on patient safety

## 2025-07-16 NOTE — PROCEDURES
PROCEDURE NOTE  Date: 7/16/2025   Name: Pedro Sotelo  YOB: 1985    Procedures  12 lead EKG completed. Results handed to Alfreda RODRIGUEZ.

## 2025-07-17 LAB
ANION GAP SERPL CALC-SCNC: 13 MEQ/L (ref 8–16)
BUN SERPL-MCNC: 48 MG/DL (ref 8–23)
CALCIUM SERPL-MCNC: 8.2 MG/DL (ref 8.6–10)
CHLORIDE SERPL-SCNC: 108 MEQ/L (ref 98–111)
CO2 SERPL-SCNC: 18 MEQ/L (ref 22–29)
CREAT SERPL-MCNC: 6.3 MG/DL (ref 0.7–1.2)
DEPRECATED RDW RBC AUTO: 43.5 FL (ref 35–45)
ERYTHROCYTE [DISTWIDTH] IN BLOOD BY AUTOMATED COUNT: 11.9 % (ref 11.5–14.5)
GFR SERPL CREATININE-BSD FRML MDRD: 11 ML/MIN/1.73M2
GLUCOSE BLD STRIP.AUTO-MCNC: 112 MG/DL (ref 70–108)
GLUCOSE BLD STRIP.AUTO-MCNC: 154 MG/DL (ref 70–108)
GLUCOSE BLD STRIP.AUTO-MCNC: 207 MG/DL (ref 70–108)
GLUCOSE BLD STRIP.AUTO-MCNC: 276 MG/DL (ref 70–108)
GLUCOSE SERPL-MCNC: 129 MG/DL (ref 74–109)
HCT VFR BLD AUTO: 30 % (ref 42–52)
HGB BLD-MCNC: 10.2 GM/DL (ref 14–18)
MAGNESIUM SERPL-MCNC: 2.2 MG/DL (ref 1.6–2.6)
MCH RBC QN AUTO: 33.4 PG (ref 26–33)
MCHC RBC AUTO-ENTMCNC: 34 GM/DL (ref 32.2–35.5)
MCV RBC AUTO: 98.4 FL (ref 80–94)
PHOSPHATE SERPL-MCNC: 5.4 MG/DL (ref 2.5–4.5)
PLATELET # BLD AUTO: 319 THOU/MM3 (ref 130–400)
PMV BLD AUTO: 9.3 FL (ref 9.4–12.4)
POTASSIUM SERPL-SCNC: 4.8 MEQ/L (ref 3.5–5.2)
RBC # BLD AUTO: 3.05 MILL/MM3 (ref 4.7–6.1)
SODIUM SERPL-SCNC: 139 MEQ/L (ref 135–145)
WBC # BLD AUTO: 7.5 THOU/MM3 (ref 4.8–10.8)

## 2025-07-17 PROCEDURE — 6370000000 HC RX 637 (ALT 250 FOR IP): Performed by: INTERNAL MEDICINE

## 2025-07-17 PROCEDURE — 6360000002 HC RX W HCPCS

## 2025-07-17 PROCEDURE — 85027 COMPLETE CBC AUTOMATED: CPT

## 2025-07-17 PROCEDURE — 84100 ASSAY OF PHOSPHORUS: CPT

## 2025-07-17 PROCEDURE — 6370000000 HC RX 637 (ALT 250 FOR IP)

## 2025-07-17 PROCEDURE — 83735 ASSAY OF MAGNESIUM: CPT

## 2025-07-17 PROCEDURE — 2500000003 HC RX 250 WO HCPCS

## 2025-07-17 PROCEDURE — 82948 REAGENT STRIP/BLOOD GLUCOSE: CPT

## 2025-07-17 PROCEDURE — 6370000000 HC RX 637 (ALT 250 FOR IP): Performed by: PHYSICIAN ASSISTANT

## 2025-07-17 PROCEDURE — 2580000003 HC RX 258

## 2025-07-17 PROCEDURE — 80048 BASIC METABOLIC PNL TOTAL CA: CPT

## 2025-07-17 PROCEDURE — 99233 SBSQ HOSP IP/OBS HIGH 50: CPT | Performed by: PHYSICIAN ASSISTANT

## 2025-07-17 PROCEDURE — 6360000002 HC RX W HCPCS: Performed by: PHYSICIAN ASSISTANT

## 2025-07-17 PROCEDURE — 2060000000 HC ICU INTERMEDIATE R&B

## 2025-07-17 PROCEDURE — 99233 SBSQ HOSP IP/OBS HIGH 50: CPT | Performed by: INTERNAL MEDICINE

## 2025-07-17 PROCEDURE — 36415 COLL VENOUS BLD VENIPUNCTURE: CPT

## 2025-07-17 PROCEDURE — 2580000003 HC RX 258: Performed by: INTERNAL MEDICINE

## 2025-07-17 RX ORDER — PROCHLORPERAZINE EDISYLATE 5 MG/ML
10 INJECTION INTRAMUSCULAR; INTRAVENOUS EVERY 6 HOURS PRN
Status: DISCONTINUED | OUTPATIENT
Start: 2025-07-17 | End: 2025-07-24 | Stop reason: HOSPADM

## 2025-07-17 RX ORDER — SODIUM CHLORIDE 9 MG/ML
INJECTION, SOLUTION INTRAVENOUS CONTINUOUS
Status: DISCONTINUED | OUTPATIENT
Start: 2025-07-17 | End: 2025-07-19

## 2025-07-17 RX ORDER — HYDRALAZINE HYDROCHLORIDE 50 MG/1
50 TABLET, FILM COATED ORAL EVERY 8 HOURS SCHEDULED
Status: DISCONTINUED | OUTPATIENT
Start: 2025-07-17 | End: 2025-07-18

## 2025-07-17 RX ADMIN — HYDRALAZINE HYDROCHLORIDE 50 MG: 50 TABLET ORAL at 13:42

## 2025-07-17 RX ADMIN — SODIUM CHLORIDE: 0.9 INJECTION, SOLUTION INTRAVENOUS at 11:43

## 2025-07-17 RX ADMIN — DIPHENHYDRAMINE HYDROCHLORIDE 25 MG: 50 INJECTION INTRAMUSCULAR; INTRAVENOUS at 08:13

## 2025-07-17 RX ADMIN — PROCHLORPERAZINE EDISYLATE 10 MG: 5 INJECTION INTRAMUSCULAR; INTRAVENOUS at 16:12

## 2025-07-17 RX ADMIN — CARVEDILOL 12.5 MG: 6.25 TABLET, FILM COATED ORAL at 08:11

## 2025-07-17 RX ADMIN — HYDRALAZINE HYDROCHLORIDE 10 MG: 20 INJECTION INTRAMUSCULAR; INTRAVENOUS at 03:08

## 2025-07-17 RX ADMIN — HYDRALAZINE HYDROCHLORIDE 25 MG: 25 TABLET ORAL at 05:33

## 2025-07-17 RX ADMIN — NICARDIPINE HYDROCHLORIDE 2.5 MG/HR: 25 INJECTION INTRAVENOUS at 09:10

## 2025-07-17 RX ADMIN — SODIUM CHLORIDE, PRESERVATIVE FREE 10 ML: 5 INJECTION INTRAVENOUS at 08:14

## 2025-07-17 RX ADMIN — HYDRALAZINE HYDROCHLORIDE 50 MG: 50 TABLET ORAL at 21:33

## 2025-07-17 RX ADMIN — ACETAMINOPHEN 650 MG: 325 TABLET ORAL at 08:10

## 2025-07-17 RX ADMIN — HYDROCORTISONE: 1 CREAM TOPICAL at 08:17

## 2025-07-17 RX ADMIN — ENOXAPARIN SODIUM 30 MG: 100 INJECTION SUBCUTANEOUS at 08:12

## 2025-07-17 RX ADMIN — INSULIN LISPRO 4 UNITS: 100 INJECTION, SOLUTION INTRAVENOUS; SUBCUTANEOUS at 21:33

## 2025-07-17 RX ADMIN — ACETAMINOPHEN 650 MG: 325 TABLET ORAL at 16:12

## 2025-07-17 RX ADMIN — HYDROCORTISONE: 1 CREAM TOPICAL at 21:33

## 2025-07-17 RX ADMIN — INSULIN LISPRO 2 UNITS: 100 INJECTION, SOLUTION INTRAVENOUS; SUBCUTANEOUS at 11:50

## 2025-07-17 RX ADMIN — DIPHENHYDRAMINE HYDROCHLORIDE 25 MG: 50 INJECTION INTRAMUSCULAR; INTRAVENOUS at 16:12

## 2025-07-17 RX ADMIN — CARVEDILOL 12.5 MG: 6.25 TABLET, FILM COATED ORAL at 16:12

## 2025-07-17 ASSESSMENT — PAIN DESCRIPTION - LOCATION
LOCATION: HEAD
LOCATION: HEAD

## 2025-07-17 ASSESSMENT — PAIN SCALES - GENERAL
PAINLEVEL_OUTOF10: 7
PAINLEVEL_OUTOF10: 7
PAINLEVEL_OUTOF10: 3
PAINLEVEL_OUTOF10: 0

## 2025-07-17 ASSESSMENT — PAIN DESCRIPTION - PAIN TYPE: TYPE: ACUTE PAIN

## 2025-07-17 ASSESSMENT — PAIN - FUNCTIONAL ASSESSMENT
PAIN_FUNCTIONAL_ASSESSMENT: PREVENTS OR INTERFERES SOME ACTIVE ACTIVITIES AND ADLS
PAIN_FUNCTIONAL_ASSESSMENT: ACTIVITIES ARE NOT PREVENTED

## 2025-07-17 ASSESSMENT — PAIN DESCRIPTION - DESCRIPTORS
DESCRIPTORS: ACHING
DESCRIPTORS: ACHING

## 2025-07-17 NOTE — PLAN OF CARE
Problem: Chronic Conditions and Co-morbidities  Goal: Patient's chronic conditions and co-morbidity symptoms are monitored and maintained or improved  Outcome: Progressing  Flowsheets (Taken 7/16/2025 2000)  Care Plan - Patient's Chronic Conditions and Co-Morbidity Symptoms are Monitored and Maintained or Improved:   Monitor and assess patient's chronic conditions and comorbid symptoms for stability, deterioration, or improvement   Collaborate with multidisciplinary team to address chronic and comorbid conditions and prevent exacerbation or deterioration   Update acute care plan with appropriate goals if chronic or comorbid symptoms are exacerbated and prevent overall improvement and discharge     Problem: Discharge Planning  Goal: Discharge to home or other facility with appropriate resources  Outcome: Progressing  Flowsheets (Taken 7/16/2025 2000)  Discharge to home or other facility with appropriate resources:   Identify barriers to discharge with patient and caregiver   Arrange for needed discharge resources and transportation as appropriate   Identify discharge learning needs (meds, wound care, etc)   Refer to discharge planning if patient needs post-hospital services based on physician order or complex needs related to functional status, cognitive ability or social support system     Problem: Pain  Goal: Verbalizes/displays adequate comfort level or baseline comfort level  7/16/2025 2028 by João Prater, RN  Outcome: Progressing  7/16/2025 0913 by Ct Wells, RN  Outcome: Progressing  Flowsheets (Taken 7/16/2025 0833)  Verbalizes/displays adequate comfort level or baseline comfort level:   Encourage patient to monitor pain and request assistance   Assess pain using appropriate pain scale   Administer analgesics based on type and severity of pain and evaluate response     Problem: Safety - Adult  Goal: Free from fall injury  7/16/2025 2028 by João Prater, RN  Outcome: Progressing  Flowsheets (Taken

## 2025-07-17 NOTE — CARE COORDINATION
7/17/25, 3:22 PM EDT    DISCHARGE ON GOING EVALUATION    [facility] day: 2  Location: -07/007-A Reason for admit: Hypertensive crisis [I16.9]     Procedures:   7/15 Echo with EF 50-55%    Imaging since last note:   7/16 MBS: Laryngeal penetration was observed with thin barium. There was no tracheal aspiration    Barriers to Discharge: Passed MBS; on regular diet. Coreg dose increased yesterday. PO hydralazine dose increased today. PRN hydralazine added. Cardene drip weaned off this morning. Creat up to 6.3 today; no dialysis yet. Plan for renal biopsy in IR on Monday.     Afebrile. NSR. On room air. Ox4. SLP. Hospitalist and Nephrology following. Telemetry, neuro checks. Coreg, prn IV benadryl, lovenox, prn IV hydralazine, po hydralazine, hydrocortisone cream - chest, SSI. BUN 48, Creat 6.3, hgb 10.2.     PCP: Nga Martines DO  Readmission Risk Score: 16.4    Patient Goals/Plan/Treatment Preferences: Home with fiance and kids. Denies needs, declines HH.

## 2025-07-18 ENCOUNTER — APPOINTMENT (OUTPATIENT)
Dept: INTERVENTIONAL RADIOLOGY/VASCULAR | Age: 40
DRG: 280 | End: 2025-07-18
Attending: STUDENT IN AN ORGANIZED HEALTH CARE EDUCATION/TRAINING PROGRAM
Payer: COMMERCIAL

## 2025-07-18 LAB
ANION GAP SERPL CALC-SCNC: 14 MEQ/L (ref 8–16)
BUN SERPL-MCNC: 52 MG/DL (ref 8–23)
CALCIUM SERPL-MCNC: 7.9 MG/DL (ref 8.6–10)
CHLORIDE SERPL-SCNC: 109 MEQ/L (ref 98–111)
CO2 SERPL-SCNC: 16 MEQ/L (ref 22–29)
CREAT SERPL-MCNC: 6.6 MG/DL (ref 0.7–1.2)
DEPRECATED RDW RBC AUTO: 44.6 FL (ref 35–45)
DSDNA IGG SER QL IA: NORMAL
ERYTHROCYTE [DISTWIDTH] IN BLOOD BY AUTOMATED COUNT: 12.1 % (ref 11.5–14.5)
GFR SERPL CREATININE-BSD FRML MDRD: 10 ML/MIN/1.73M2
GLUCOSE BLD STRIP.AUTO-MCNC: 166 MG/DL (ref 70–108)
GLUCOSE BLD STRIP.AUTO-MCNC: 187 MG/DL (ref 70–108)
GLUCOSE BLD STRIP.AUTO-MCNC: 224 MG/DL (ref 70–108)
GLUCOSE SERPL-MCNC: 159 MG/DL (ref 74–109)
HBV CORE IGM SERPL QL IA: NONREACTIVE
HBV SURFACE AB TITR SER: 3.8 MIU/ML
HBV SURFACE AG SERPL QL IA: NONREACTIVE
HCT VFR BLD AUTO: 27.6 % (ref 42–52)
HGB BLD-MCNC: 9.3 GM/DL (ref 14–18)
MAGNESIUM SERPL-MCNC: 2.1 MG/DL (ref 1.6–2.6)
MCH RBC QN AUTO: 33.9 PG (ref 26–33)
MCHC RBC AUTO-ENTMCNC: 33.7 GM/DL (ref 32.2–35.5)
MCV RBC AUTO: 100.7 FL (ref 80–94)
MYELOPEROXIDASE AB SER-ACNC: 0 AU/ML (ref 0–19)
NT-PROBNP SERPL IA-MCNC: 4470 PG/ML (ref 0–124)
NUCLEAR IGG SER QL IA: NORMAL
PHOSPHATE SERPL-MCNC: 5.9 MG/DL (ref 2.5–4.5)
PLATELET # BLD AUTO: 297 THOU/MM3 (ref 130–400)
PMV BLD AUTO: 9.6 FL (ref 9.4–12.4)
POTASSIUM SERPL-SCNC: 5.5 MEQ/L (ref 3.5–5.2)
PROTEINASE3 AB SER-ACNC: 0 AU/ML (ref 0–19)
RBC # BLD AUTO: 2.74 MILL/MM3 (ref 4.7–6.1)
SODIUM SERPL-SCNC: 139 MEQ/L (ref 135–145)
WBC # BLD AUTO: 7.3 THOU/MM3 (ref 4.8–10.8)

## 2025-07-18 PROCEDURE — 6370000000 HC RX 637 (ALT 250 FOR IP): Performed by: PHYSICIAN ASSISTANT

## 2025-07-18 PROCEDURE — C1894 INTRO/SHEATH, NON-LASER: HCPCS

## 2025-07-18 PROCEDURE — 6370000000 HC RX 637 (ALT 250 FOR IP): Performed by: NURSE PRACTITIONER

## 2025-07-18 PROCEDURE — 6360000002 HC RX W HCPCS: Performed by: RADIOLOGY

## 2025-07-18 PROCEDURE — 2060000000 HC ICU INTERMEDIATE R&B

## 2025-07-18 PROCEDURE — 84100 ASSAY OF PHOSPHORUS: CPT

## 2025-07-18 PROCEDURE — 6370000000 HC RX 637 (ALT 250 FOR IP)

## 2025-07-18 PROCEDURE — 80048 BASIC METABOLIC PNL TOTAL CA: CPT

## 2025-07-18 PROCEDURE — 83735 ASSAY OF MAGNESIUM: CPT

## 2025-07-18 PROCEDURE — 6370000000 HC RX 637 (ALT 250 FOR IP): Performed by: INTERNAL MEDICINE

## 2025-07-18 PROCEDURE — 5A1D70Z PERFORMANCE OF URINARY FILTRATION, INTERMITTENT, LESS THAN 6 HOURS PER DAY: ICD-10-PCS | Performed by: STUDENT IN AN ORGANIZED HEALTH CARE EDUCATION/TRAINING PROGRAM

## 2025-07-18 PROCEDURE — 82948 REAGENT STRIP/BLOOD GLUCOSE: CPT

## 2025-07-18 PROCEDURE — 02H633Z INSERTION OF INFUSION DEVICE INTO RIGHT ATRIUM, PERCUTANEOUS APPROACH: ICD-10-PCS | Performed by: RADIOLOGY

## 2025-07-18 PROCEDURE — 99233 SBSQ HOSP IP/OBS HIGH 50: CPT | Performed by: INTERNAL MEDICINE

## 2025-07-18 PROCEDURE — 86705 HEP B CORE ANTIBODY IGM: CPT

## 2025-07-18 PROCEDURE — 77001 FLUOROGUIDE FOR VEIN DEVICE: CPT

## 2025-07-18 PROCEDURE — 6360000002 HC RX W HCPCS: Performed by: PHYSICIAN ASSISTANT

## 2025-07-18 PROCEDURE — 87340 HEPATITIS B SURFACE AG IA: CPT

## 2025-07-18 PROCEDURE — 36556 INSERT NON-TUNNEL CV CATH: CPT

## 2025-07-18 PROCEDURE — 85027 COMPLETE CBC AUTOMATED: CPT

## 2025-07-18 PROCEDURE — 99232 SBSQ HOSP IP/OBS MODERATE 35: CPT | Performed by: PHYSICIAN ASSISTANT

## 2025-07-18 PROCEDURE — 36415 COLL VENOUS BLD VENIPUNCTURE: CPT

## 2025-07-18 PROCEDURE — 86706 HEP B SURFACE ANTIBODY: CPT

## 2025-07-18 PROCEDURE — 83880 ASSAY OF NATRIURETIC PEPTIDE: CPT

## 2025-07-18 PROCEDURE — 90935 HEMODIALYSIS ONE EVALUATION: CPT

## 2025-07-18 RX ORDER — LIDOCAINE HYDROCHLORIDE 20 MG/ML
INJECTION, SOLUTION EPIDURAL; INFILTRATION; INTRACAUDAL; PERINEURAL PRN
Status: COMPLETED | OUTPATIENT
Start: 2025-07-18 | End: 2025-07-18

## 2025-07-18 RX ORDER — HYDRALAZINE HYDROCHLORIDE 50 MG/1
100 TABLET, FILM COATED ORAL EVERY 8 HOURS SCHEDULED
Status: DISCONTINUED | OUTPATIENT
Start: 2025-07-18 | End: 2025-07-24 | Stop reason: HOSPADM

## 2025-07-18 RX ORDER — AMLODIPINE BESYLATE 10 MG/1
10 TABLET ORAL DAILY
Status: DISCONTINUED | OUTPATIENT
Start: 2025-07-18 | End: 2025-07-18 | Stop reason: SDUPTHER

## 2025-07-18 RX ORDER — DOXAZOSIN 2 MG/1
2 TABLET ORAL
Status: DISCONTINUED | OUTPATIENT
Start: 2025-07-18 | End: 2025-07-19

## 2025-07-18 RX ORDER — CARVEDILOL 25 MG/1
25 TABLET ORAL 2 TIMES DAILY WITH MEALS
Status: DISCONTINUED | OUTPATIENT
Start: 2025-07-18 | End: 2025-07-24 | Stop reason: HOSPADM

## 2025-07-18 RX ADMIN — LIDOCAINE HYDROCHLORIDE 2 ML: 20 INJECTION, SOLUTION EPIDURAL; INFILTRATION; INTRACAUDAL; PERINEURAL at 14:19

## 2025-07-18 RX ADMIN — INSULIN LISPRO 2 UNITS: 100 INJECTION, SOLUTION INTRAVENOUS; SUBCUTANEOUS at 20:56

## 2025-07-18 RX ADMIN — HYDROCORTISONE: 1 CREAM TOPICAL at 09:42

## 2025-07-18 RX ADMIN — HYDRALAZINE HYDROCHLORIDE 50 MG: 50 TABLET ORAL at 05:48

## 2025-07-18 RX ADMIN — HYDRALAZINE HYDROCHLORIDE 100 MG: 50 TABLET ORAL at 17:59

## 2025-07-18 RX ADMIN — HYDRALAZINE HYDROCHLORIDE 10 MG: 20 INJECTION INTRAMUSCULAR; INTRAVENOUS at 09:44

## 2025-07-18 RX ADMIN — INSULIN LISPRO 2 UNITS: 100 INJECTION, SOLUTION INTRAVENOUS; SUBCUTANEOUS at 13:21

## 2025-07-18 RX ADMIN — DIPHENHYDRAMINE HYDROCHLORIDE 25 MG: 50 INJECTION INTRAMUSCULAR; INTRAVENOUS at 18:22

## 2025-07-18 RX ADMIN — CARVEDILOL 25 MG: 25 TABLET, FILM COATED ORAL at 17:57

## 2025-07-18 RX ADMIN — HYDRALAZINE HYDROCHLORIDE 100 MG: 50 TABLET ORAL at 23:14

## 2025-07-18 RX ADMIN — ACETAMINOPHEN 650 MG: 325 TABLET ORAL at 17:57

## 2025-07-18 RX ADMIN — DOXAZOSIN 2 MG: 2 TABLET ORAL at 20:56

## 2025-07-18 RX ADMIN — HYDRALAZINE HYDROCHLORIDE 10 MG: 20 INJECTION INTRAMUSCULAR; INTRAVENOUS at 19:05

## 2025-07-18 RX ADMIN — ACETAMINOPHEN 650 MG: 325 TABLET ORAL at 23:20

## 2025-07-18 RX ADMIN — CARVEDILOL 12.5 MG: 6.25 TABLET, FILM COATED ORAL at 10:17

## 2025-07-18 ASSESSMENT — PAIN DESCRIPTION - PAIN TYPE: TYPE: ACUTE PAIN

## 2025-07-18 ASSESSMENT — PAIN SCALES - GENERAL
PAINLEVEL_OUTOF10: 2
PAINLEVEL_OUTOF10: 6
PAINLEVEL_OUTOF10: 0
PAINLEVEL_OUTOF10: 8
PAINLEVEL_OUTOF10: 0
PAINLEVEL_OUTOF10: 7

## 2025-07-18 ASSESSMENT — PAIN DESCRIPTION - ORIENTATION
ORIENTATION: RIGHT
ORIENTATION: MID

## 2025-07-18 ASSESSMENT — PAIN DESCRIPTION - LOCATION
LOCATION: NECK
LOCATION: HEAD;THROAT
LOCATION: CHEST

## 2025-07-18 ASSESSMENT — PAIN DESCRIPTION - ONSET: ONSET: SUDDEN

## 2025-07-18 ASSESSMENT — PAIN DESCRIPTION - DESCRIPTORS
DESCRIPTORS: ACHING;SORE
DESCRIPTORS: ACHING

## 2025-07-18 ASSESSMENT — PAIN DESCRIPTION - FREQUENCY: FREQUENCY: INTERMITTENT

## 2025-07-18 ASSESSMENT — PAIN - FUNCTIONAL ASSESSMENT: PAIN_FUNCTIONAL_ASSESSMENT: ACTIVITIES ARE NOT PREVENTED

## 2025-07-18 NOTE — PLAN OF CARE
Problem: Chronic Conditions and Co-morbidities  Goal: Patient's chronic conditions and co-morbidity symptoms are monitored and maintained or improved  Outcome: Progressing     Problem: Discharge Planning  Goal: Discharge to home or other facility with appropriate resources  Outcome: Progressing     Problem: Pain  Goal: Verbalizes/displays adequate comfort level or baseline comfort level  Outcome: Progressing     Problem: Safety - Adult  Goal: Free from fall injury  Outcome: Progressing     Problem: ABCDS Injury Assessment  Goal: Absence of physical injury  Outcome: Progressing     Problem: Skin/Tissue Integrity  Goal: Skin integrity remains intact  Description: 1.  Monitor for areas of redness and/or skin breakdown  2.  Assess vascular access sites hourly  3.  Every 4-6 hours minimum:  Change oxygen saturation probe site  4.  Every 4-6 hours:  If on nasal continuous positive airway pressure, respiratory therapy assess nares and determine need for appliance change or resting period  Outcome: Progressing     Problem: Metabolic/Fluid and Electrolytes - Adult  Goal: Electrolytes maintained within normal limits  Outcome: Progressing  Goal: Hemodynamic stability and optimal renal function maintained  Outcome: Progressing  Goal: Glucose maintained within prescribed range  Outcome: Progressing   Care plan reviewed with patient.  Patient verbalizes understanding of the plan of care and contributes to goal setting.

## 2025-07-18 NOTE — H&P
Formulation and discussion of sedation / procedure plans, risks, benefits, side effects and alternatives with patient and/or responsible adult completed.    History and Physical reviewed and unchanged.    Electronically signed by Bear Sharpe MD on 7/18/25 at 2:25 PM EDT

## 2025-07-18 NOTE — OP NOTE
Department of Radiology  Post Procedure Progress Note      Pre-Procedure Diagnosis:  Renal Failure    Procedure Performed: Dialysis Catheter insertion     Anesthesia: local     Findings: successful    Immediate Complications:  None    Estimated Blood Loss: minimal    SEE DICTATED PROCEDURE NOTE FOR COMPLETE DETAILS.      Bear Sharpe MD   7/18/2025 2:25 PM

## 2025-07-18 NOTE — FLOWSHEET NOTE
07/18/25 1725   Vital Signs   BP (!) 170/98   Temp 97.9 °F (36.6 °C)   Pulse 91   Respirations 18   Post-Hemodialysis Assessment   Post-Treatment Procedures Blood returned;Catheter Capped, clamped with Saline x2 ports   Machine Disinfection Process Acid/Vinegar Clean;Exterior Machine Disinfection   Blood Volume Processed (Liters) 35.7 L   Dialyzer Clearance Lightly streaked   Duration of Treatment (minutes) 150 minutes   Hemodialysis Intake (ml) 400 ml   Hemodialysis Output (ml) 1400 ml   NET Removed (ml) 1000   Tolerated Treatment Good     Stable 2.5 hour treatment complete with 1 liter of fluid removal. Tolerated well. HD catheter ports flushed, clamped and capped with tegos and alcohol caps. Dressing clean and dry. Report given to primary RN. Treatment record printed to be scanned into chart.

## 2025-07-19 LAB
ANCA AB PATTERN SER IF-IMP: NORMAL
ANCA IGG TITR SER IF: NORMAL {TITER}
ANION GAP SERPL CALC-SCNC: 13 MEQ/L (ref 8–16)
BUN SERPL-MCNC: 37 MG/DL (ref 8–23)
CALCIUM SERPL-MCNC: 8 MG/DL (ref 8.6–10)
CHLORIDE SERPL-SCNC: 107 MEQ/L (ref 98–111)
CO2 SERPL-SCNC: 20 MEQ/L (ref 22–29)
CREAT SERPL-MCNC: 5.5 MG/DL (ref 0.7–1.2)
DEPRECATED RDW RBC AUTO: 44.1 FL (ref 35–45)
ERYTHROCYTE [DISTWIDTH] IN BLOOD BY AUTOMATED COUNT: 11.9 % (ref 11.5–14.5)
GFR SERPL CREATININE-BSD FRML MDRD: 13 ML/MIN/1.73M2
GLUCOSE BLD STRIP.AUTO-MCNC: 161 MG/DL (ref 70–108)
GLUCOSE BLD STRIP.AUTO-MCNC: 187 MG/DL (ref 70–108)
GLUCOSE BLD STRIP.AUTO-MCNC: 205 MG/DL (ref 70–108)
GLUCOSE BLD STRIP.AUTO-MCNC: 229 MG/DL (ref 70–108)
GLUCOSE SERPL-MCNC: 153 MG/DL (ref 74–109)
HCT VFR BLD AUTO: 26.7 % (ref 42–52)
HGB BLD-MCNC: 8.8 GM/DL (ref 14–18)
INTERPRETATION SERPL IFE-IMP: NORMAL
MCH RBC QN AUTO: 33.5 PG (ref 26–33)
MCHC RBC AUTO-ENTMCNC: 33 GM/DL (ref 32.2–35.5)
MCV RBC AUTO: 101.5 FL (ref 80–94)
PLATELET # BLD AUTO: 251 THOU/MM3 (ref 130–400)
PMV BLD AUTO: 9.5 FL (ref 9.4–12.4)
POTASSIUM SERPL-SCNC: 4.7 MEQ/L (ref 3.5–5.2)
PROTEIN ELECTROPHORESIS, SERUM: NORMAL
RBC # BLD AUTO: 2.63 MILL/MM3 (ref 4.7–6.1)
SODIUM SERPL-SCNC: 140 MEQ/L (ref 135–145)
WBC # BLD AUTO: 6.8 THOU/MM3 (ref 4.8–10.8)

## 2025-07-19 PROCEDURE — 80048 BASIC METABOLIC PNL TOTAL CA: CPT

## 2025-07-19 PROCEDURE — 90935 HEMODIALYSIS ONE EVALUATION: CPT | Performed by: INTERNAL MEDICINE

## 2025-07-19 PROCEDURE — 6360000002 HC RX W HCPCS: Performed by: INTERNAL MEDICINE

## 2025-07-19 PROCEDURE — 6370000000 HC RX 637 (ALT 250 FOR IP): Performed by: NURSE PRACTITIONER

## 2025-07-19 PROCEDURE — 82948 REAGENT STRIP/BLOOD GLUCOSE: CPT

## 2025-07-19 PROCEDURE — 85027 COMPLETE CBC AUTOMATED: CPT

## 2025-07-19 PROCEDURE — 36415 COLL VENOUS BLD VENIPUNCTURE: CPT

## 2025-07-19 PROCEDURE — 6370000000 HC RX 637 (ALT 250 FOR IP): Performed by: PHYSICIAN ASSISTANT

## 2025-07-19 PROCEDURE — 6370000000 HC RX 637 (ALT 250 FOR IP): Performed by: INTERNAL MEDICINE

## 2025-07-19 PROCEDURE — 2060000000 HC ICU INTERMEDIATE R&B

## 2025-07-19 PROCEDURE — 90935 HEMODIALYSIS ONE EVALUATION: CPT

## 2025-07-19 PROCEDURE — 6370000000 HC RX 637 (ALT 250 FOR IP)

## 2025-07-19 PROCEDURE — 6360000002 HC RX W HCPCS

## 2025-07-19 RX ORDER — DOXAZOSIN 4 MG/1
4 TABLET ORAL
Status: DISCONTINUED | OUTPATIENT
Start: 2025-07-19 | End: 2025-07-21

## 2025-07-19 RX ADMIN — HYDRALAZINE HYDROCHLORIDE 100 MG: 50 TABLET ORAL at 12:59

## 2025-07-19 RX ADMIN — HYDROCORTISONE: 1 CREAM TOPICAL at 21:25

## 2025-07-19 RX ADMIN — ONDANSETRON 4 MG: 2 INJECTION, SOLUTION INTRAMUSCULAR; INTRAVENOUS at 20:14

## 2025-07-19 RX ADMIN — ENOXAPARIN SODIUM 30 MG: 100 INJECTION SUBCUTANEOUS at 10:59

## 2025-07-19 RX ADMIN — HYDRALAZINE HYDROCHLORIDE 100 MG: 50 TABLET ORAL at 06:47

## 2025-07-19 RX ADMIN — DOXAZOSIN 4 MG: 2 TABLET ORAL at 21:24

## 2025-07-19 RX ADMIN — EPOETIN ALFA-EPBX 6000 UNITS: 4000 INJECTION, SOLUTION INTRAVENOUS; SUBCUTANEOUS at 12:59

## 2025-07-19 RX ADMIN — INSULIN LISPRO 2 UNITS: 100 INJECTION, SOLUTION INTRAVENOUS; SUBCUTANEOUS at 20:17

## 2025-07-19 RX ADMIN — HYDRALAZINE HYDROCHLORIDE 100 MG: 50 TABLET ORAL at 20:14

## 2025-07-19 RX ADMIN — CARVEDILOL 25 MG: 25 TABLET, FILM COATED ORAL at 18:17

## 2025-07-19 RX ADMIN — ACETAMINOPHEN 650 MG: 325 TABLET ORAL at 10:57

## 2025-07-19 RX ADMIN — INSULIN LISPRO 2 UNITS: 100 INJECTION, SOLUTION INTRAVENOUS; SUBCUTANEOUS at 18:16

## 2025-07-19 ASSESSMENT — PAIN DESCRIPTION - LOCATION: LOCATION: HEAD

## 2025-07-19 ASSESSMENT — PAIN DESCRIPTION - DESCRIPTORS: DESCRIPTORS: ACHING

## 2025-07-19 ASSESSMENT — PAIN SCALES - GENERAL: PAINLEVEL_OUTOF10: 6

## 2025-07-19 NOTE — FLOWSHEET NOTE
07/19/25 1044   Vital Signs   BP (!) 154/80   Temp 98.2 °F (36.8 °C)   Pulse 80   Respirations 14   SpO2 97 %   Weight - Scale 102.1 kg (225 lb 1.4 oz)   Weight Method Bed scale   Percent Weight Change -4.13   Post-Hemodialysis Assessment   Post-Treatment Procedures Blood returned;Catheter Capped, clamped with Saline x2 ports   Machine Disinfection Process Acid/Vinegar Clean;Heat Disinfect;Exterior Machine Disinfection   Rinseback Volume (ml) 400 ml   Blood Volume Processed (Liters) 50.5 L   Dialyzer Clearance Lightly streaked   Duration of Treatment (minutes) 195 minutes   Hemodialysis Intake (ml) 800 ml   Hemodialysis Output (ml) 1800 ml   NET Removed (ml) 1000   Tolerated Treatment Good     completed 3 hours HD TX and removed 1 liter of fluid, as ordered. pt tolerated HD TX well. The circ clotted in venous side, blood returned through Arterial side, could not return blood through venous side of circ. Dr. Aguirre is aware. Dialysis TX ended, all blood returned with 400 mls rinse back. Dialysis cath flushed with 0.9% NS, clamped with tego's and curios attached. CVC dressing is clean, dry and intact. report given to primary nurse. record completed and printed to be scanned into pt's EMR.

## 2025-07-20 LAB
ANION GAP SERPL CALC-SCNC: 9 MEQ/L (ref 8–16)
BUN SERPL-MCNC: 25 MG/DL (ref 8–23)
CALCIUM SERPL-MCNC: 7.8 MG/DL (ref 8.6–10)
CHLORIDE SERPL-SCNC: 103 MEQ/L (ref 98–111)
CO2 SERPL-SCNC: 25 MEQ/L (ref 22–29)
CREAT SERPL-MCNC: 4.8 MG/DL (ref 0.7–1.2)
DEPRECATED RDW RBC AUTO: 43.2 FL (ref 35–45)
ERYTHROCYTE [DISTWIDTH] IN BLOOD BY AUTOMATED COUNT: 11.9 % (ref 11.5–14.5)
GFR SERPL CREATININE-BSD FRML MDRD: 15 ML/MIN/1.73M2
GLOMERULAR BASEMENT MEMBRANE ANTIBODY: NORMAL
GLUCOSE BLD STRIP.AUTO-MCNC: 160 MG/DL (ref 70–108)
GLUCOSE BLD STRIP.AUTO-MCNC: 163 MG/DL (ref 70–108)
GLUCOSE BLD STRIP.AUTO-MCNC: 167 MG/DL (ref 70–108)
GLUCOSE BLD STRIP.AUTO-MCNC: 254 MG/DL (ref 70–108)
GLUCOSE BLD STRIP.AUTO-MCNC: 264 MG/DL (ref 70–108)
GLUCOSE SERPL-MCNC: 161 MG/DL (ref 74–109)
HCT VFR BLD AUTO: 24.7 % (ref 42–52)
HGB BLD-MCNC: 8.3 GM/DL (ref 14–18)
MCH RBC QN AUTO: 33.3 PG (ref 26–33)
MCHC RBC AUTO-ENTMCNC: 33.6 GM/DL (ref 32.2–35.5)
MCV RBC AUTO: 99.2 FL (ref 80–94)
PLATELET # BLD AUTO: 261 THOU/MM3 (ref 130–400)
PMV BLD AUTO: 9.8 FL (ref 9.4–12.4)
POTASSIUM SERPL-SCNC: 3.9 MEQ/L (ref 3.5–5.2)
RBC # BLD AUTO: 2.49 MILL/MM3 (ref 4.7–6.1)
SODIUM SERPL-SCNC: 137 MEQ/L (ref 135–145)
WBC # BLD AUTO: 6.5 THOU/MM3 (ref 4.8–10.8)

## 2025-07-20 PROCEDURE — 99232 SBSQ HOSP IP/OBS MODERATE 35: CPT | Performed by: PHYSICIAN ASSISTANT

## 2025-07-20 PROCEDURE — 82948 REAGENT STRIP/BLOOD GLUCOSE: CPT

## 2025-07-20 PROCEDURE — 85027 COMPLETE CBC AUTOMATED: CPT

## 2025-07-20 PROCEDURE — 6360000002 HC RX W HCPCS

## 2025-07-20 PROCEDURE — 6370000000 HC RX 637 (ALT 250 FOR IP)

## 2025-07-20 PROCEDURE — 6370000000 HC RX 637 (ALT 250 FOR IP): Performed by: PHYSICIAN ASSISTANT

## 2025-07-20 PROCEDURE — 2060000000 HC ICU INTERMEDIATE R&B

## 2025-07-20 PROCEDURE — 36415 COLL VENOUS BLD VENIPUNCTURE: CPT

## 2025-07-20 PROCEDURE — 6370000000 HC RX 637 (ALT 250 FOR IP): Performed by: NURSE PRACTITIONER

## 2025-07-20 PROCEDURE — 80048 BASIC METABOLIC PNL TOTAL CA: CPT

## 2025-07-20 PROCEDURE — 6370000000 HC RX 637 (ALT 250 FOR IP): Performed by: INTERNAL MEDICINE

## 2025-07-20 PROCEDURE — 99232 SBSQ HOSP IP/OBS MODERATE 35: CPT | Performed by: INTERNAL MEDICINE

## 2025-07-20 PROCEDURE — 2500000003 HC RX 250 WO HCPCS

## 2025-07-20 RX ORDER — HEPARIN SODIUM 5000 [USP'U]/ML
5000 INJECTION, SOLUTION INTRAVENOUS; SUBCUTANEOUS EVERY 8 HOURS SCHEDULED
Status: DISCONTINUED | OUTPATIENT
Start: 2025-07-20 | End: 2025-07-24 | Stop reason: HOSPADM

## 2025-07-20 RX ADMIN — HYDRALAZINE HYDROCHLORIDE 100 MG: 50 TABLET ORAL at 14:25

## 2025-07-20 RX ADMIN — SODIUM CHLORIDE, PRESERVATIVE FREE 10 ML: 5 INJECTION INTRAVENOUS at 08:13

## 2025-07-20 RX ADMIN — CARVEDILOL 25 MG: 25 TABLET, FILM COATED ORAL at 17:21

## 2025-07-20 RX ADMIN — HYDRALAZINE HYDROCHLORIDE 100 MG: 50 TABLET ORAL at 21:34

## 2025-07-20 RX ADMIN — HEPARIN SODIUM 5000 UNITS: 5000 INJECTION INTRAVENOUS; SUBCUTANEOUS at 21:32

## 2025-07-20 RX ADMIN — INSULIN LISPRO 2 UNITS: 100 INJECTION, SOLUTION INTRAVENOUS; SUBCUTANEOUS at 21:32

## 2025-07-20 RX ADMIN — HYDRALAZINE HYDROCHLORIDE 100 MG: 50 TABLET ORAL at 06:04

## 2025-07-20 RX ADMIN — INSULIN LISPRO 4 UNITS: 100 INJECTION, SOLUTION INTRAVENOUS; SUBCUTANEOUS at 13:03

## 2025-07-20 RX ADMIN — ACETAMINOPHEN 650 MG: 325 TABLET ORAL at 21:32

## 2025-07-20 RX ADMIN — HEPARIN SODIUM 5000 UNITS: 5000 INJECTION INTRAVENOUS; SUBCUTANEOUS at 08:10

## 2025-07-20 RX ADMIN — HEPARIN SODIUM 5000 UNITS: 5000 INJECTION INTRAVENOUS; SUBCUTANEOUS at 13:03

## 2025-07-20 RX ADMIN — CARVEDILOL 25 MG: 25 TABLET, FILM COATED ORAL at 08:10

## 2025-07-20 RX ADMIN — DOXAZOSIN 4 MG: 2 TABLET ORAL at 21:32

## 2025-07-20 ASSESSMENT — PAIN SCALES - GENERAL
PAINLEVEL_OUTOF10: 0

## 2025-07-20 NOTE — PLAN OF CARE
Care plan reviewed with patient.  Patient  verbalize understanding of the plan of care and contribute to goal setting.

## 2025-07-20 NOTE — PLAN OF CARE
Problem: Chronic Conditions and Co-morbidities  Goal: Patient's chronic conditions and co-morbidity symptoms are monitored and maintained or improved  7/20/2025 1339 by Brigida Cason RN  Outcome: Progressing  Flowsheets (Taken 7/20/2025 0800)  Care Plan - Patient's Chronic Conditions and Co-Morbidity Symptoms are Monitored and Maintained or Improved:   Monitor and assess patient's chronic conditions and comorbid symptoms for stability, deterioration, or improvement   Collaborate with multidisciplinary team to address chronic and comorbid conditions and prevent exacerbation or deterioration   Update acute care plan with appropriate goals if chronic or comorbid symptoms are exacerbated and prevent overall improvement and discharge     Problem: Discharge Planning  Goal: Discharge to home or other facility with appropriate resources  7/20/2025 1339 by Brigida Cason RN  Outcome: Progressing  Flowsheets (Taken 7/20/2025 0800)  Discharge to home or other facility with appropriate resources:   Identify barriers to discharge with patient and caregiver   Refer to discharge planning if patient needs post-hospital services based on physician order or complex needs related to functional status, cognitive ability or social support system   Arrange for needed discharge resources and transportation as appropriate     Problem: Pain  Goal: Verbalizes/displays adequate comfort level or baseline comfort level  7/20/2025 1339 by Brigida Cason RN  Outcome: Progressing  Flowsheets (Taken 7/20/2025 0800)  Verbalizes/displays adequate comfort level or baseline comfort level: Encourage patient to monitor pain and request assistance     Problem: Safety - Adult  Goal: Free from fall injury  7/20/2025 1339 by Brigida Cason RN  Outcome: Progressing     Problem: ABCDS Injury Assessment  Goal: Absence of physical injury  7/20/2025 1339 by Brigida Cason RN  Outcome: Progressing     Problem: Skin/Tissue Integrity  Goal: Skin

## 2025-07-21 ENCOUNTER — APPOINTMENT (OUTPATIENT)
Dept: INTERVENTIONAL RADIOLOGY/VASCULAR | Age: 40
DRG: 280 | End: 2025-07-21
Attending: STUDENT IN AN ORGANIZED HEALTH CARE EDUCATION/TRAINING PROGRAM
Payer: COMMERCIAL

## 2025-07-21 LAB
ANION GAP SERPL CALC-SCNC: 13 MEQ/L (ref 8–16)
BUN SERPL-MCNC: 34 MG/DL (ref 8–23)
CALCIUM SERPL-MCNC: 8.2 MG/DL (ref 8.6–10)
CHLORIDE SERPL-SCNC: 104 MEQ/L (ref 98–111)
CO2 SERPL-SCNC: 22 MEQ/L (ref 22–29)
CREAT SERPL-MCNC: 6.1 MG/DL (ref 0.7–1.2)
DEPRECATED RDW RBC AUTO: 43.9 FL (ref 35–45)
ERYTHROCYTE [DISTWIDTH] IN BLOOD BY AUTOMATED COUNT: 12 % (ref 11.5–14.5)
GFR SERPL CREATININE-BSD FRML MDRD: 11 ML/MIN/1.73M2
GLUCOSE BLD STRIP.AUTO-MCNC: 134 MG/DL (ref 70–108)
GLUCOSE BLD STRIP.AUTO-MCNC: 145 MG/DL (ref 70–108)
GLUCOSE BLD STRIP.AUTO-MCNC: 240 MG/DL (ref 70–108)
GLUCOSE SERPL-MCNC: 178 MG/DL (ref 74–109)
HCT VFR BLD AUTO: 24.6 % (ref 42–52)
HGB BLD-MCNC: 8.3 GM/DL (ref 14–18)
MCH RBC QN AUTO: 34.3 PG (ref 26–33)
MCHC RBC AUTO-ENTMCNC: 33.7 GM/DL (ref 32.2–35.5)
MCV RBC AUTO: 101.7 FL (ref 80–94)
PLATELET # BLD AUTO: 277 THOU/MM3 (ref 130–400)
PMV BLD AUTO: 9.9 FL (ref 9.4–12.4)
POTASSIUM SERPL-SCNC: 4.2 MEQ/L (ref 3.5–5.2)
RBC # BLD AUTO: 2.42 MILL/MM3 (ref 4.7–6.1)
SODIUM SERPL-SCNC: 139 MEQ/L (ref 135–145)
WBC # BLD AUTO: 7.2 THOU/MM3 (ref 4.8–10.8)

## 2025-07-21 PROCEDURE — 6370000000 HC RX 637 (ALT 250 FOR IP): Performed by: INTERNAL MEDICINE

## 2025-07-21 PROCEDURE — 2709999900 IR FLUORO GUIDED CVA DEVICE PLACEMENT (AKA CVAD)

## 2025-07-21 PROCEDURE — 6360000002 HC RX W HCPCS: Performed by: PHYSICIAN ASSISTANT

## 2025-07-21 PROCEDURE — 6360000002 HC RX W HCPCS

## 2025-07-21 PROCEDURE — 2500000003 HC RX 250 WO HCPCS: Performed by: RADIOLOGY

## 2025-07-21 PROCEDURE — 80048 BASIC METABOLIC PNL TOTAL CA: CPT

## 2025-07-21 PROCEDURE — 05PY33Z REMOVAL OF INFUSION DEVICE FROM UPPER VEIN, PERCUTANEOUS APPROACH: ICD-10-PCS | Performed by: RADIOLOGY

## 2025-07-21 PROCEDURE — 0JH63XZ INSERTION OF TUNNELED VASCULAR ACCESS DEVICE INTO CHEST SUBCUTANEOUS TISSUE AND FASCIA, PERCUTANEOUS APPROACH: ICD-10-PCS | Performed by: RADIOLOGY

## 2025-07-21 PROCEDURE — 6370000000 HC RX 637 (ALT 250 FOR IP): Performed by: PHYSICIAN ASSISTANT

## 2025-07-21 PROCEDURE — 6370000000 HC RX 637 (ALT 250 FOR IP): Performed by: NURSE PRACTITIONER

## 2025-07-21 PROCEDURE — 99232 SBSQ HOSP IP/OBS MODERATE 35: CPT | Performed by: INTERNAL MEDICINE

## 2025-07-21 PROCEDURE — 6360000002 HC RX W HCPCS: Performed by: RADIOLOGY

## 2025-07-21 PROCEDURE — 36558 INSERT TUNNELED CV CATH: CPT

## 2025-07-21 PROCEDURE — 2060000000 HC ICU INTERMEDIATE R&B

## 2025-07-21 PROCEDURE — 85027 COMPLETE CBC AUTOMATED: CPT

## 2025-07-21 PROCEDURE — 2500000003 HC RX 250 WO HCPCS

## 2025-07-21 PROCEDURE — 36415 COLL VENOUS BLD VENIPUNCTURE: CPT

## 2025-07-21 PROCEDURE — 77001 FLUOROGUIDE FOR VEIN DEVICE: CPT

## 2025-07-21 PROCEDURE — 6360000002 HC RX W HCPCS: Performed by: INTERNAL MEDICINE

## 2025-07-21 PROCEDURE — 02H633Z INSERTION OF INFUSION DEVICE INTO RIGHT ATRIUM, PERCUTANEOUS APPROACH: ICD-10-PCS | Performed by: RADIOLOGY

## 2025-07-21 PROCEDURE — 90935 HEMODIALYSIS ONE EVALUATION: CPT

## 2025-07-21 PROCEDURE — 82948 REAGENT STRIP/BLOOD GLUCOSE: CPT

## 2025-07-21 PROCEDURE — 99233 SBSQ HOSP IP/OBS HIGH 50: CPT | Performed by: STUDENT IN AN ORGANIZED HEALTH CARE EDUCATION/TRAINING PROGRAM

## 2025-07-21 PROCEDURE — 6370000000 HC RX 637 (ALT 250 FOR IP)

## 2025-07-21 RX ORDER — AMLODIPINE BESYLATE 5 MG/1
5 TABLET ORAL DAILY
Status: DISCONTINUED | OUTPATIENT
Start: 2025-07-21 | End: 2025-07-24 | Stop reason: HOSPADM

## 2025-07-21 RX ORDER — LIDOCAINE HYDROCHLORIDE 20 MG/ML
INJECTION, SOLUTION INFILTRATION; PERINEURAL PRN
Status: COMPLETED | OUTPATIENT
Start: 2025-07-21 | End: 2025-07-21

## 2025-07-21 RX ORDER — DOXAZOSIN 4 MG/1
4 TABLET ORAL EVERY 12 HOURS SCHEDULED
Status: DISCONTINUED | OUTPATIENT
Start: 2025-07-21 | End: 2025-07-24 | Stop reason: HOSPADM

## 2025-07-21 RX ORDER — MIDAZOLAM HYDROCHLORIDE 1 MG/ML
INJECTION, SOLUTION INTRAMUSCULAR; INTRAVENOUS PRN
Status: COMPLETED | OUTPATIENT
Start: 2025-07-21 | End: 2025-07-21

## 2025-07-21 RX ADMIN — ONDANSETRON 4 MG: 2 INJECTION, SOLUTION INTRAMUSCULAR; INTRAVENOUS at 17:22

## 2025-07-21 RX ADMIN — WATER 2000 MG: 1 INJECTION INTRAMUSCULAR; INTRAVENOUS; SUBCUTANEOUS at 13:51

## 2025-07-21 RX ADMIN — AMLODIPINE BESYLATE 5 MG: 5 TABLET ORAL at 13:20

## 2025-07-21 RX ADMIN — ACETAMINOPHEN 650 MG: 325 TABLET ORAL at 20:22

## 2025-07-21 RX ADMIN — HYDRALAZINE HYDROCHLORIDE 100 MG: 50 TABLET ORAL at 16:06

## 2025-07-21 RX ADMIN — HYDRALAZINE HYDROCHLORIDE 100 MG: 50 TABLET ORAL at 07:03

## 2025-07-21 RX ADMIN — DOXAZOSIN 4 MG: 4 TABLET ORAL at 20:22

## 2025-07-21 RX ADMIN — SODIUM CHLORIDE, PRESERVATIVE FREE 10 ML: 5 INJECTION INTRAVENOUS at 11:44

## 2025-07-21 RX ADMIN — CARVEDILOL 25 MG: 25 TABLET, FILM COATED ORAL at 11:43

## 2025-07-21 RX ADMIN — HYDROMORPHONE HYDROCHLORIDE 1 MG: 1 INJECTION, SOLUTION INTRAMUSCULAR; INTRAVENOUS; SUBCUTANEOUS at 14:36

## 2025-07-21 RX ADMIN — MIDAZOLAM 1 MG: 1 INJECTION INTRAMUSCULAR; INTRAVENOUS at 14:36

## 2025-07-21 RX ADMIN — LIDOCAINE HYDROCHLORIDE 4 ML: 20 INJECTION, SOLUTION INFILTRATION; PERINEURAL at 14:41

## 2025-07-21 RX ADMIN — INSULIN LISPRO 2 UNITS: 100 INJECTION, SOLUTION INTRAVENOUS; SUBCUTANEOUS at 20:21

## 2025-07-21 RX ADMIN — HYDRALAZINE HYDROCHLORIDE 100 MG: 50 TABLET ORAL at 21:37

## 2025-07-21 RX ADMIN — CARVEDILOL 25 MG: 25 TABLET, FILM COATED ORAL at 18:00

## 2025-07-21 RX ADMIN — EPOETIN ALFA-EPBX 2000 UNITS: 4000 INJECTION, SOLUTION INTRAVENOUS; SUBCUTANEOUS at 18:04

## 2025-07-21 RX ADMIN — DIPHENHYDRAMINE HYDROCHLORIDE 25 MG: 50 INJECTION INTRAMUSCULAR; INTRAVENOUS at 21:37

## 2025-07-21 ASSESSMENT — PAIN SCALES - GENERAL
PAINLEVEL_OUTOF10: 5
PAINLEVEL_OUTOF10: 0

## 2025-07-21 ASSESSMENT — PAIN DESCRIPTION - LOCATION: LOCATION: CHEST

## 2025-07-21 NOTE — H&P
[facility]  Sedation/Analgesia History & Physical    Pt Name: Pedro Sotelo  MRN: [id number]  YOB: 1985  Provider Performing Procedure: Nixon Winters MD, MD  Primary Care Physician: Nga Martines DO    Formulation and discussion of sedation / procedure plans, risks, benefits, side effects and alternatives with patient and/or responsible adult completed.    PRE-PROCEDURE   DNR-CCA/DNR-CC []Yes [x]No  Brief History/Pre-Procedure Diagnosis: Renal failure          MEDICAL HISTORY  []CAD/Valve  []Liver Disease  []Lung Disease []Diabetes  []Hypertension []Renal Disease  [x]Additional information:       has a past medical history of Asthma, Diabetes mellitus (HCC), Diabetic ketoacidosis associated with type 1 diabetes mellitus (HCC), Head injury, Hyperlipidemia, Hypertension, Male erectile dysfunction, Migraine, and Neuropathy in diabetes (HCC).    SURGICAL HISTORY   has a past surgical history that includes Appendectomy; Tympanostomy tube placement; Pars plana vitrectomy (03/09/2023); and Pars plana vitrectomy (04/27/2023).  Additional information:       ALLERGIES   Allergies as of 07/14/2025 - Fully Reviewed 07/14/2025   Allergen Reaction Noted    Bee venom Hives 07/12/2017    Vancomycin Other (See Comments) 12/30/2020    Lipitor [atorvastatin] Hives 09/04/2014     Additional information:       MEDICATIONS   Coumadin Use Last 5 Days [x]No []Yes  Antiplatelet drug therapy use last 5 days  [x]No []Yes  Other anticoagulant use last 5 days  [x]No []Yes    Current Facility-Administered Medications:     doxazosin (CARDURA) tablet 4 mg, 4 mg, Oral, 2 times per day, Gareth Camacho MD    amLODIPine (NORVASC) tablet 5 mg, 5 mg, Oral, Daily, Gareth Camacho MD, 5 mg at 07/21/25 1320    [Held by provider] heparin (porcine) injection 5,000 Units, 5,000 Units, SubCUTAneous, 3 times per day, Valerie Moody APRN - CNP, 5,000 Units at 07/20/25 2132    epoetin xuan-epbx (RETACRIT) 6,000 Units

## 2025-07-21 NOTE — FLOWSHEET NOTE
07/21/25 1130   Vital Signs   /84   Temp 98 °F (36.7 °C)   Pulse 79   Respirations 16   SpO2 96 %   Weight - Scale 99.9 kg (220 lb 3.8 oz)   Weight Method Bed scale   Percent Weight Change -0.99   Pain Assessment   Pain Assessment None - Denies Pain   Post-Hemodialysis Assessment   Post-Treatment Procedures Blood returned;Catheter Capped, clamped with Saline x2 ports   Machine Disinfection Process Acid/Vinegar Clean;Heat Disinfect;Exterior Machine Disinfection   Rinseback Volume (ml) 400 ml   Blood Volume Processed (Liters) 50.8 L   Dialyzer Clearance Lightly streaked   Duration of Treatment (minutes) 180 minutes   Hemodialysis Intake (ml) 400 ml   Hemodialysis Output (ml) 1400 ml   NET Removed (ml) 1000   Tolerated Treatment Good     completed 3 hours HD TX and removed 2 Liters of fluid. pt tolerated Dialysis TX well. HD TX ended, all blood returned with 400 mls rinse back. removed X2 (16g Needles), applied sterile gauze with light pressure for less than 10 min. pt returned to her room with left, upper arm dressing clean, dry and intact. report given to primary nurse. record completed and printed to be scanned into pt's EMR.

## 2025-07-21 NOTE — OP NOTE
Department of Radiology  Post Procedure Progress Note      Pre-Procedure Diagnosis:  Renal Failure    Procedure Performed: Dialysis Catheter insertion     Anesthesia: local , versed and dilaudid    Findings: successful    Immediate Complications:  None    Estimated Blood Loss: minimal    SEE DICTATED PROCEDURE NOTE FOR COMPLETE DETAILS.      Electronically signed by Nixon Winters MD on 7/21/2025 at 3:20 PM

## 2025-07-22 ENCOUNTER — APPOINTMENT (OUTPATIENT)
Dept: CT IMAGING | Age: 40
DRG: 280 | End: 2025-07-22
Attending: STUDENT IN AN ORGANIZED HEALTH CARE EDUCATION/TRAINING PROGRAM
Payer: COMMERCIAL

## 2025-07-22 LAB
ANION GAP SERPL CALC-SCNC: 10 MEQ/L (ref 8–16)
BUN SERPL-MCNC: 22 MG/DL (ref 8–23)
CALCIUM SERPL-MCNC: 8 MG/DL (ref 8.6–10)
CHLORIDE SERPL-SCNC: 100 MEQ/L (ref 98–111)
CO2 SERPL-SCNC: 26 MEQ/L (ref 22–29)
CREAT SERPL-MCNC: 4.8 MG/DL (ref 0.7–1.2)
DEPRECATED RDW RBC AUTO: 42.5 FL (ref 35–45)
EKG ATRIAL RATE: 73 BPM
EKG P AXIS: 36 DEGREES
EKG P-R INTERVAL: 124 MS
EKG Q-T INTERVAL: 410 MS
EKG QRS DURATION: 108 MS
EKG QTC CALCULATION (BAZETT): 451 MS
EKG R AXIS: 36 DEGREES
EKG T AXIS: 105 DEGREES
EKG VENTRICULAR RATE: 73 BPM
ERYTHROCYTE [DISTWIDTH] IN BLOOD BY AUTOMATED COUNT: 11.7 % (ref 11.5–14.5)
GFR SERPL CREATININE-BSD FRML MDRD: 15 ML/MIN/1.73M2
GLUCOSE BLD STRIP.AUTO-MCNC: 157 MG/DL (ref 70–108)
GLUCOSE BLD STRIP.AUTO-MCNC: 158 MG/DL (ref 70–108)
GLUCOSE BLD STRIP.AUTO-MCNC: 176 MG/DL (ref 70–108)
GLUCOSE BLD STRIP.AUTO-MCNC: 243 MG/DL (ref 70–108)
GLUCOSE BLD STRIP.AUTO-MCNC: 299 MG/DL (ref 70–108)
GLUCOSE SERPL-MCNC: 188 MG/DL (ref 74–109)
HCT VFR BLD AUTO: 24.3 % (ref 42–52)
HGB BLD-MCNC: 8 GM/DL (ref 14–18)
MAGNESIUM SERPL-MCNC: 2.2 MG/DL (ref 1.6–2.6)
MCH RBC QN AUTO: 33.1 PG (ref 26–33)
MCHC RBC AUTO-ENTMCNC: 32.9 GM/DL (ref 32.2–35.5)
MCV RBC AUTO: 100.4 FL (ref 80–94)
PLATELET # BLD AUTO: 254 THOU/MM3 (ref 130–400)
PMV BLD AUTO: 10 FL (ref 9.4–12.4)
POTASSIUM SERPL-SCNC: 3.7 MEQ/L (ref 3.5–5.2)
RBC # BLD AUTO: 2.42 MILL/MM3 (ref 4.7–6.1)
SODIUM SERPL-SCNC: 136 MEQ/L (ref 135–145)
TROPONIN, HIGH SENSITIVITY: 119 NG/L (ref 0–12)
TROPONIN, HIGH SENSITIVITY: 127 NG/L (ref 0–12)
WBC # BLD AUTO: 7.5 THOU/MM3 (ref 4.8–10.8)

## 2025-07-22 PROCEDURE — 0TB03ZX EXCISION OF RIGHT KIDNEY, PERCUTANEOUS APPROACH, DIAGNOSTIC: ICD-10-PCS | Performed by: RADIOLOGY

## 2025-07-22 PROCEDURE — 99233 SBSQ HOSP IP/OBS HIGH 50: CPT | Performed by: STUDENT IN AN ORGANIZED HEALTH CARE EDUCATION/TRAINING PROGRAM

## 2025-07-22 PROCEDURE — 6370000000 HC RX 637 (ALT 250 FOR IP): Performed by: NURSE PRACTITIONER

## 2025-07-22 PROCEDURE — 82948 REAGENT STRIP/BLOOD GLUCOSE: CPT

## 2025-07-22 PROCEDURE — 84484 ASSAY OF TROPONIN QUANT: CPT

## 2025-07-22 PROCEDURE — 2060000000 HC ICU INTERMEDIATE R&B

## 2025-07-22 PROCEDURE — 6370000000 HC RX 637 (ALT 250 FOR IP): Performed by: INTERNAL MEDICINE

## 2025-07-22 PROCEDURE — 88305 TISSUE EXAM BY PATHOLOGIST: CPT

## 2025-07-22 PROCEDURE — 88350 IMFLUOR EA ADDL 1ANTB STN PX: CPT

## 2025-07-22 PROCEDURE — 93005 ELECTROCARDIOGRAM TRACING: CPT | Performed by: STUDENT IN AN ORGANIZED HEALTH CARE EDUCATION/TRAINING PROGRAM

## 2025-07-22 PROCEDURE — 6370000000 HC RX 637 (ALT 250 FOR IP)

## 2025-07-22 PROCEDURE — 6360000002 HC RX W HCPCS: Performed by: RADIOLOGY

## 2025-07-22 PROCEDURE — 88313 SPECIAL STAINS GROUP 2: CPT

## 2025-07-22 PROCEDURE — 88348 ELECTRON MICROSCOPY DX: CPT

## 2025-07-22 PROCEDURE — 99232 SBSQ HOSP IP/OBS MODERATE 35: CPT | Performed by: INTERNAL MEDICINE

## 2025-07-22 PROCEDURE — 77012 CT SCAN FOR NEEDLE BIOPSY: CPT

## 2025-07-22 PROCEDURE — 80048 BASIC METABOLIC PNL TOTAL CA: CPT

## 2025-07-22 PROCEDURE — 2500000003 HC RX 250 WO HCPCS

## 2025-07-22 PROCEDURE — 83735 ASSAY OF MAGNESIUM: CPT

## 2025-07-22 PROCEDURE — 6360000002 HC RX W HCPCS: Performed by: PHYSICIAN ASSISTANT

## 2025-07-22 PROCEDURE — 85027 COMPLETE CBC AUTOMATED: CPT

## 2025-07-22 PROCEDURE — 6370000000 HC RX 637 (ALT 250 FOR IP): Performed by: PHYSICIAN ASSISTANT

## 2025-07-22 PROCEDURE — 36415 COLL VENOUS BLD VENIPUNCTURE: CPT

## 2025-07-22 PROCEDURE — 88346 IMFLUOR 1ST 1ANTB STAIN PX: CPT

## 2025-07-22 PROCEDURE — 93010 ELECTROCARDIOGRAM REPORT: CPT | Performed by: INTERNAL MEDICINE

## 2025-07-22 RX ORDER — MIDAZOLAM HYDROCHLORIDE 1 MG/ML
INJECTION, SOLUTION INTRAMUSCULAR; INTRAVENOUS PRN
Status: COMPLETED | OUTPATIENT
Start: 2025-07-22 | End: 2025-07-22

## 2025-07-22 RX ORDER — MIDAZOLAM HYDROCHLORIDE 1 MG/ML
1 INJECTION, SOLUTION INTRAMUSCULAR; INTRAVENOUS ONCE
Status: CANCELLED | OUTPATIENT
Start: 2025-07-22 | End: 2025-07-22

## 2025-07-22 RX ORDER — FENTANYL CITRATE 50 UG/ML
50 INJECTION, SOLUTION INTRAMUSCULAR; INTRAVENOUS ONCE
Refills: 0 | Status: CANCELLED | OUTPATIENT
Start: 2025-07-22 | End: 2025-07-22

## 2025-07-22 RX ORDER — SODIUM CHLORIDE 450 MG/100ML
INJECTION, SOLUTION INTRAVENOUS CONTINUOUS
Status: CANCELLED | OUTPATIENT
Start: 2025-07-22

## 2025-07-22 RX ADMIN — INSULIN LISPRO 2 UNITS: 100 INJECTION, SOLUTION INTRAVENOUS; SUBCUTANEOUS at 20:37

## 2025-07-22 RX ADMIN — CARVEDILOL 25 MG: 25 TABLET, FILM COATED ORAL at 08:06

## 2025-07-22 RX ADMIN — HYDROMORPHONE HYDROCHLORIDE 1 MG: 1 INJECTION, SOLUTION INTRAMUSCULAR; INTRAVENOUS; SUBCUTANEOUS at 10:40

## 2025-07-22 RX ADMIN — DOXAZOSIN 4 MG: 4 TABLET ORAL at 08:06

## 2025-07-22 RX ADMIN — HYDRALAZINE HYDROCHLORIDE 100 MG: 50 TABLET ORAL at 21:59

## 2025-07-22 RX ADMIN — AMLODIPINE BESYLATE 5 MG: 5 TABLET ORAL at 08:06

## 2025-07-22 RX ADMIN — DIPHENHYDRAMINE HYDROCHLORIDE 25 MG: 50 INJECTION INTRAMUSCULAR; INTRAVENOUS at 21:58

## 2025-07-22 RX ADMIN — DOXAZOSIN 4 MG: 4 TABLET ORAL at 20:38

## 2025-07-22 RX ADMIN — MIDAZOLAM 1 MG: 1 INJECTION INTRAMUSCULAR; INTRAVENOUS at 10:40

## 2025-07-22 RX ADMIN — MIDAZOLAM 0.5 MG: 1 INJECTION INTRAMUSCULAR; INTRAVENOUS at 11:31

## 2025-07-22 RX ADMIN — HYDROMORPHONE HYDROCHLORIDE 0.5 MG: 1 INJECTION, SOLUTION INTRAMUSCULAR; INTRAVENOUS; SUBCUTANEOUS at 11:31

## 2025-07-22 RX ADMIN — INSULIN LISPRO 4 UNITS: 100 INJECTION, SOLUTION INTRAVENOUS; SUBCUTANEOUS at 17:54

## 2025-07-22 RX ADMIN — HYDRALAZINE HYDROCHLORIDE 100 MG: 50 TABLET ORAL at 06:16

## 2025-07-22 RX ADMIN — SODIUM CHLORIDE, PRESERVATIVE FREE 10 ML: 5 INJECTION INTRAVENOUS at 08:08

## 2025-07-22 RX ADMIN — ACETAMINOPHEN 650 MG: 325 TABLET ORAL at 21:58

## 2025-07-22 ASSESSMENT — PAIN SCALES - GENERAL
PAINLEVEL_OUTOF10: 2
PAINLEVEL_OUTOF10: 0

## 2025-07-22 NOTE — CARE COORDINATION
7/22/25, 3:12 PM EDT    DISCHARGE ON GOING EVALUATION    [facility] day: 7  Location: -07/007-A Reason for admit: Hypertensive crisis [I16.9]     Procedures:   7/15 Echo with EF 50-55%   7/18 TESSIO - 7/21 removed  7/18 New HD  7/21 Tunneled Dialysis Catheter: RIJ  7/22 Kidney Biopsy    Imaging since last note:   7/16 MBS: Laryngeal penetration was observed with thin barium. There was no tracheal aspiration.    Barriers to Discharge: TESSIO placed and new HD started on 7/18 with 1L removed. HD again on 7/19 with 1L removed. HD on 7/21 with 1L removed. Tunneled dialysis catheter placed yesterday in IR and TESSIO removed. Kidney biopsy done today. CM asked to set up new OP HD for patient in [place]; referral initiated via [employer].     Afebrile. NSR. On room air. Ox4. SLP. Hospitalist and Nephrology following. Telemetry, tunneled dialysis catheter, neuro checks. Norvasc, coreg, prn IV benadryl, cardura, sq retacrit, po hydralazine, hydrocortisone cream - chest, SSI, prn zofran. Creat 4.8, hgb 8.     PCP: Nga Martines DO  Readmission Risk Score: 15.4    Patient Goals/Plan/Treatment Preferences: Home w/wife. New HD at [facility]; tentative schedule is TRS at 5:35 AM - awaiting medical clearance from [facility] [place]. [place] [facility] [place] Coordinator #: [phone].     CM spoke with patient and his wife; he verified that this schedule at [facility] will work. CM messaged [facility] back per [employer] portal to confirm as requested.     1523 CM just received confirmation from [facility] that pt has been medically cleared to start treatment at their facility on Thursday 7/24. TRS schedule with 0535 chair time. Dr Camacho and Dr Milligan updated via [employer].

## 2025-07-22 NOTE — H&P
Formulation and discussion of sedation / procedure plans, risks, benefits, side effects and alternatives with patient and/or responsible adult completed.    History and Physical reviewed and unchanged.    Electronically signed by Bear Sharpe MD on 7/22/25 at 10:40 AM EDT

## 2025-07-22 NOTE — OP NOTE
Department of Radiology  Post Procedure Progress Note      Pre-Procedure Diagnosis:  medical renal disease     Procedure Performed:  CT guided renal biopsy     Anesthesia: local / versed and fentanyl    Findings: successful    Immediate Complications:  None    Estimated Blood Loss: minimal    SEE DICTATED PROCEDURE NOTE FOR COMPLETE DETAILS.    Bear Sharpe MD   7/22/2025 10:58 AM

## 2025-07-22 NOTE — H&P
[facility]  Sedation/Analgesia History & Physical    Pt Name: Pedro Sotelo  MRN: [id number]  YOB: 1985  Provider Performing Procedure: Bear Sharpe MD, MD  Primary Care Physician: Nga Martines DO    Formulation and discussion of sedation / procedure plans, risks, benefits, side effects and alternatives with patient and/or responsible adult completed.    PRE-PROCEDURE   DNR-CCA/DNR-CC []Yes [x]No  Brief History/Pre-Procedure Diagnosis: medical renal disease           MEDICAL HISTORY  []CAD/Valve  []Liver Disease  []Lung Disease []Diabetes  []Hypertension []Renal Disease  []Additional information:       has a past medical history of Asthma, Diabetes mellitus (HCC), Diabetic ketoacidosis associated with type 1 diabetes mellitus (HCC), Head injury, Hyperlipidemia, Hypertension, Male erectile dysfunction, Migraine, and Neuropathy in diabetes (HCC).    SURGICAL HISTORY   has a past surgical history that includes Appendectomy; Tympanostomy tube placement; Pars plana vitrectomy (03/09/2023); and Pars plana vitrectomy (04/27/2023).  Additional information:       ALLERGIES   Allergies as of 07/14/2025 - Fully Reviewed 07/14/2025   Allergen Reaction Noted    Bee venom Hives 07/12/2017    Vancomycin Other (See Comments) 12/30/2020    Lipitor [atorvastatin] Hives 09/04/2014     Additional information:       MEDICATIONS   Coumadin Use Last 5 Days [x]No []Yes  Antiplatelet drug therapy use last 5 days  [x]No []Yes  Other anticoagulant use last 5 days  [x]No []Yes    Current Facility-Administered Medications:     doxazosin (CARDURA) tablet 4 mg, 4 mg, Oral, 2 times per day, Gareth Camacho MD, 4 mg at 07/22/25 0806    amLODIPine (NORVASC) tablet 5 mg, 5 mg, Oral, Daily, Gareth Camacho MD, 5 mg at 07/22/25 0806    [Held by provider] heparin (porcine) injection 5,000 Units, 5,000 Units, SubCUTAneous, 3 times per day, Valerie Moody APRN - CNP, 5,000 Units at 07/20/25 2132    epoetin

## 2025-07-22 NOTE — PROCEDURES
PROCEDURE NOTE  Date: 7/22/2025   Name: Pedro Sotelo  YOB: 1985    Procedures  12 lead EKG completed. Results handed to Carolina RODRIGUEZ.

## 2025-07-23 ENCOUNTER — APPOINTMENT (OUTPATIENT)
Dept: INTERVENTIONAL RADIOLOGY/VASCULAR | Age: 40
DRG: 280 | End: 2025-07-23
Attending: STUDENT IN AN ORGANIZED HEALTH CARE EDUCATION/TRAINING PROGRAM
Payer: COMMERCIAL

## 2025-07-23 PROBLEM — I45.5 SINUS PAUSE: Status: ACTIVE | Noted: 2025-07-23

## 2025-07-23 PROBLEM — R55 SYNCOPE: Status: ACTIVE | Noted: 2025-07-23

## 2025-07-23 LAB
ANION GAP SERPL CALC-SCNC: 12 MEQ/L (ref 8–16)
BUN SERPL-MCNC: 30 MG/DL (ref 8–23)
CALCIUM SERPL-MCNC: 8.2 MG/DL (ref 8.6–10)
CHLORIDE SERPL-SCNC: 101 MEQ/L (ref 98–111)
CO2 SERPL-SCNC: 25 MEQ/L (ref 22–29)
CREAT SERPL-MCNC: 6 MG/DL (ref 0.7–1.2)
ECHO BSA: 2.28 M2
GFR SERPL CREATININE-BSD FRML MDRD: 11 ML/MIN/1.73M2
GLUCOSE BLD STRIP.AUTO-MCNC: 157 MG/DL (ref 70–108)
GLUCOSE BLD STRIP.AUTO-MCNC: 189 MG/DL (ref 70–108)
GLUCOSE BLD STRIP.AUTO-MCNC: 196 MG/DL (ref 70–108)
GLUCOSE BLD STRIP.AUTO-MCNC: 209 MG/DL (ref 70–108)
GLUCOSE SERPL-MCNC: 180 MG/DL (ref 74–109)
POTASSIUM SERPL-SCNC: 3.9 MEQ/L (ref 3.5–5.2)
SODIUM SERPL-SCNC: 138 MEQ/L (ref 135–145)
TROPONIN, HIGH SENSITIVITY: 120 NG/L (ref 0–12)

## 2025-07-23 PROCEDURE — 99233 SBSQ HOSP IP/OBS HIGH 50: CPT | Performed by: STUDENT IN AN ORGANIZED HEALTH CARE EDUCATION/TRAINING PROGRAM

## 2025-07-23 PROCEDURE — 6370000000 HC RX 637 (ALT 250 FOR IP): Performed by: INTERNAL MEDICINE

## 2025-07-23 PROCEDURE — 2060000000 HC ICU INTERMEDIATE R&B

## 2025-07-23 PROCEDURE — 93005 ELECTROCARDIOGRAM TRACING: CPT | Performed by: STUDENT IN AN ORGANIZED HEALTH CARE EDUCATION/TRAINING PROGRAM

## 2025-07-23 PROCEDURE — 90935 HEMODIALYSIS ONE EVALUATION: CPT

## 2025-07-23 PROCEDURE — 80048 BASIC METABOLIC PNL TOTAL CA: CPT

## 2025-07-23 PROCEDURE — 99223 1ST HOSP IP/OBS HIGH 75: CPT | Performed by: INTERNAL MEDICINE

## 2025-07-23 PROCEDURE — 6370000000 HC RX 637 (ALT 250 FOR IP): Performed by: NURSE PRACTITIONER

## 2025-07-23 PROCEDURE — 99232 SBSQ HOSP IP/OBS MODERATE 35: CPT | Performed by: INTERNAL MEDICINE

## 2025-07-23 PROCEDURE — 6370000000 HC RX 637 (ALT 250 FOR IP): Performed by: PHYSICIAN ASSISTANT

## 2025-07-23 PROCEDURE — 93880 EXTRACRANIAL BILAT STUDY: CPT

## 2025-07-23 PROCEDURE — 2500000003 HC RX 250 WO HCPCS

## 2025-07-23 PROCEDURE — 6360000002 HC RX W HCPCS

## 2025-07-23 PROCEDURE — 82948 REAGENT STRIP/BLOOD GLUCOSE: CPT

## 2025-07-23 PROCEDURE — 36415 COLL VENOUS BLD VENIPUNCTURE: CPT

## 2025-07-23 PROCEDURE — 6360000002 HC RX W HCPCS: Performed by: INTERNAL MEDICINE

## 2025-07-23 PROCEDURE — 6360000002 HC RX W HCPCS: Performed by: PHYSICIAN ASSISTANT

## 2025-07-23 PROCEDURE — 84484 ASSAY OF TROPONIN QUANT: CPT

## 2025-07-23 RX ADMIN — HYDRALAZINE HYDROCHLORIDE 100 MG: 50 TABLET ORAL at 21:29

## 2025-07-23 RX ADMIN — HYDRALAZINE HYDROCHLORIDE 100 MG: 50 TABLET ORAL at 06:54

## 2025-07-23 RX ADMIN — DIPHENHYDRAMINE HYDROCHLORIDE 25 MG: 50 INJECTION INTRAMUSCULAR; INTRAVENOUS at 21:30

## 2025-07-23 RX ADMIN — INSULIN LISPRO 2 UNITS: 100 INJECTION, SOLUTION INTRAVENOUS; SUBCUTANEOUS at 17:56

## 2025-07-23 RX ADMIN — EPOETIN ALFA-EPBX 6000 UNITS: 4000 INJECTION, SOLUTION INTRAVENOUS; SUBCUTANEOUS at 18:01

## 2025-07-23 RX ADMIN — ONDANSETRON 4 MG: 2 INJECTION, SOLUTION INTRAMUSCULAR; INTRAVENOUS at 17:54

## 2025-07-23 RX ADMIN — DOXAZOSIN 4 MG: 4 TABLET ORAL at 21:29

## 2025-07-23 RX ADMIN — PROCHLORPERAZINE EDISYLATE 10 MG: 5 INJECTION INTRAMUSCULAR; INTRAVENOUS at 21:30

## 2025-07-23 RX ADMIN — INSULIN LISPRO 2 UNITS: 100 INJECTION, SOLUTION INTRAVENOUS; SUBCUTANEOUS at 12:52

## 2025-07-23 RX ADMIN — SODIUM CHLORIDE, PRESERVATIVE FREE 10 ML: 5 INJECTION INTRAVENOUS at 21:30

## 2025-07-23 RX ADMIN — HYDRALAZINE HYDROCHLORIDE 100 MG: 50 TABLET ORAL at 12:56

## 2025-07-23 ASSESSMENT — PAIN SCALES - GENERAL: PAINLEVEL_OUTOF10: 0

## 2025-07-23 NOTE — PLAN OF CARE
Problem: Chronic Conditions and Co-morbidities  Goal: Patient's chronic conditions and co-morbidity symptoms are monitored and maintained or improved  Outcome: Progressing     Problem: Discharge Planning  Goal: Discharge to home or other facility with appropriate resources  Outcome: Progressing     Problem: Pain  Goal: Verbalizes/displays adequate comfort level or baseline comfort level  Outcome: Progressing     Problem: Safety - Adult  Goal: Free from fall injury  Outcome: Progressing       Problem: ABCDS Injury Assessment  Goal: Absence of physical injury  Outcome: Progressing     Problem: Skin/Tissue Integrity  Goal: Skin integrity remains intact  Description: 1.  Monitor for areas of redness and/or skin breakdown  2.  Assess vascular access sites hourly  3.  Every 4-6 hours minimum:  Change oxygen saturation probe site  4.  Every 4-6 hours:  If on nasal continuous positive airway pressure, respiratory therapy assess nares and determine need for appliance change or resting period  Outcome: Progressing     Problem: Metabolic/Fluid and Electrolytes - Adult  Goal: Electrolytes maintained within normal limits  Outcome: Progressing     Problem: Metabolic/Fluid and Electrolytes - Adult  Goal: Hemodynamic stability and optimal renal function maintained  Outcome: Progressing     Problem: Metabolic/Fluid and Electrolytes - Adult  Goal: Glucose maintained within prescribed range  Outcome: Progressing

## 2025-07-23 NOTE — FLOWSHEET NOTE
07/23/25 1122   Vital Signs   BP (!) 158/97   Temp 98.7 °F (37.1 °C)   Pulse 79   Respirations 19   Weight - Scale 97.9 kg (215 lb 13.3 oz)   Weight Method Bed scale   Percent Weight Change -2   Post-Hemodialysis Assessment   Post-Treatment Procedures Blood returned;Catheter Capped, clamped with Saline x2 ports   Machine Disinfection Process Acid/Vinegar Clean;Heat Disinfect;Exterior Machine Disinfection   Rinseback Volume (ml) 400 ml   Blood Volume Processed (Liters) 52.8 L   Dialyzer Clearance Lightly streaked   Duration of Treatment (minutes) 180 minutes   Heparin Amount Administered During Treatment (mL) 0 mL   Hemodialysis Intake (ml) 400 ml   Hemodialysis Output (ml) 2400 ml   NET Removed (ml) 2000     Stable 3 hour treatment complete with 2 liters of fluid removed. Tolerated well. HD catheter ports flushed, clamped and capped with tegos and alcohol caps. Dressing clean and dry. Report given to primary RN. Treatment record printed to be scanned into EMR.

## 2025-07-23 NOTE — CONSULTS
[facility]'s  Consult    Patient's Name/Date of Birth: Pedro Sotelo / 1985 (40 y.o.)    Date: July 23, 2025     Referring Provider: Ernst Milligan DO    CHIEF COMPLAINT: Dysphagia/Headache    Reason of consultation- sinus pause and unresponsiveness post procedure      HPI: 40 y.o. male with PMHx of HTN, DM1, GERD who presented on 7/14 initially w chief complaint of difficulty swallowing and headaches for several months. Pt was evaluated at ED, was found  and labs remarkable for WARNER; Cr 6.4 (baseline 1.5);  Trop and BNP were elevated. Pt was started on cardene and admitted of Hypertensive Emergency and WARNER management. Pt's Trop trended flat 145x2; his proBNP 01786. Pt had been monitored for ACS since admission, there has been no concerns for ACS.     HD catheter was placed 7/18 and patient underwent temporary dialysis gradual improvement in renal function.  Renal US showed elevated resistive index on the left side.  7/21 tunneled dialysis catheter was placed.   Underwent CT-guided renal biopsy 7/22. After the procedure the patient did have an episode of passing out for a few seconds, with telemetry showing a 3.32-second pause. Subsequent EKG showed NSR with no AVB or prolonged QT. Beta-blocker co0reg 25 BID was held. Electrolytes were reviewed. The patient was asymptomatic afterwards.     On 7/22 Patient was noted to have single event of sinus pause and unresponsiveness lasting few seconds. Telemetry showed 3.32 secs pause. Subsequent EKG w NSR with no AVB or prolonged QT. Patient's BB (Coreg) was held; Cardiology was consulted for evaluation.      Hence cardiology eval sought    Echo: No results found for this or any previous visit.     All labs, EKG's, diagnostic testing and images as well as cardiac cath, stress testing were reviewed during this encounter    Past Medical History:   Diagnosis Date    Asthma     Diabetes mellitus (HCC)     type 1    Diabetic ketoacidosis

## 2025-07-23 NOTE — CARE COORDINATION
07/23/25 2:41 PM    0930 [facility] dialysis welcome letter given to patient's wife while pt was in dialysis.     Dr Milligan states we will not discharge patient today; Cardiology to see patient before discharge - consulted this morning.     1440 CM called and spoke with Josephine at [facility] at [phone]; reported pt discharge held. CM asked if they are able to do Saturday new start, she states they can. Plan for 1st HD session at [facility] on Saturday 7/26 at 0528.

## 2025-07-23 NOTE — PROCEDURES
PROCEDURE NOTE  Date: 7/23/2025   Name: Pedro Sotelo  YOB: 1985    Procedures        EKG was completed and handed to RN

## 2025-07-24 ENCOUNTER — APPOINTMENT (OUTPATIENT)
Age: 40
DRG: 280 | End: 2025-07-24
Attending: STUDENT IN AN ORGANIZED HEALTH CARE EDUCATION/TRAINING PROGRAM
Payer: COMMERCIAL

## 2025-07-24 VITALS
BODY MASS INDEX: 31.8 KG/M2 | OXYGEN SATURATION: 96 % | DIASTOLIC BLOOD PRESSURE: 86 MMHG | SYSTOLIC BLOOD PRESSURE: 140 MMHG | HEIGHT: 69 IN | WEIGHT: 214.73 LBS | HEART RATE: 83 BPM | RESPIRATION RATE: 15 BRPM | TEMPERATURE: 98.6 F

## 2025-07-24 LAB
ANION GAP SERPL CALC-SCNC: 9 MEQ/L (ref 8–16)
BUN SERPL-MCNC: 22 MG/DL (ref 8–23)
CALCIUM SERPL-MCNC: 8.2 MG/DL (ref 8.6–10)
CHLORIDE SERPL-SCNC: 102 MEQ/L (ref 98–111)
CO2 SERPL-SCNC: 26 MEQ/L (ref 22–29)
CREAT SERPL-MCNC: 4.9 MG/DL (ref 0.7–1.2)
ECHO BSA: 2.28 M2
EKG ATRIAL RATE: 85 BPM
EKG P AXIS: 43 DEGREES
EKG P-R INTERVAL: 122 MS
EKG Q-T INTERVAL: 366 MS
EKG QRS DURATION: 106 MS
EKG QTC CALCULATION (BAZETT): 435 MS
EKG R AXIS: 60 DEGREES
EKG T AXIS: 80 DEGREES
EKG VENTRICULAR RATE: 85 BPM
GFR SERPL CREATININE-BSD FRML MDRD: 14 ML/MIN/1.73M2
GLUCOSE BLD STRIP.AUTO-MCNC: 189 MG/DL (ref 70–108)
GLUCOSE BLD STRIP.AUTO-MCNC: 246 MG/DL (ref 70–108)
GLUCOSE SERPL-MCNC: 220 MG/DL (ref 74–109)
POTASSIUM SERPL-SCNC: 4 MEQ/L (ref 3.5–5.2)
SODIUM SERPL-SCNC: 137 MEQ/L (ref 135–145)

## 2025-07-24 PROCEDURE — 2500000003 HC RX 250 WO HCPCS

## 2025-07-24 PROCEDURE — 82948 REAGENT STRIP/BLOOD GLUCOSE: CPT

## 2025-07-24 PROCEDURE — 36415 COLL VENOUS BLD VENIPUNCTURE: CPT

## 2025-07-24 PROCEDURE — 6370000000 HC RX 637 (ALT 250 FOR IP): Performed by: NURSE PRACTITIONER

## 2025-07-24 PROCEDURE — 6370000000 HC RX 637 (ALT 250 FOR IP): Performed by: INTERNAL MEDICINE

## 2025-07-24 PROCEDURE — 80048 BASIC METABOLIC PNL TOTAL CA: CPT

## 2025-07-24 PROCEDURE — 99239 HOSP IP/OBS DSCHRG MGMT >30: CPT | Performed by: STUDENT IN AN ORGANIZED HEALTH CARE EDUCATION/TRAINING PROGRAM

## 2025-07-24 PROCEDURE — 90935 HEMODIALYSIS ONE EVALUATION: CPT

## 2025-07-24 PROCEDURE — 93010 ELECTROCARDIOGRAM REPORT: CPT | Performed by: INTERNAL MEDICINE

## 2025-07-24 PROCEDURE — 99232 SBSQ HOSP IP/OBS MODERATE 35: CPT | Performed by: INTERNAL MEDICINE

## 2025-07-24 PROCEDURE — 93270 REMOTE 30 DAY ECG REV/REPORT: CPT

## 2025-07-24 PROCEDURE — 6370000000 HC RX 637 (ALT 250 FOR IP): Performed by: PHYSICIAN ASSISTANT

## 2025-07-24 RX ORDER — HYDRALAZINE HYDROCHLORIDE 100 MG/1
100 TABLET, FILM COATED ORAL EVERY 8 HOURS SCHEDULED
Qty: 90 TABLET | Refills: 3 | Status: SHIPPED | OUTPATIENT
Start: 2025-07-24

## 2025-07-24 RX ORDER — AMLODIPINE BESYLATE 5 MG/1
5 TABLET ORAL DAILY
Qty: 30 TABLET | Refills: 3 | Status: SHIPPED | OUTPATIENT
Start: 2025-07-25

## 2025-07-24 RX ORDER — BENZOCAINE/MENTHOL 6 MG-10 MG
LOZENGE MUCOUS MEMBRANE
Qty: 30 G | Refills: 1 | Status: SHIPPED | OUTPATIENT
Start: 2025-07-24 | End: 2025-07-31

## 2025-07-24 RX ORDER — DOXAZOSIN 4 MG/1
4 TABLET ORAL EVERY 12 HOURS SCHEDULED
Qty: 30 TABLET | Refills: 3 | Status: SHIPPED | OUTPATIENT
Start: 2025-07-24

## 2025-07-24 RX ADMIN — INSULIN LISPRO 4 UNITS: 100 INJECTION, SOLUTION INTRAVENOUS; SUBCUTANEOUS at 06:13

## 2025-07-24 RX ADMIN — SODIUM CHLORIDE, PRESERVATIVE FREE 10 ML: 5 INJECTION INTRAVENOUS at 07:42

## 2025-07-24 RX ADMIN — HYDRALAZINE HYDROCHLORIDE 100 MG: 50 TABLET ORAL at 15:28

## 2025-07-24 RX ADMIN — AMLODIPINE BESYLATE 5 MG: 5 TABLET ORAL at 07:42

## 2025-07-24 RX ADMIN — DOXAZOSIN 4 MG: 4 TABLET ORAL at 07:42

## 2025-07-24 RX ADMIN — HYDRALAZINE HYDROCHLORIDE 100 MG: 50 TABLET ORAL at 06:13

## 2025-07-24 NOTE — CARE COORDINATION
7/24/25, 12:03 PM EDT    Home w/wife.   Patient goals/plan/ treatment preferences discussed by  and .  Patient goals/plan/ treatment preferences reviewed with patient/ family.  Patient/ family verbalize understanding of discharge plan and are in agreement with goal/plan/treatment preferences.  Understanding was demonstrated using the teach back method.  AVS provided by RN at time of discharge, which includes all necessary medical information pertaining to the patients current course of illness, treatment, post-discharge goals of care, and treatment preferences.     Services At/After Discharge: Outpatient- New OP dialysis at [facility] [place], T/R/S schedule with 0535 chair time.

## 2025-07-24 NOTE — PROGRESS NOTES
Hospitalist Progress Note    Patient:  Pedro Sotelo      Unit/Bed:4B-05/005-A    YOB: 1985    MRN: [id number]       Acct: [id number]     PCP: Nga Martines DO    Date of Admission: 7/15/2025    Assessment/Plan:    Hypertensive emergency: Associated severe headache. Renal Insufficiency as below. CT head (-). BNP 75944 and troponin 145 with flat trend.  Started on Cardene gtt, weaned off, then restarted  Nephrology consulted  Patient started on Coreg, dose to be increased today  Oral hydralazine started by Neprhology  IV Hydralazine PRN ordered  TTE with preserved EF  PRN hydralazine added  Renal US with elevated resistive index on the left side     Acute kidney injury: Worsening (6.2 today)  Nephrology consulted and following  Renal ultrasound with elevated resistive index on the left side  Discontinue Lisinopril, Coreg and Hydralazine added for BP as above  BMP daily  Renally dose medication  Avoid nephrotoxic agents     Elevated BNP/Troponin: Likely secondary to hypertensive emergency  BNP 39634 and troponin flat at 145->145.   CT chest shows very small left pleural effusion and pulmonary interstitial edema.    TTE with preserved estimated EF of 50-55%  Monitor for signs concerning for ACS     Dysphagia/Odynophagia: Ongoing for several months  SLP eval and treat ordered, patient passed swallow eval  Recommend outpatient ENT referral, this was placed for discharge     Type 1 Diabetes Mellitus: Controlled. Last A1c 6.2 (7/9/25).   Continue home regimen- Ok to use home insulin pump but does not have it    Continue basal insulin and sliding scale insulin  Accu-cheks AC and HS  Carb controlled diet  Hypoglycemia protocol     HLD:  Continue home Statin.      GERD:   Continue Protonix.           LDA: []CVC / []PICC / []Midline / []Rodriguez / []Drains / []Mediport / [x]None  Antibiotics: No  Steroids: No  Labs: [x]Yes / []No  IVF: []Yes / [x]No     Level of care: [x]Step Down / []Med-Surg  Bed 
    Hospitalist Progress Note    Patient:  Pedro Sotelo      Unit/Bed:4B-05/005-A    YOB: 1985    MRN: [id number]       Acct: [id number]     PCP: Nga Martines DO    Date of Admission: 7/15/2025    Assessment/Plan:    Hypertensive emergency: Associated severe headache. Renal Insufficiency as below. CT head (-). BNP 35420 and troponin 145 with flat trend.  Started on Cardene gtt, weaned off, then restarted  Nephrology consulted  Patient started on Coreg, dose increased 7/16/25  Oral hydralazine started by Neprhology, increased 7/17/25  IV Hydralazine PRN ordered  TTE with preserved EF  PRN hydralazine added  Renal US with elevated resistive index on the left side     Acute kidney injury: Worsening (6.3 today)  Nephrology consulted and following  Renal ultrasound with elevated resistive index on the left side  Discontinue Lisinopril, Coreg and Hydralazine added for BP as above  BMP daily  Renally dose medication  Avoid nephrotoxic agents  Plan for renal biopsy Monday  Follow immunofixation/SPEP/ANCA, GBM, ANNELISE  Hep B surface antigen/hep C negative      Acute Headache:  Compazine, Tylenol and Bendaryl ordered  Avoid NSAIDs with renal insufficiency  Consider Droperidol  Hopefully will improve with better, consistent BP control as well    Elevated BNP/Troponin: Likely secondary to hypertensive emergency  BNP 29313 and troponin flat at 145->145.   CT chest shows very small left pleural effusion and pulmonary interstitial edema.    TTE with preserved estimated EF of 50-55%  Monitor for signs concerning for ACS     Dysphagia/Odynophagia: Ongoing for several months  SLP eval and treat ordered, patient passed swallow eval  Recommend outpatient ENT referral, this was placed for discharge     Type 1 Diabetes Mellitus: Controlled. Last A1c 6.2 (7/9/25).   Continue home regimen- Ok to use home insulin pump but does not have it    Continue basal insulin and sliding scale insulin  Accu-cheks AC and HS  Carb 
    Hospitalist Progress Note    Patient:  Pedro Sotelo      Unit/Bed:4B-07/007-A    YOB: 1985    MRN: [id number]       Acct: [id number]     PCP: Nga Martines DO    Date of Admission: 7/15/2025    Assessment/Plan:    Hypertensive emergency, resolved: Associated severe headaches, renal insufficiency-see below.  CT head negative.  BNP 55752,  with flat trend.  Echo preserved.  Renal US with elevated resisted index on the right. S/p nicardipine gtt.  Nephrology consulted, started Coreg and oral hydralazine.  IV hydralazine as needed.  Monitor BP. Escalate antihypertensives as appropriate.   WARNER, improved: Unclear etiology.  In setting of hypertensive emergency-above renal US elevated resistance index on the left side.  Held lisinopril, started Coreg and hydralazine.  Limit nephrotoxic agents.  Monitor renal function with daily BMP.  Immunofixation/SPEP/ANCA, GBM, ANNELISE ordered.  Hepatitis B and C negative.  Temporary dialysis catheter placed 7/18, subsequent improvement in renal function.  Nephrology following, plan for CT guided renal biopsy 7/21.  Acute headache: Secondary to hypertensive emergency-see above.  Continue symptomatic management.  On Compazine, Tylenol and Benadryl.  Avoid NSAIDs.  Consider droperidol if refractory.  NSTEMI: likely Type II in setting of demand secondary to hypertensive emergency and with concomitant renal insufficiency.  , x 2.  proBNP 12 867.  CT with some volume overload.  Significant renal insufficiency, HD catheter placed.  Echo EF 50 to 55%.  No signs or concerns of ACS.  Repeat HST and check EKG if CP or SOB.  Dysphagia/odynophagia: Ongoing for several months.  SLP evaluated, patient passed bedside swallow.  Plan for outpatient EGD.  Further advised outpatient ENT follow-up pending referral.  IDDMI: Left HbA1c 6.2 7/2025.  Home antibiotic regimen include insulin pump, but does not have it.  Started on medium dose SSI scale with Accu-Cheks.  
    Hospitalist Progress Note    Patient:  Pedro Sotelo      Unit/Bed:4B-07/007-A    YOB: 1985    MRN: [id number]       Acct: [id number]     PCP: Nga Martines DO    Date of Admission: 7/15/2025    Assessment/Plan:    WARNER on underlying CKDIII, vs new ESRD: Unclear etiology.  In setting of hypertensive emergency-above renal US elevated resistance index on the left side.  Held lisinopril, started Coreg and hydralazine.  Limit nephrotoxic agents.  Monitor renal function with daily BMP.  Negative GBM, ANNELISE, Immunofixation/SPEP/ANCA, GBM, ANNELISE, Hepatitis B and C.  7/18 temporary dialysis catheter, 7/21 removed and tunneled dialysis catheter placed.  Nephrology following, plan for CT guided renal biopsy 7/22.  Sinus pause: Single event, episode of unresponsiveness lasting few seconds.  Telemetry with 3.32 second pause.  Subsequent EKG NSR with no AVB or prolonged QT.   Hold BB. Check carotid US. Cardiology consulted. Event monitor on discharge.    NSTEMI: likely Type II in setting of demand secondary to hypertensive emergency and with concomitant renal insufficiency.  , x 2.  proBNP 12 867.  CT with some volume overload.  Significant renal insufficiency, HD catheter placed.  Echo EF 50 to 55%.  No signs or concerns of ACS.  Repeat HST and check EKG if CP or SOB.  Dysphagia/odynophagia: Ongoing for several months.  SLP evaluated, patient passed bedside swallow.  Plan for outpatient EGD.  Further advised outpatient ENT follow-up pending referral.  IDDMI: Left HbA1c 6.2 7/2025.  Home antibiotic regimen include insulin pump, but does not have it.  Started on medium dose SSI scale with Accu-Cheks.  Monitor blood glucose with daily BMP.  Adjust insulin accordingly.  Hypoglycemic protocols initiated.  HLD: Per chart review. Continue simvastatin at this  GERD: On PPI at home.  Hypertensive emergency, resolved: Associated severe headaches, renal insufficiency-see below.  CT head negative.  BNP 82571, 
    Hospitalist Progress Note    Patient:  Pedro Sotelo      Unit/Bed:4B-07/007-A    YOB: 1985    MRN: [id number]       Acct: [id number]     PCP: Nga Martines DO    Date of Admission: 7/15/2025    Assessment/Plan:    Hypertensive emergency: Resolved. Associated severe headache. Renal Insufficiency as below. CT head (-). BNP 07901 and troponin 145 with flat trend.  Started on Cardene gtt, weaned off 7/17  Nephrology consulted  Patient started on Coreg, dose increased 7/16/25  Oral hydralazine started by Neprhology, increased 7/17/25  IV Hydralazine PRN ordered  TTE with preserved EF  Renal US with elevated resistive index on the left side     Acute kidney injury: Improved (5.5 today)  Nephrology consulted and following  Renal ultrasound with elevated resistive index on the left side  Discontinue Lisinopril, Coreg and Hydralazine added for BP as above  BMP daily  Renally dose medication  Avoid nephrotoxic agents  Plan for renal biopsy Monday  Follow immunofixation/SPEP/ANCA, GBM, ANNELISE  Hep B surface antigen/hep C negative   7/18/25: Patient to get temporary dialysis catheter placed in IR today, to then go for dialysis thereafter.  7/19/25: Creatinine improved after dialysis yesterday today     Acute Headache:  Compazine, Tylenol and Bendaryl ordered  Avoid NSAIDs with renal insufficiency  Consider Droperidol  Hopefully will improve with better, consistent BP control as well    Elevated BNP/Troponin: Likely secondary to hypertensive emergency  BNP 28775 and troponin flat at 145->145.   CT chest shows very small left pleural effusion and pulmonary interstitial edema.    TTE with preserved estimated EF of 50-55%  Monitor for signs concerning for ACS     Dysphagia/Odynophagia: Ongoing for several months  SLP eval and treat ordered, patient passed swallow eval  Recommend outpatient ENT referral, this was placed for discharge     Type 1 Diabetes Mellitus: Controlled. Last A1c 6.2 (7/9/25).   Continue 
    Hospitalist Progress Note    Patient:  Pedro Sotelo      Unit/Bed:4B-07/007-A    YOB: 1985    MRN: [id number]       Acct: [id number]     PCP: Nga Martines DO    Date of Admission: 7/15/2025    Assessment/Plan:    Hypertensive emergency: Resolved. Associated severe headache. Renal Insufficiency as below. CT head (-). BNP 01882 and troponin 145 with flat trend.  Started on Cardene gtt, weaned off 7/17  Nephrology consulted  Patient started on Coreg, dose increased 7/16/25  Oral hydralazine started by Neprhology, increased 7/17/25  IV Hydralazine PRN ordered  TTE with preserved EF  Renal US with elevated resistive index on the left side     Acute kidney injury: Improved (4.8 today)  Nephrology consulted and following  Renal ultrasound with elevated resistive index on the left side  Discontinue Lisinopril, Coreg and Hydralazine added for BP as above  BMP daily  Renally dose medication  Avoid nephrotoxic agents  Plan for renal biopsy Monday  Follow immunofixation/SPEP/ANCA, GBM, ANNELISE  Hep B surface antigen/hep C negative   7/18/25: Patient to get temporary dialysis catheter placed in IR today, to then go for dialysis thereafter.  7/19/25: Creatinine improved after dialysis yesterday today  7/20/25: Lovenox converted to Heparin. Creatinine decreased to 4.8 today.     Acute Headache:  Compazine, Tylenol and Bendaryl ordered  Avoid NSAIDs with renal insufficiency  Consider Droperidol  Hopefully will improve with better, consistent BP control as well    Elevated BNP/Troponin: Likely secondary to hypertensive emergency  BNP 87570 and troponin flat at 145->145.   CT chest shows very small left pleural effusion and pulmonary interstitial edema.    TTE with preserved estimated EF of 50-55%  Monitor for signs concerning for ACS     Dysphagia/Odynophagia: Ongoing for several months  SLP eval and treat ordered, patient passed swallow eval  Recommend outpatient ENT referral, this was placed for discharge   
    Hospitalist Progress Note    Patient:  Pedro Sotelo      Unit/Bed:4B-07/007-A    YOB: 1985    MRN: [id number]       Acct: [id number]     PCP: Nga Martines DO    Date of Admission: 7/15/2025    Assessment/Plan:    Hypertensive emergency, resolved: Associated severe headaches, renal insufficiency-see below.  CT head negative.  BNP 65618,  with flat trend.  Echo preserved.  Renal US with elevated resisted index on the right. S/p nicardipine gtt.  Nephrology consulted, started Coreg and oral hydralazine.  IV hydralazine as needed.  Monitor BP. Escalate antihypertensives as appropriate.   WARNER on underlying CKDIII, improved: Unclear etiology.  In setting of hypertensive emergency-above renal US elevated resistance index on the left side.  Held lisinopril, started Coreg and hydralazine.  Limit nephrotoxic agents.  Monitor renal function with daily BMP.  Negative GBM, ANNELISE, Immunofixation/SPEP/ANCA, GBM, ANNELISE, Hepatitis B and C.  7/18 temporary dialysis catheter, 7/21 removed and tunneled dialysis catheter placed.  Nephrology following, plan for CT guided renal biopsy 7/22.  Acute headache: Secondary to hypertensive emergency-see above.  Continue symptomatic management.  On Compazine, Tylenol and Benadryl.  Avoid NSAIDs.  Consider droperidol if refractory.  NSTEMI: likely Type II in setting of demand secondary to hypertensive emergency and with concomitant renal insufficiency.  , x 2.  proBNP 12 867.  CT with some volume overload.  Significant renal insufficiency, HD catheter placed.  Echo EF 50 to 55%.  No signs or concerns of ACS.  Repeat HST and check EKG if CP or SOB.  Dysphagia/odynophagia: Ongoing for several months.  SLP evaluated, patient passed bedside swallow.  Plan for outpatient EGD.  Further advised outpatient ENT follow-up pending referral.  IDDMI: Left HbA1c 6.2 7/2025.  Home antibiotic regimen include insulin pump, but does not have it.  Started on medium dose SSI 
    Pharmacist Review and Automatic Dose Adjustment of Prophylactic Enoxaparin      The reviewing pharmacist has made an adjustment to the ordered enoxaparin dose or converted to UFH per the approved [facility] protocol and table as identified below.        Pedro Sotelo is a 40 y.o. male.     Recent Labs     07/18/25  0415 07/19/25  0340 07/20/25  0433   CREATININE 6.6* 5.5* 4.8*       Estimated Creatinine Clearance: 24 mL/min (A) (based on SCr of 4.8 mg/dL (HH)).    Height:   Ht Readings from Last 1 Encounters:   07/17/25 1.753 m (5' 9\")     Weight:  Wt Readings from Last 1 Encounters:   07/19/25 102.1 kg (225 lb 1.4 oz)               Plan: Based upon the patient's weight and renal function, the enoxaparin dose has been changed/converted to heparin 5000 units subq TID      Thank you,  Whitney Mcgraw, [facility]  7/20/2025, 6:19 AM    
   07/23/25 1603   Encounter Summary   Encounter Overview/Reason Attempted Encounter   Service Provided For Patient not available   Referral/Consult From Rounding   Last Encounter  07/23/25   Complexity of Encounter Low   Begin Time 1601   End Time  1603   Total Time Calculated 2 min      attempted visit with patient who was not in his room. Spiritual health remains available.  
  Type and Reason for Visit:    Initial, Patient education, LOS (renal diet)     Nutrition Recommendations/Plan:   Renal diet education verbally provided to patient and his significant other.  Handout given as well.    Continue current diet.    Patient will be followed by outpatient dialysis dietitian.      Nutrition Assessment:      Pt. at low nutrition risk per RD evaluation.  Patient seen in dialysis earlier this morning.  Admitted with WARNER on CKD, hemodialysis started 7/18/25, planning to continue outpatient. S/p kidney biopsy 7/22/25.  Hx: HLD, HTN, DM, head injury, asthma, alcohol use.  Patient reports good appetite/intake PTA and now. States he wasn't following any diet restrictions prior to admit.  Renal diet reviewed with him. Significant other was seen in patients room and renal diet education provided to her as well.  7/9/25: HgbA1c 6.2%.  7/23/25: Sodium 138, Potassium 3.9, BUN 30, Creatinine 6, Glucose 180, chemstick 209. 7/18/25: Phosphorus 5.9.  225 ml urine output in the last 24 hours.  Usual weight 210-220#.        Malnutrition Assessment:  No Malnutrition      Wounds:    None     Current Nutrition Therapies:    ADULT DIET; Regular; 5 carb choices (75 gm/meal); No Added Salt (3-4 gm), % of most meals.      Anthropometric Measures:  Height:    175.3 cm (5' 9\")  Current Body Weight:   99.9 kg (220 lb 3.8 oz) (7/23/25 bedscale, +1 LE edema, dialysis)  Admission Body Weight:    100.6 kg (221 lb 12.5 oz) (7/15/25 +1 LE edema)  Usual Body Weight:      (~ 210-220# per patient. Per EMR: 2/12/24: 215#13oz, 2/5/25: 212#13oz, 4/8/25: 217#11oz)  Ideal Body Weight:     160 lbs   BMI:   32.5  BMI Categories:    Obese Class 1 (BMI 30.0-34.9)     Nutrition Diagnosis:   Food & nutrition-related knowledge deficit  related to  renal dysfunction  as evidenced by    (patient interview.)      Nutrition Interventions:   Food and/or Nutrient Delivery:    Continue Current Diet  Nutrition Education/Counseling:    
1020 Pt in CT imaging for CT guided random renal biopsy. Discussed procedure with pt and pt verbalizes understanding.  1031 Connected to monitor.  1038 Dr Sharpe to speak to pt.  1040 Pt positioned prone on table and pre scans taken.  1048 Procedure started.  1054 6 core biopsy samples obtained and set to lab.  1057 Surgiform injected as needle pulled back. Pt tolerating procedure well. Waiting on call from lab.  1119 Call received from lab. Specimen inadequate. Dr Sharpe informed.   1122 Dr Sharpe to re-start procedure.  1124 Additional samples obtained and sent to lab.  1125 Surgifoam injected as needle pulled back. Pt c/o slight discomfort-7 on scale of 1-10 on right flank area.  1134 Pt states pain is better.  1135 Call received from lab. Specimen adequate. Dr Sharpe informed.  1137 Needle removed and post images taken.  1139 Triple antibiotic ointment and band-aid applied to site on right lower back. Site without redness, swelling or hematoma.  1141 Pt positioned on bed for comfort. Denies any pain in right lower back.  1145 Report called to Carolina RODRIGUEZ.  1150 Pt transferred to  per bed.  
1405 Patient received in IR for temporary dialysis catheter insertion.   1410 This procedure has been fully reviewed with the patient and written informed consent has been obtained.  1417 Procedure started with   1418 Access to right jugular vein with use of sonosite.  1422 Line sutured in place.  1424 Procedure completed; patient tolerated well. Dressing to right neck, chg; no bleeding noted.  1428 Patient on bed; comfort ensured.  1430 Patient taken to inpatient dialysis via bed/transport. Pt alert and oriented x3; follows commands. Skin pink, warm, and dry. Respirations easy, regular, and nonlabored.     
1417 Pt in specials radiology for temp to tunneled dialysis catheter insertion. Discussed procedure with pt and pt verbalizes understanding.  1425 Moved to table and attached to monitor.  1429 Dr Winters to speak to pt.  1431 RIght neck/chest and catheter site prepped and draped.  1441 Procedure started.  1445 Temp catheter exchanged for a 14.5 fr x 28 cm Titan dialysis catheter.  1446 Catheter sutured to right neck/chest.  1450 Tegaderm with CHG applied to site on right chest 2 x 2 with op-site applied to neck site. Sites without redness, swelling or hematoma.  1456 Pt positioned on bed for comfort. Offers no complaints of pain.  1459 Pt transferred to  per bed. Report called to RN.    
Discharge teaching and instructions for diagnosis/procedure of hypertensive emergency completed with patient using teachback method. AVS reviewed. Printed prescriptions given to patient. Patient voiced understanding regarding prescriptions, follow up appointments, and care of self at home. Discharged in a wheelchair to  skilled nursing per family   
[facility]  SPEECH THERAPY  STRZ CVICU 4B  Clinical Swallow Evaluation    Discharge Recommendations: Continue to assess pending progress  DIET ORDER RECOMMENDATIONS AFTER EVALUATION: Regular, thin liquids  Strategies:  Strategies pending MBS completion     SLP Individual Minutes  Time In: 1231  Time Out: 1240  Minutes: 9  Timed Code Treatment Minutes: 0 Minutes       Date: 7/15/2025  Patient Name: Pedro Sotelo      CSN: [id number]   : 1985  (40 y.o.)  Gender: male   Referring Physician:   Dank Llamas PA-C  Diagnosis: Hypertensive emergency    History of Present Illness/Injury: Patient admitted to [facility] with above med dx; please refer to physician H&P for full details. Per chart review, \"40 y.o. male with PMHx of HTN, DM1, GERD who presented to [facility] with initial chief complaint of difficulties swallowing. The patient has had these symptoms for several months. He reports the sensation is like something is caught in his throat. He has had some nausea and vomiting some of which is blood tinged.  He has been following up with his PCP and had an EGD scheduled for  but felt that he could not wait.  He also reports a persistent headache that began a couple of days ago.  He has had migraines in the past, but reports this headache was more painful than usual.  He went to the emergency department this evening for further evaluation.  Incidentally, he was found to be significantly hypertensive with a systolic blood pressure of 244.  He reports that his blood pressure was \"normal\" around 120/80 during his visit with his PCP on 25.  He reports compliance with his Lisinopril-though in reviewing chart, has had some compliance issues in the recent past documented by his PCP. He was started on a Nicardipine infusion. He underwent CT head for his headache which was found to be negative.  He was also found to have an acute kidney injury with creatinine of 5.9 as well as elevated BNP 
[facility]   Nephrology progress note  7/16/2025, 10:52 AM      Pt Name:    Pedro Sotelo  MRN:     [id number]     YOB: 1985  Admit Date:    7/15/2025 12:27 AM    Chief Complaint: Nephrology following for WARNER/CKD     Subjective:  Patient was seen and examined this morning  No chest pain or shortness of breath  Feels okay  Patient put out 1500 mL of urine  No Cardene drip  Blood pressure slowly improving  On room air    Objective:  24HR INTAKE/OUTPUT:    Intake/Output Summary (Last 24 hours) at 7/16/2025 1052  Last data filed at 7/16/2025 1042  Gross per 24 hour   Intake 3099.45 ml   Output 1551 ml   Net 1548.45 ml         I/O last 3 completed shifts:  In: 3627.8 [P.O.:1560; I.V.:2067.8]  Out: 1851 [Urine:1850; Stool:1]  I/O this shift:  In: 250 [P.O.:250]  Out: -    Admission weight: 100.6 kg (221 lb 12.5 oz)  Wt Readings from Last 3 Encounters:   07/15/25 100.6 kg (221 lb 12.5 oz)   07/09/25 98.6 kg (217 lb 4.8 oz)   04/08/25 98.7 kg (217 lb 11.2 oz)        Vitals :   Vitals:    07/16/25 0600 07/16/25 0700 07/16/25 0833 07/16/25 0900   BP: (!) 144/96 136/79 (!) 179/123 (!) 172/113   Pulse: 85 84 87 87   Resp: 14 12 16 20   Temp:   98.5 °F (36.9 °C)    TempSrc:   Oral    SpO2:   94%    Weight:       Height:           Physical examination  General Appearance: alert and cooperative with exam, appears comfortable, no distress  Lungs: No labored breathing noted  GI: soft, non-tender, no guarding  Extremities: No significant LE edema    Medications:  Infusion:    sodium chloride      niCARdipine 5 mg/hr (07/16/25 0602)    dextrose      sodium bicarbonate 75 mEq in sodium chloride 0.45 % 1,000 mL infusion 75 mL/hr at 07/16/25 0602     Meds:    sodium chloride flush  5-40 mL IntraVENous 2 times per day    enoxaparin  30 mg SubCUTAneous Daily    insulin lispro  0-8 Units SubCUTAneous 4x Daily AC & HS    carvedilol  6.25 mg Oral BID      Meds prn: sodium chloride flush, sodium 
[facility]   Nephrology progress note  7/17/2025      Pt Name:    Pedro Sotelo  MRN:     [id number]     YOB: 1985  Admit Date:    7/15/2025 12:27 AM    Chief Complaint: Nephrology following for WARNER/CKD     Subjective:  Patient was seen and examined this morning  No chest pain or shortness of breath  Feels okay  Patient put out 3200 mL of urine  On low dose Cardene drip      Objective:  24HR INTAKE/OUTPUT:    Intake/Output Summary (Last 24 hours) at 7/17/2025 1126  Last data filed at 7/17/2025 0819  Gross per 24 hour   Intake 2011.41 ml   Output 3200 ml   Net -1188.59 ml         I/O last 3 completed shifts:  In: 2947.6 [P.O.:930; I.V.:2017.6]  Out: 4750 [Urine:4750]  I/O this shift:  In: 519.1 [I.V.:519.1]  Out: -    Admission weight: 100.6 kg (221 lb 12.5 oz)  Wt Readings from Last 3 Encounters:   07/17/25 106.5 kg (234 lb 12.6 oz)   07/09/25 98.6 kg (217 lb 4.8 oz)   04/08/25 98.7 kg (217 lb 11.2 oz)        Vitals :   Vitals:    07/17/25 0700 07/17/25 0715 07/17/25 0726 07/17/25 0745   BP: (!) 160/95 (!) 157/96 (!) 151/101 (!) 148/81   Pulse: 97 99 99 96   Resp: 12 15 (!) 0 15   Temp:    98.4 °F (36.9 °C)   TempSrc:    Oral   SpO2:    95%   Weight:       Height:           Physical examination  General Appearance: alert and cooperative with exam, appears comfortable, no distress  Lungs: No labored breathing noted  GI: soft, non-tender, no guarding  Extremities: No significant LE edema    Medications:  Infusion:    sodium chloride      niCARdipine Stopped (07/17/25 1058)    dextrose      sodium bicarbonate 75 mEq in sodium chloride 0.45 % 1,000 mL infusion 40 mL/hr at 07/17/25 0819     Meds:    hydrALAZINE  25 mg Oral 3 times per day    carvedilol  12.5 mg Oral BID WC    hydrocortisone   Topical BID    sodium chloride flush  5-40 mL IntraVENous 2 times per day    enoxaparin  30 mg SubCUTAneous Daily    insulin lispro  0-8 Units SubCUTAneous 4x Daily AC & HS     Meds prn: 
[facility]   Nephrology progress note  7/19/2025      Pt Name:    Pedro Sotelo  MRN:     [id number]     YOB: 1985  Admit Date:    7/15/2025 12:27 AM    Chief Complaint: Nephrology following for WARNER/CKD     Subjective:  Patient was seen and examined this morning  No chest pain or shortness of breath  Feels okay  Seen on dialysis      Objective:  24HR INTAKE/OUTPUT:    Intake/Output Summary (Last 24 hours) at 7/19/2025 1109  Last data filed at 7/19/2025 1044  Gross per 24 hour   Intake 1920 ml   Output 3200 ml   Net -1280 ml         I/O last 3 completed shifts:  In: 1480 [P.O.:1080]  Out: 3200 [Urine:1800]  I/O this shift:  In: 800   Out: 1800    Admission weight: 100.6 kg (221 lb 12.5 oz)  Wt Readings from Last 3 Encounters:   07/19/25 102.1 kg (225 lb 1.4 oz)   07/09/25 98.6 kg (217 lb 4.8 oz)   04/08/25 98.7 kg (217 lb 11.2 oz)        Vitals :   Vitals:    07/19/25 0054 07/19/25 0347 07/19/25 0600 07/19/25 1044   BP: (!) 111/56 135/77 (!) 152/81 (!) 154/80   Pulse: 83 80  80   Resp:    14   Temp: 98.3 °F (36.8 °C) 97.9 °F (36.6 °C)  98.2 °F (36.8 °C)   TempSrc: Oral Oral     SpO2: 95% 95%  97%   Weight:    102.1 kg (225 lb 1.4 oz)   Height:           Physical examination  General Appearance: alert and cooperative with exam, appears comfortable, no distress  Mild periorbital edema noted but improved  Lungs: No labored breathing noted  GI: soft, non-tender, no guarding  Extremities: Trace lower extremity edema noted    Medications:  Infusion:    sodium chloride 20 mL/hr at 07/17/25 1143    sodium chloride      niCARdipine Stopped (07/17/25 1058)    dextrose       Meds:    carvedilol  25 mg Oral BID WC    hydrALAZINE  100 mg Oral 3 times per day    doxazosin  2 mg Oral QHS    hydrocortisone   Topical BID    sodium chloride flush  5-40 mL IntraVENous 2 times per day    enoxaparin  30 mg SubCUTAneous Daily    insulin lispro  0-8 Units SubCUTAneous 4x Daily AC & HS     Meds prn: 
[facility]   Nephrology progress note  7/20/2025      Pt Name:    Pedro Sotelo  MRN:     [id number]     YOB: 1985  Admit Date:    7/15/2025 12:27 AM    Chief Complaint: Nephrology following for WARNER/CKD     Subjective:  Patient was seen and examined this morning  No chest pain or shortness of breath  Needs kidney biopsy      Objective:  24HR INTAKE/OUTPUT:  No intake or output data in the 24 hours ending 07/20/25 1154        I/O last 3 completed shifts:  In: 950 [P.O.:150]  Out: 1800   No intake/output data recorded.   Admission weight: 100.6 kg (221 lb 12.5 oz)  Wt Readings from Last 3 Encounters:   07/19/25 102.1 kg (225 lb 1.4 oz)   07/09/25 98.6 kg (217 lb 4.8 oz)   04/08/25 98.7 kg (217 lb 11.2 oz)        Vitals :   Vitals:    07/19/25 2006 07/20/25 0019 07/20/25 0341 07/20/25 0800   BP: 133/86 (!) 107/56 124/61 (!) 141/90   Pulse: 87 85 84 85   Resp: 18   16   Temp: 98 °F (36.7 °C) 98.5 °F (36.9 °C) 98.3 °F (36.8 °C) 98.4 °F (36.9 °C)   TempSrc: Oral Oral Oral Oral   SpO2: 94% 95% 95% 98%   Weight:       Height:           Physical examination  General Appearance: alert and cooperative with exam, appears comfortable, no distress  Mild periorbital edema noted but improved  Lungs: No labored breathing noted  GI: soft, non-tender, no guarding  Extremities: Trace lower extremity edema noted    Medications:  Infusion:    sodium chloride      niCARdipine Stopped (07/17/25 1058)    dextrose       Meds:    heparin (porcine)  5,000 Units SubCUTAneous 3 times per day    doxazosin  4 mg Oral QHS    [START ON 7/21/2025] epoetin xuan-epbx  6,000 Units SubCUTAneous Once per day on Monday Wednesday Friday    carvedilol  25 mg Oral BID WC    hydrALAZINE  100 mg Oral 3 times per day    hydrocortisone   Topical BID    sodium chloride flush  5-40 mL IntraVENous 2 times per day    insulin lispro  0-8 Units SubCUTAneous 4x Daily AC & HS     Meds prn: prochlorperazine, diphenhydrAMINE, 
[facility]   Nephrology progress note  7/21/2025      Pt Name:    Pedro Sotelo  MRN:     [id number]     YOB: 1985  Admit Date:    7/15/2025 12:27 AM    Chief Complaint: Nephrology following for WARNER/CKD     Subjective:  Patient was seen and examined this morning  No chest pain or shortness of breath  BP noted, slowly improving but still intermittently high  Cardene drip has been off for last several days        Objective:  24HR INTAKE/OUTPUT:    Intake/Output Summary (Last 24 hours) at 7/21/2025 1039  Last data filed at 7/20/2025 1300  Gross per 24 hour   Intake 400 ml   Output 300 ml   Net 100 ml           I/O last 3 completed shifts:  In: 750 [P.O.:750]  Out: 300 [Urine:300]  No intake/output data recorded.   Admission weight: 100.6 kg (221 lb 12.5 oz)  Wt Readings from Last 3 Encounters:   07/21/25 100.9 kg (222 lb 7.1 oz)   07/09/25 98.6 kg (217 lb 4.8 oz)   04/08/25 98.7 kg (217 lb 11.2 oz)        Vitals :   Vitals:    07/21/25 0353 07/21/25 0700 07/21/25 0715 07/21/25 0800   BP: 136/79 (!) 155/82  (!) 170/89   Pulse:  79  82   Resp:    14   Temp: 97.8 °F (36.6 °C)   98 °F (36.7 °C)   TempSrc: Oral      SpO2: 97%      Weight:   100.9 kg (222 lb 8 oz) 100.9 kg (222 lb 7.1 oz)   Height:           Physical examination  General Appearance: alert and cooperative with exam, appears comfortable, no distress  Lungs: No labored breathing noted  GI: soft, non-tender, no guarding  Extremities: Trace lower extremity edema noted but overall improved    Medications:  Infusion:    sodium chloride      niCARdipine Stopped (07/17/25 1058)    dextrose       Meds:    [Held by provider] heparin (porcine)  5,000 Units SubCUTAneous 3 times per day    doxazosin  4 mg Oral QHS    epoetin xuan-epbx  6,000 Units SubCUTAneous Once per day on Monday Wednesday Friday    carvedilol  25 mg Oral BID WC    hydrALAZINE  100 mg Oral 3 times per day    hydrocortisone   Topical BID    sodium chloride flush  
[facility]   Nephrology progress note  7/22/2025      Pt Name:    Pedro Sotelo  MRN:     [id number]     YOB: 1985  Admit Date:    7/15/2025 12:27 AM    Chief Complaint: Nephrology following for WARNER/CKD     Subjective:  Patient was seen and examined this morning  No chest pain or shortness of breath  Awaiting kidney biopsy        Objective:  24HR INTAKE/OUTPUT:    Intake/Output Summary (Last 24 hours) at 7/22/2025 1105  Last data filed at 7/22/2025 0806  Gross per 24 hour   Intake 415 ml   Output 2000 ml   Net -1585 ml           I/O last 3 completed shifts:  In: 405 [I.V.:5]  Out: 3300 [Urine:1900]  I/O this shift:  In: 10 [I.V.:10]  Out: -    Admission weight: 100.6 kg (221 lb 12.5 oz)  Wt Readings from Last 3 Encounters:   07/22/25 101 kg (222 lb 10.6 oz)   07/09/25 98.6 kg (217 lb 4.8 oz)   04/08/25 98.7 kg (217 lb 11.2 oz)        Vitals :   Vitals:    07/22/25 1045 07/22/25 1050 07/22/25 1055 07/22/25 1100   BP: 127/71 124/75 123/71 127/75   Pulse: 71 79 78 78   Resp: 12 14 12 13   Temp:       TempSrc:       SpO2: 94% 96% 98% 97%   Weight:       Height:           Physical examination  General Appearance: alert and cooperative with exam, appears comfortable, no distress  Lungs: No labored breathing noted  GI: soft, non-tender, no guarding  Extremities: Trace lower extremity edema noted but overall improved    Medications:  Infusion:    sodium chloride      dextrose       Meds:    doxazosin  4 mg Oral 2 times per day    amLODIPine  5 mg Oral Daily    [Held by provider] heparin (porcine)  5,000 Units SubCUTAneous 3 times per day    epoetin xuan-epbx  6,000 Units SubCUTAneous Once per day on Monday Wednesday Friday    carvedilol  25 mg Oral BID WC    hydrALAZINE  100 mg Oral 3 times per day    hydrocortisone   Topical BID    sodium chloride flush  5-40 mL IntraVENous 2 times per day    insulin lispro  0-8 Units SubCUTAneous 4x Daily AC & HS     Meds prn: midazolam, 
[facility]   Nephrology progress note  7/23/2025      Pt Name:    Pedro Sotelo  MRN:     [id number]     YOB: 1985  Admit Date:    7/15/2025 12:27 AM    Chief Complaint: Nephrology following for WARNER/CKD     Subjective:  Patient was seen and examined this morning  No chest pain or shortness of breath  Reportedly had 3.3-second pause, was symptomatic yesterday  Seen on dialysis and tolerating HD well      Objective:  24HR INTAKE/OUTPUT:    Intake/Output Summary (Last 24 hours) at 7/23/2025 1043  Last data filed at 7/23/2025 0740  Gross per 24 hour   Intake 591 ml   Output 550 ml   Net 41 ml           I/O last 3 completed shifts:  In: 601 [P.O.:591; I.V.:10]  Out: 225 [Urine:225]  I/O this shift:  In: -   Out: 325 [Urine:325]   Admission weight: 100.6 kg (221 lb 12.5 oz)  Wt Readings from Last 3 Encounters:   07/23/25 99.9 kg (220 lb 3.8 oz)   07/09/25 98.6 kg (217 lb 4.8 oz)   04/08/25 98.7 kg (217 lb 11.2 oz)        Vitals :   Vitals:    07/23/25 0412 07/23/25 0654 07/23/25 0725 07/23/25 0750   BP: 131/71 124/76 136/82 (!) 148/86   Pulse: 86  85 83   Resp: 18  16 14   Temp: 99.2 °F (37.3 °C)  98.4 °F (36.9 °C) 98.6 °F (37 °C)   TempSrc: Oral  Oral    SpO2: 96%  97% 98%   Weight: 101.8 kg (224 lb 6.9 oz)   99.9 kg (220 lb 3.8 oz)   Height:           Physical examination  General Appearance: alert and cooperative with exam, appears comfortable, no distress  Lungs: No labored breathing noted  GI: soft, non-tender, no guarding  Extremities: Trace lower extremity edema noted but overall improved    Medications:  Infusion:    sodium chloride      dextrose       Meds:    doxazosin  4 mg Oral 2 times per day    amLODIPine  5 mg Oral Daily    [Held by provider] heparin (porcine)  5,000 Units SubCUTAneous 3 times per day    epoetin xuan-epbx  6,000 Units SubCUTAneous Once per day on Monday Wednesday Friday    [Held by provider] carvedilol  25 mg Oral BID WC    hydrALAZINE  100 mg Oral 3 
[facility]   Nephrology progress note  7/24/2025      Pt Name:    Pedro Sotelo  MRN:     [id number]     YOB: 1985  Admit Date:    7/15/2025 12:27 AM    Chief Complaint: Nephrology following for WARNER/CKD     Subjective:  Patient was seen and examined this morning  No chest pain or shortness of breath      Objective:  24HR INTAKE/OUTPUT:    Intake/Output Summary (Last 24 hours) at 7/24/2025 1053  Last data filed at 7/23/2025 1122  Gross per 24 hour   Intake 400 ml   Output 2400 ml   Net -2000 ml           I/O last 3 completed shifts:  In: 400   Out: 2950 [Urine:550]  No intake/output data recorded.   Admission weight: 100.6 kg (221 lb 12.5 oz)  Wt Readings from Last 3 Encounters:   07/24/25 98.4 kg (217 lb)   07/09/25 98.6 kg (217 lb 4.8 oz)   04/08/25 98.7 kg (217 lb 11.2 oz)        Vitals :   Vitals:    07/23/25 2000 07/24/25 0008 07/24/25 0730 07/24/25 1002   BP: (!) 186/110 (!) 140/81 (!) 152/85    Pulse: 88 85 91    Resp: 16 16 16    Temp: 99 °F (37.2 °C) 98.4 °F (36.9 °C) 98.3 °F (36.8 °C)    TempSrc: Oral Oral Oral    SpO2: 95% 95% 95%    Weight:    98.4 kg (217 lb)   Height:           Physical examination  General Appearance: alert and cooperative with exam, appears comfortable, no distress  Lungs: No labored breathing noted  GI: soft, non-tender, no guarding  Extremities: Trace lower extremity edema noted but overall improved    Medications:  Infusion:    sodium chloride      dextrose       Meds:    doxazosin  4 mg Oral 2 times per day    amLODIPine  5 mg Oral Daily    [Held by provider] heparin (porcine)  5,000 Units SubCUTAneous 3 times per day    epoetin xuan-epbx  6,000 Units SubCUTAneous Once per day on Monday Wednesday Friday    [Held by provider] carvedilol  25 mg Oral BID WC    hydrALAZINE  100 mg Oral 3 times per day    hydrocortisone   Topical BID    sodium chloride flush  5-40 mL IntraVENous 2 times per day    insulin lispro  0-8 Units SubCUTAneous 4x Daily AC 
[facility]  SPEECH THERAPY  [facility] CVICU 4B  Modified Barium Swallow    Discharge Recommendations: No additional ST services recommended  DIET ORDER RECOMMENDATIONS AFTER EVALUATION: Regular, thin liquids  Strategies:  Full Upright Position, Small Bite/Sip, and Alternate Solids and Liquids     SLP Individual Minutes  Time In: 0757  Time Out: 0808  Minutes: 11  Timed Code Treatment Minutes: 0 Minutes       Date: 2025  Patient Name: Pedro Sotelo      CSN: [id number]   : 1985  (40 y.o.)  Gender: male   Referring Physician: Dank Llamas PA-C  Diagnosis: Hypertensive crisis [I16.9]  Precautions: Fall risk  History of Present Illness/Injury: Patient admitted to [facility] with above med dx; please refer to physician H&P for full details. Per chart review, \"40 y.o. male with PMHx of HTN, DM1, GERD who presented to [facility] with initial chief complaint of difficulties swallowing. The patient has had these symptoms for several months. He reports the sensation is like something is caught in his throat. He has had some nausea and vomiting some of which is blood tinged.  He has been following up with his PCP and had an EGD scheduled for  but felt that he could not wait.  He also reports a persistent headache that began a couple of days ago.  He has had migraines in the past, but reports this headache was more painful than usual.  He went to the emergency department this evening for further evaluation.  Incidentally, he was found to be significantly hypertensive with a systolic blood pressure of 244.  He reports that his blood pressure was \"normal\" around 120/80 during his visit with his PCP on 25.  He reports compliance with his Lisinopril-though in reviewing chart, has had some compliance issues in the recent past documented by his PCP. He was started on a Nicardipine infusion. He underwent CT head for his headache which was found to be negative.  He was also found to have an acute 
Order received for random renal biopsy. Images, medications, labs reviewed. Pt will be scheduled for random renal biopsy on 7/22/25 at 1100. Pt is to be NPO 4 hours prior to procedure. Please hold blood thinners according to radiology protocol. Consent for procedure will be obtained by the IR staff upon pt's arrival to the department. If the pt is unable to sign consent, please obtain consent ahead of time or have family present day of procedure to give consent. These and any additional orders will be placed as SIGNED and HELD ORDERS by the radiologist the day/evening prior to procedure. They will need to be released and initiated by the NIGHT SHIFT staff after midnight. Pt's nurse Carolina was notified of the above and verbalized understanding. The ordering provider Dr Camacho was notified as well.                 
Patient moved from room 5 to room 7 due to need for ICU bed.   
Spiritual Health History and Assessment/Progress Note  [facility]    (P) Initial Encounter,  , (P) Adjustment to illness,      Name: Pedro Sotelo MRN: [id number]    Age: 40 y.o.     Sex: male   Language: English   Rastafari: None   Hypertensive emergency     Date: 7/17/2025                           Spiritual Assessment began in [email or web address] CVICU 4B        Referral/Consult From: (P) Rounding   Encounter Overview/Reason: (P) Initial Encounter  Service Provided For: (P) Patient, Significant other, Family    Sabrina, Belief, Meaning:   Patient is connected with a sabrina tradition or spiritual practice  Family/Friends identify as spiritual and are connected with a sabrina tradition or spiritual practice      Importance and Influence:  Patient has spiritual/personal beliefs that influence decisions regarding their health  Family/Friends have spiritual/personal beliefs that influence decisions regarding the patient's health    Community:  Patient is connected with a spiritual community and feels well-supported. Support system includes: Spouse/Partner, Children, and Sabrina Community  Family/Friends are connected with a spiritual community: and feel well-supported. Support system includes: Spouse/Partner, Children, and Sabrina Community    Assessment and Plan of Care:     Patient Interventions include: Facilitated expression of thoughts and feelings  Family/Friends Interventions include: Facilitated expression of thoughts and feelings    Patient Plan of Care: Spiritual Care available upon further referral  Family/Friends Plan of Care: Spiritual Care available upon further referral   made an initial visit to assess spiritual needs. Patient was sitting up on the side of bed eating lunch, with his fiance Jie and their adopted daughter present. They are members of [employer] in [place]. They were engaged one year ago and are to be  on Saturday. Staff shared the Advance Directives 
Summonsed urgently by wife who states he was talking to her and guests and then not. Upon arrival to this RN finds pt. In chair with head turned left and toward shoulder - not responsive to voice at first, but becomes responsive to voice within a few seconds - follows all commands, answers all questions and shows no neuro changes. BP - 147/101, HR 70, O2 sats - 98%; Glucose - 156. Pt. Is assisted back to bed and requested him not to eat/drink at this time. Reviewing monitor and alarms - pt. Has had a 3.32 second pause at this time. Primary RN, Carolina is updated - she is updating provider.  
TRANSFER - OUT REPORT:    Verbal report given to Erika (RAIMUNDO)(name) on Pedro Sotelo being transferred to inpatient dialysis(unit) for routine progression of patient care       Report consisted of patient's Situation, Background, Assessment and   Recommendations(SBAR).     Information from the following report(s) Nurse Handoff Report, Surgery Report, and MAR was reviewed with the receiving nurse.    Opportunity for questions and clarification was provided.      Patient transported with:   Monitor    
t request from [facility]. Pt into their ED with A BP of 244/144. Pt started on cardene gtt currently running at 7.5 mg/hr. Most recent /94 All other vitals are stable. Pt also has WARNER, creatinine is 5.6. Electrolytes all WNL, Pt still making urine at this time. Troponin 145 BNP 37094.   
ondansetron **OR** ondansetron, polyethylene glycol, acetaminophen **OR** acetaminophen, glucose, dextrose bolus **OR** dextrose bolus, glucagon (rDNA), dextrose     Lab Data :  CBC:   Recent Labs     07/16/25  0406 07/17/25  0506 07/18/25  0415   WBC 7.4 7.5 7.3   HGB 10.3* 10.2* 9.3*   HCT 29.6* 30.0* 27.6*    319 297     CMP:  Recent Labs     07/16/25  0406 07/17/25  0506 07/18/25  0414 07/18/25 0415    139  --  139   K 4.9 4.8  --  5.5*    108  --  109   CO2 20* 18*  --  16*   BUN 47* 48*  --  52*   CREATININE 6.2* 6.3*  --  6.6*   GLUCOSE 112* 129*  --  159*   CALCIUM 7.9* 8.2*  --  7.9*   MG 2.0 2.2 2.1  --    PHOS 5.6* 5.4* 5.9*  --      Hepatic:   No results for input(s): \"LABALBU\", \"AST\", \"ALT\", \"BILITOT\", \"ALKPHOS\" in the last 72 hours.    Invalid input(s): \"ALB\"      Diagnostics:  Ultrasound no hydronephrosis     Old tests reviewed.   UPC 4.4 g     Impression and Plan:  Severe WARNER on underlying CKD 3  Baseline serum creatinine 1.5 January 2025  Has underlying nephrotic proteinuria likely secondary to type I diabetic nephropathy with underlying hypertensive nephrosclerosis.  Does not follow with a nephrologist.  Serum creatinine is rising.  Serologies pending  Although likely has underlying diabetic nephropathy and hypertensive nephrosclerosis his creatinine was only 1.5 about 6 months ago.  Now staying over 6.   Discussed indications of kidney biopsy in detail.  Indications/R/B/A were all discussed.  Will schedule kidney biopsy early next week.  Orders placed in the chart  Serum creatinine up to 6.6.  Potassium is rising.  Has some mild fluid overload.  Discussed initiation of renal replacement therapy.  Indications, R/B/A were all discussed.  Patient is in agreement.  Will request IR to place temporary dialysis catheter.  Hemodialysis orders placed for today and tomorrow  Hypertension.  Increase hydralazine.  Increase Coreg.  Add Cardura  Nephrotic range proteinuria in setting of 
technology.**            Electronically signed by Dr Nixon Winters      US RENAL COMPLETE   Final Result   1. Elevated resistive index left side.   2. Study otherwise unremarkable.            **This report has been created using voice recognition software.  It may contain   minor errors which are inherent in voice recognition technology.**      Electronically signed by Dr. Bear Sharpe      IR FLUORO GUIDED CVA DEVICE PLMT/REPLACE/REMOVAL    (Results Pending)       Diet: ADULT DIET; Regular; 5 carb choices (75 gm/meal); No Added Salt (3-4 gm)      Code Status: Full Code      Electronically signed by Dank Llamas PA-C on 7/18/2025 at 8:52 AM

## 2025-07-24 NOTE — DISCHARGE SUMMARY
escalated with Cardene gtt. eventually moved off.  HD catheter was placed 7/18 and patient underwent temporary dialysis gradual improvement in renal function.  Renal US showed elevated resistive index on the left side.  7/21 tunneled dialysis catheter was placed. Underwent CT-guided renal biopsy 7/22. After the procedure the patient did have an episode of passing out for a few seconds, with telemetry showing a 3.32-second pause. Subsequent EKG showed NSR with no AVB or prolonged QT. Beta-blocker was held. Electrolytes were reviewed. The patient was asymptomatic afterwards.       Exam:     Vitals:  Vitals:    07/23/25 1643 07/23/25 2000 07/24/25 0008 07/24/25 0730   BP: (!) 164/89 (!) 186/110 (!) 140/81 (!) 152/85   Pulse: 86 88 85 91   Resp: 20 16 16 16   Temp: 98.6 °F (37 °C) 99 °F (37.2 °C) 98.4 °F (36.9 °C) 98.3 °F (36.8 °C)   TempSrc: Oral Oral Oral Oral   SpO2: 98% 95% 95% 95%   Weight:       Height:         Weight: Weight - Scale: 97.9 kg (215 lb 13.3 oz)     24 hour intake/output:  Intake/Output Summary (Last 24 hours) at 7/24/2025 0930  Last data filed at 7/23/2025 1122  Gross per 24 hour   Intake 400 ml   Output 2400 ml   Net -2000 ml         General appearance:  No apparent distress, appears stated age and cooperative.  HEENT:  Normal cephalic, atraumatic without obvious deformity. Pupils equal, round, and reactive to light.  Extra ocular muscles intact. Conjunctivae/corneas clear.  Neck: Supple, with full range of motion. No jugular venous distention. Trachea midline.  Respiratory:  Normal respiratory effort. Clear to auscultation, bilaterally without Rales/Wheezes/Rhonchi.  Cardiovascular:  Regular rate and rhythm with normal S1/S2 without murmurs, rubs or gallops.  Abdomen: Soft, non-tender, non-distended with normal bowel sounds.  Musculoskeletal:  No clubbing, cyanosis or edema bilaterally.  Full range of motion without deformity.  Skin: Skin color, texture, turgor normal.  No rashes or

## 2025-07-24 NOTE — PLAN OF CARE
Problem: Chronic Conditions and Co-morbidities  Goal: Patient's chronic conditions and co-morbidity symptoms are monitored and maintained or improved  Flowsheets (Taken 7/23/2025 2000)  Care Plan - Patient's Chronic Conditions and Co-Morbidity Symptoms are Monitored and Maintained or Improved:   Monitor and assess patient's chronic conditions and comorbid symptoms for stability, deterioration, or improvement   Collaborate with multidisciplinary team to address chronic and comorbid conditions and prevent exacerbation or deterioration   Update acute care plan with appropriate goals if chronic or comorbid symptoms are exacerbated and prevent overall improvement and discharge     Problem: Discharge Planning  Goal: Discharge to home or other facility with appropriate resources  Flowsheets (Taken 7/23/2025 2000)  Discharge to home or other facility with appropriate resources:   Identify barriers to discharge with patient and caregiver   Arrange for needed discharge resources and transportation as appropriate   Identify discharge learning needs (meds, wound care, etc)   Refer to discharge planning if patient needs post-hospital services based on physician order or complex needs related to functional status, cognitive ability or social support system     Problem: Pain  Goal: Verbalizes/displays adequate comfort level or baseline comfort level  Flowsheets (Taken 7/23/2025 2000)  Verbalizes/displays adequate comfort level or baseline comfort level:   Encourage patient to monitor pain and request assistance   Assess pain using appropriate pain scale   Administer analgesics based on type and severity of pain and evaluate response   Implement non-pharmacological measures as appropriate and evaluate response   Consider cultural and social influences on pain and pain management   Notify Licensed Independent Practitioner if interventions unsuccessful or patient reports new pain     Problem: Safety - Adult  Goal: Free from fall

## 2025-07-24 NOTE — FLOWSHEET NOTE
07/24/25 1033 07/24/25 1356   Vital Signs   BP (!) 156/94 (!) 140/86   Temp 97.6 °F (36.4 °C) 98.6 °F (37 °C)   Pulse 89 83   Respirations 14 15   SpO2 94 % 96 %   Weight - Scale 98.4 kg (216 lb 14.9 oz) 97.4 kg (214 lb 11.7 oz)   Weight Method Standing scale Estimated   Percent Weight Change -0.03 -1.02   Post-Hemodialysis Assessment   Post-Treatment Procedures  --  Blood returned;Catheter Capped, clamped with Saline x2 ports   Machine Disinfection Process  --  Acid/Vinegar Clean;Heat Disinfect;Exterior Machine Disinfection   Blood Volume Processed (Liters)  --  51.9 L   Dialyzer Clearance  --  Lightly streaked   Duration of Treatment (minutes)  --  180 minutes   Heparin Amount Administered During Treatment (mL)  --  0 mL   Hemodialysis Intake (ml)  --  400 ml   Hemodialysis Output (ml)  --  1400 ml   NET Removed (ml)  --  1000     3 hour treatment completed. 1L of fluid removed. Attempted additional UF, patient cramping. UF goal decreased. Patient tolerated remaining treatment well. Cath lines flushed with 10 ml of 0.9 NS, clamped and tego secured. Report given to primary RN. Charting printed and placed in bin to be scanned into EMR.

## 2025-07-24 NOTE — DISCHARGE INSTRUCTIONS
Follow-up visits:   [facility]  [address]  [id number]  [place] [id number]  [phone]  Follow up on 7/24/2025  1st session scheduled for Saturday 7/26/2025 at 5:35 AM; please arrive 30 minutes early for 1st session.   Tuesday/Thursday/Saturday schedule with 5:35 AM chair time.

## 2025-07-28 ENCOUNTER — RESULTS FOLLOW-UP (OUTPATIENT)
Dept: NEPHROLOGY | Age: 40
End: 2025-07-28

## 2025-07-28 LAB — MISC REFERENCE: NORMAL

## 2025-07-28 NOTE — RESULT ENCOUNTER NOTE
Can you fax this biopsy report to [facility] dialysis unit? Pls call them and ask them to send this report to his nephrologist at the dialysis unit.

## 2025-08-04 ENCOUNTER — OFFICE VISIT (OUTPATIENT)
Dept: FAMILY MEDICINE CLINIC | Age: 40
End: 2025-08-04
Payer: COMMERCIAL

## 2025-08-04 VITALS
HEART RATE: 88 BPM | SYSTOLIC BLOOD PRESSURE: 136 MMHG | TEMPERATURE: 98.2 F | DIASTOLIC BLOOD PRESSURE: 80 MMHG | BODY MASS INDEX: 33.12 KG/M2 | WEIGHT: 223.6 LBS | RESPIRATION RATE: 18 BRPM | OXYGEN SATURATION: 97 % | HEIGHT: 69 IN

## 2025-08-04 DIAGNOSIS — R11.0 NAUSEA: ICD-10-CM

## 2025-08-04 DIAGNOSIS — I10 ESSENTIAL HYPERTENSION: ICD-10-CM

## 2025-08-04 DIAGNOSIS — E11.21: ICD-10-CM

## 2025-08-04 DIAGNOSIS — E10.21 DIABETIC NEPHROPATHY ASSOCIATED WITH TYPE 1 DIABETES MELLITUS (HCC): Primary | ICD-10-CM

## 2025-08-04 PROCEDURE — 99214 OFFICE O/P EST MOD 30 MIN: CPT | Performed by: FAMILY MEDICINE

## 2025-08-04 PROCEDURE — G8417 CALC BMI ABV UP PARAM F/U: HCPCS | Performed by: FAMILY MEDICINE

## 2025-08-04 PROCEDURE — 3079F DIAST BP 80-89 MM HG: CPT | Performed by: FAMILY MEDICINE

## 2025-08-04 PROCEDURE — 2022F DILAT RTA XM EVC RTNOPTHY: CPT | Performed by: FAMILY MEDICINE

## 2025-08-04 PROCEDURE — 3044F HG A1C LEVEL LT 7.0%: CPT | Performed by: FAMILY MEDICINE

## 2025-08-04 PROCEDURE — 3075F SYST BP GE 130 - 139MM HG: CPT | Performed by: FAMILY MEDICINE

## 2025-08-04 PROCEDURE — 1111F DSCHRG MED/CURRENT MED MERGE: CPT | Performed by: FAMILY MEDICINE

## 2025-08-04 PROCEDURE — G2211 COMPLEX E/M VISIT ADD ON: HCPCS | Performed by: FAMILY MEDICINE

## 2025-08-04 PROCEDURE — 4004F PT TOBACCO SCREEN RCVD TLK: CPT | Performed by: FAMILY MEDICINE

## 2025-08-04 PROCEDURE — G8427 DOCREV CUR MEDS BY ELIG CLIN: HCPCS | Performed by: FAMILY MEDICINE

## 2025-08-04 RX ORDER — ONDANSETRON 4 MG/1
4 TABLET, ORALLY DISINTEGRATING ORAL EVERY 8 HOURS PRN
Qty: 30 TABLET | Refills: 3 | Status: SHIPPED | OUTPATIENT
Start: 2025-08-04

## 2025-08-05 DIAGNOSIS — R13.10 DYSPHAGIA, UNSPECIFIED TYPE: ICD-10-CM

## 2025-08-07 RX ORDER — OMEPRAZOLE 40 MG/1
40 CAPSULE, DELAYED RELEASE ORAL DAILY
Qty: 90 CAPSULE | Refills: 1 | Status: SHIPPED | OUTPATIENT
Start: 2025-08-07

## 2025-08-11 ASSESSMENT — ENCOUNTER SYMPTOMS
ABDOMINAL PAIN: 0
NAUSEA: 1

## 2025-08-13 LAB — ECHO BSA: 2.28 M2

## 2025-08-14 PROBLEM — R79.89 ELEVATED TROPONIN: Status: RESOLVED | Noted: 2025-07-15 | Resolved: 2025-08-14

## 2025-09-03 DIAGNOSIS — E10.42 TYPE 1 DIABETES MELLITUS WITH DIABETIC POLYNEUROPATHY (HCC): ICD-10-CM

## 2025-09-03 RX ORDER — ACYCLOVIR 400 MG/1
TABLET ORAL
Qty: 3 EACH | Refills: 5 | Status: SHIPPED | OUTPATIENT
Start: 2025-09-03